# Patient Record
Sex: MALE | Race: WHITE | Employment: FULL TIME | URBAN - METROPOLITAN AREA
[De-identification: names, ages, dates, MRNs, and addresses within clinical notes are randomized per-mention and may not be internally consistent; named-entity substitution may affect disease eponyms.]

---

## 2017-01-19 PROBLEM — I10 ESSENTIAL HYPERTENSION WITH GOAL BLOOD PRESSURE LESS THAN 130/85: Status: ACTIVE | Noted: 2017-01-19

## 2017-02-10 ENCOUNTER — HOSPITAL ENCOUNTER (OUTPATIENT)
Dept: LAB | Age: 52
Discharge: HOME | End: 2017-02-10
Attending: RADIOLOGY
Payer: COMMERCIAL

## 2017-02-10 LAB
ALBUMIN SERPL BCP-MCNC: 3.7 G/DL (ref 3.5–5)
ALBUMIN/GLOB SERPL: 1.1 {RATIO}
ALP SERPL-CCNC: 43 U/L (ref 50–136)
ALT SERPL-CCNC: 34 U/L (ref 12–65)
ANION GAP BLD CALC-SCNC: 9 MMOL/L
APTT PPP: 23.8 SEC (ref 23.5–31.7)
AST SERPL W P-5'-P-CCNC: 13 U/L (ref 15–37)
BILIRUB SERPL-MCNC: 0.3 MG/DL (ref 0.2–1.1)
BUN SERPL-MCNC: 17 MG/DL (ref 6–23)
CALCIUM SERPL-MCNC: 8.5 MG/DL (ref 8.3–10.4)
CHLORIDE SERPL-SCNC: 106 MMOL/L (ref 98–107)
CO2 SERPL-SCNC: 28 MMOL/L (ref 23–32)
CREAT SERPL-MCNC: 0.8 MG/DL (ref 0.6–1)
ERYTHROCYTE [DISTWIDTH] IN BLOOD BY AUTOMATED COUNT: 12.6 % (ref 11.9–14.6)
GLOBULIN SER CALC-MCNC: 3.3 G/DL
GLUCOSE SERPL-MCNC: 103 MG/DL (ref 65–100)
HCT VFR BLD AUTO: 41.7 % (ref 41.1–50.3)
HGB BLD-MCNC: 14.2 G/DL (ref 13.6–17.2)
INR PPP: 0.9 (ref 0.9–1.2)
MCH RBC QN AUTO: 32.3 PG (ref 26.1–32.9)
MCHC RBC AUTO-ENTMCNC: 34.1 G/DL (ref 31.4–35)
MCV RBC AUTO: 95 FL (ref 79.6–97.8)
PLATELET # BLD AUTO: 212 K/UL (ref 150–450)
PMV BLD AUTO: 9.3 FL (ref 10.8–14.1)
POTASSIUM SERPL-SCNC: 3.7 MMOL/L (ref 3.5–5.1)
PROT SERPL-MCNC: 7 G/DL (ref 6.3–8.2)
PROTHROMBIN TIME: 9.6 SEC (ref 9.6–12)
RBC # BLD AUTO: 4.39 M/UL (ref 4.23–5.67)
SODIUM SERPL-SCNC: 143 MMOL/L (ref 136–145)
WBC # BLD AUTO: 5.2 K/UL (ref 4.3–11.1)

## 2017-02-10 PROCEDURE — 85027 COMPLETE CBC AUTOMATED: CPT | Performed by: RADIOLOGY

## 2017-02-10 PROCEDURE — 80053 COMPREHEN METABOLIC PANEL: CPT | Performed by: RADIOLOGY

## 2017-02-10 PROCEDURE — 85730 THROMBOPLASTIN TIME PARTIAL: CPT | Performed by: RADIOLOGY

## 2017-02-10 PROCEDURE — 36415 COLL VENOUS BLD VENIPUNCTURE: CPT | Performed by: RADIOLOGY

## 2017-02-10 PROCEDURE — 85610 PROTHROMBIN TIME: CPT | Performed by: RADIOLOGY

## 2017-02-17 ENCOUNTER — HOSPITAL ENCOUNTER (OUTPATIENT)
Dept: INTERVENTIONAL RADIOLOGY/VASCULAR | Age: 52
Discharge: HOME | End: 2017-02-17
Attending: RADIOLOGY
Payer: COMMERCIAL

## 2017-02-17 VITALS
SYSTOLIC BLOOD PRESSURE: 112 MMHG | HEART RATE: 77 BPM | HEIGHT: 70 IN | RESPIRATION RATE: 14 BRPM | DIASTOLIC BLOOD PRESSURE: 61 MMHG | WEIGHT: 190 LBS | BODY MASS INDEX: 27.2 KG/M2 | OXYGEN SATURATION: 97 %

## 2017-02-17 DIAGNOSIS — N28.1 RENAL CYST: ICD-10-CM

## 2017-02-17 PROCEDURE — 88305 TISSUE EXAM BY PATHOLOGIST: CPT | Performed by: RADIOLOGY

## 2017-02-17 PROCEDURE — 77030003666 HC NDL SPINAL BD -A

## 2017-02-17 PROCEDURE — 99153 MOD SED SAME PHYS/QHP EA: CPT

## 2017-02-17 PROCEDURE — 74011250636 HC RX REV CODE- 250/636: Performed by: RADIOLOGY

## 2017-02-17 PROCEDURE — 74011636320 HC RX REV CODE- 636/320: Performed by: RADIOLOGY

## 2017-02-17 PROCEDURE — 76942 ECHO GUIDE FOR BIOPSY: CPT

## 2017-02-17 PROCEDURE — 74011250636 HC RX REV CODE- 250/636

## 2017-02-17 PROCEDURE — 50390 DRAINAGE OF KIDNEY LESION: CPT

## 2017-02-17 PROCEDURE — C1729 CATH, DRAINAGE: HCPCS

## 2017-02-17 PROCEDURE — 99152 MOD SED SAME PHYS/QHP 5/>YRS: CPT

## 2017-02-17 PROCEDURE — C1769 GUIDE WIRE: HCPCS

## 2017-02-17 PROCEDURE — 74011000250 HC RX REV CODE- 250: Performed by: RADIOLOGY

## 2017-02-17 PROCEDURE — 88112 CYTOPATH CELL ENHANCE TECH: CPT | Performed by: RADIOLOGY

## 2017-02-17 RX ORDER — SODIUM CHLORIDE 9 MG/ML
500 INJECTION, SOLUTION INTRAVENOUS CONTINUOUS
Status: DISCONTINUED | OUTPATIENT
Start: 2017-02-17 | End: 2017-02-21 | Stop reason: HOSPADM

## 2017-02-17 RX ORDER — HYDROCODONE BITARTRATE AND ACETAMINOPHEN 7.5; 325 MG/1; MG/1
1 TABLET ORAL
Status: DISCONTINUED | OUTPATIENT
Start: 2017-02-17 | End: 2017-02-21 | Stop reason: HOSPADM

## 2017-02-17 RX ORDER — LIDOCAINE HYDROCHLORIDE 20 MG/ML
100-200 INJECTION, SOLUTION INFILTRATION; PERINEURAL ONCE
Status: COMPLETED | OUTPATIENT
Start: 2017-02-17 | End: 2017-02-17

## 2017-02-17 RX ORDER — DIPHENHYDRAMINE HYDROCHLORIDE 50 MG/ML
12.5-5 INJECTION, SOLUTION INTRAMUSCULAR; INTRAVENOUS ONCE
Status: COMPLETED | OUTPATIENT
Start: 2017-02-17 | End: 2017-02-17

## 2017-02-17 RX ORDER — MIDAZOLAM HYDROCHLORIDE 1 MG/ML
.25-2 INJECTION, SOLUTION INTRAMUSCULAR; INTRAVENOUS
Status: DISCONTINUED | OUTPATIENT
Start: 2017-02-17 | End: 2017-02-17 | Stop reason: ALTCHOICE

## 2017-02-17 RX ORDER — FENTANYL CITRATE 50 UG/ML
25-100 INJECTION, SOLUTION INTRAMUSCULAR; INTRAVENOUS
Status: DISCONTINUED | OUTPATIENT
Start: 2017-02-17 | End: 2017-02-17 | Stop reason: ALTCHOICE

## 2017-02-17 RX ORDER — HEPARIN SODIUM 200 [USP'U]/100ML
1000 INJECTION, SOLUTION INTRAVENOUS
Status: DISCONTINUED | OUTPATIENT
Start: 2017-02-17 | End: 2017-02-17 | Stop reason: ALTCHOICE

## 2017-02-17 RX ORDER — SODIUM CHLORIDE 9 MG/ML
25 INJECTION, SOLUTION INTRAVENOUS ONCE
Status: COMPLETED | OUTPATIENT
Start: 2017-02-17 | End: 2017-02-17

## 2017-02-17 RX ORDER — CIPROFLOXACIN 2 MG/ML
400 INJECTION, SOLUTION INTRAVENOUS ONCE
Status: COMPLETED | OUTPATIENT
Start: 2017-02-17 | End: 2017-02-17

## 2017-02-17 RX ORDER — ATROPINE SULFATE 0.1 MG/ML
0.5 INJECTION INTRAVENOUS ONCE
Status: COMPLETED | OUTPATIENT
Start: 2017-02-17 | End: 2017-02-17

## 2017-02-17 RX ADMIN — DIPHENHYDRAMINE HYDROCHLORIDE 50 MG: 50 INJECTION, SOLUTION INTRAMUSCULAR; INTRAVENOUS at 10:17

## 2017-02-17 RX ADMIN — LIDOCAINE HYDROCHLORIDE 100 MG: 20 INJECTION, SOLUTION INFILTRATION; PERINEURAL at 11:15

## 2017-02-17 RX ADMIN — SODIUM CHLORIDE 25 ML/HR: 900 INJECTION, SOLUTION INTRAVENOUS at 09:45

## 2017-02-17 RX ADMIN — CIPROFLOXACIN 400 MG: 2 INJECTION, SOLUTION INTRAVENOUS at 10:05

## 2017-02-17 RX ADMIN — MIDAZOLAM HYDROCHLORIDE 1 MG: 1 INJECTION, SOLUTION INTRAMUSCULAR; INTRAVENOUS at 10:22

## 2017-02-17 RX ADMIN — MIDAZOLAM HYDROCHLORIDE 1 MG: 1 INJECTION, SOLUTION INTRAMUSCULAR; INTRAVENOUS at 11:05

## 2017-02-17 RX ADMIN — SODIUM BICARBONATE 2 ML: 0.2 INJECTION, SOLUTION INTRAVENOUS at 11:15

## 2017-02-17 RX ADMIN — SODIUM CHLORIDE 250 ML: 900 INJECTION, SOLUTION INTRAVENOUS at 11:32

## 2017-02-17 RX ADMIN — IOPAMIDOL 8 ML: 612 INJECTION, SOLUTION INTRAVENOUS at 11:48

## 2017-02-17 RX ADMIN — ALCOHOL 8 ML: 0.98 INJECTION INTRASPINAL at 11:36

## 2017-02-17 RX ADMIN — MIDAZOLAM HYDROCHLORIDE 1 MG: 1 INJECTION, SOLUTION INTRAMUSCULAR; INTRAVENOUS at 10:02

## 2017-02-17 RX ADMIN — SODIUM CHLORIDE 500 ML: 900 INJECTION, SOLUTION INTRAVENOUS at 11:18

## 2017-02-17 RX ADMIN — ATROPINE SULFATE 0.5 MG: 0.1 INJECTION, SOLUTION INTRAVENOUS at 11:16

## 2017-02-17 RX ADMIN — FENTANYL CITRATE 50 MCG: 50 INJECTION, SOLUTION INTRAMUSCULAR; INTRAVENOUS at 10:02

## 2017-02-17 NOTE — PROGRESS NOTES
TRANSFER - OUT REPORT:    Verbal report given to JERALD RN (name) on Roro Koroma  being transferred to IR Recovery (unit) for routine progression of care       Report consisted of patients Situation, Background, Assessment and   Recommendations(SBAR). Information from the following report(s) Procedure Summary and MAR was reviewed with the receiving nurse. Opportunity for questions and clarification was provided. Conscious Sedation:   50 Mcg of Fentanyl administered  3 Mg of Versed administered  50 mg Benadryl    Pt tolerated procedure fair. Visit Vitals    /82    Pulse 65    Resp 9    Ht 5' 10\" (1.778 m)    Wt 86.2 kg (190 lb)    SpO2 100%    BMI 27.26 kg/m2     Past Medical History   Diagnosis Date    [facility]ism [facility]al[facility] ([facility] [address]     Cardiomyopathy ([facility] [address] 6/30/2016    Diverticular disease      colon resection with no symptoms since surgery    Essential hypertension      managed with med.  GERD (gastroesophageal reflux disease)      takes medication but not altogether relieved    Hypertension      managed with med.       Peripheral IV 02/17/17 Left Antecubital (Active)       Peripheral IV 02/17/17 Left Forearm (Active)

## 2017-02-17 NOTE — PROCEDURES
Interventional Radiology Brief Procedure Note    Patient: Fortunato Dakins MRN: [id number]  SSN: [id number]    YOB: 1965  Age: 46 y.o. Sex: male      Date of Procedure: 2/17/2017    Pre-Procedure Diagnosis:rt renal cyst    Post-Procedure Diagnosis: SAME    Procedure(s): US and fluoroscopic guided cyst aspiration, contrast study and sclerosis    Brief Description of Procedure: pt supine, aspirated 65ml fluid, sent for cytology. Contrast, then 8 ml abs ethanol    Performed By: Kelly Álvarez MD     Assistants: None    Anesthesia: Moderate Sedation    Estimated Blood Loss: Less than 10ml    Specimens: cyst fluid for cytology    Implants: ethanol 8 ml, re aspirated approx 4 ml. Findings: cyst, had to go through Rt lobe of liver    Complications: transient bradycardia felt secondary to vasovagal reaction.   Mild rash probably from fentanyl allergy, responded to Benadryl    Recommendations: follow symptomatically, will check cytology     Follow Up: family physician    Signed By: Kelly Álvarez MD     February 17, 2017

## 2017-02-17 NOTE — PROGRESS NOTES
Interventional Radiology Prep Area Handoff      Allergies   Allergen Reactions    Pcn [Penicillins] Hives       IRSummary reviewed. Pt identified with two identifiers. Verified procedure with Patient and IR Provider as Renal Cyst Ablation. Consent obtained: Awaiting MD H&P complete/updated: Awaiting MD MD airway complete: Awaiting MD    Sedation:Yes   NPO: Yes   Anticoagulation: None     Pt shaved: Yes   Antibiotics: Awaiting MD   Anesthesia notified: N/A    Additional Notes: None      Lines:  Peripherally Inserted Central Catheter:     Central Venous Catheter:     Venous Access Device:     Peripheral Intravenous Line:     Hemodialysis Catheter:     Drain(s):       Labs verified: Yes  No results found for this or any previous visit (from the past 24 hour(s)).   Patient Vitals for the past 8 hrs:   Pulse Resp BP SpO2   02/17/17 0833 61 17 113/70 99 %

## 2017-02-17 NOTE — PROGRESS NOTES
Patient placed prone on procedure table. Pressure points padded and safety straps applied.  in place.

## 2017-02-17 NOTE — PROGRESS NOTES
Discharge complete. Discharge instructions reviewed with pt. Time for questions allowed. Pt denies any questions at this time. Dressing to right back noted to be clean, dry, and intact. Pt assisted to front of hospital via wheelchair.      Visit Vitals    /61    Pulse 85    Resp 12    Ht 5' 10\" (1.778 m)    Wt 86.2 kg (190 lb)    SpO2 97%    BMI 27.26 kg/m2

## 2017-02-17 NOTE — IP AVS SNAPSHOT
[address] 
[phone] Patient: Nickolas Cárdenas MRN: [id number] :1965 You are allergic to the following Allergen Reactions Pcn (Penicillins) Hives Recent Documentation Height Weight BMI Smoking Status 1.778 m 86.2 kg 27.26 kg/m2 Former Smoker Emergency Contacts Name Discharge Info Relation Home Work Mobile Rae Alvarado DISCHARGE CAREGIVER [3] Mother [14] [phone] [address] [place] [address] CAREGIVER [3] Girlfriend [18] [phone] About your hospitalization You were admitted on:  2017 [facility] You were discharged on:  2017 Unit phone number:  [phone] Why you were hospitalized Your primary diagnosis was:  Not on File Providers Seen During Your Hospitalizations Provider Role Specialty Primary office phone Rocío Cunha MD Attending Provider Radiology [phone] Your Primary Care Physician (PCP) Primary Care Physician Office Phone Office Fax Cristal Finder [phone] Follow-up Information None Current Discharge Medication List  
  
ASK your doctor about these medications Dose & Instructions Dispensing Information Comments Morning Noon Evening Bedtime  
 cyanocobalamin 1,000 mcg tablet Your next dose is: Today, Tomorrow Other:  _________ Dose:  1000 mcg Take 1,000 mcg by mouth daily. Refills:  0  
     
   
   
   
  
 FISH OIL 1,000 mg Cap Generic drug:  omega-3 fatty acids-vitamin e Your next dose is: Today, Tomorrow Other:  _________ Dose:  1 Cap Take 1 Cap by mouth. Refills:  0 HYDROcodone-acetaminophen 7.5-325 mg per tablet Commonly known as:  Kaylie Bolden  
   
 Your next dose is: Today, Tomorrow Other:  _________ Dose:  1 Tab Take 1 Tab by mouth every six (6) hours as needed for Pain. Max Daily Amount: 4 Tabs. Quantity:  20 Tab Refills:  0  
     
   
   
   
  
 lisinopril 10 mg tablet Commonly known as:  Maria Luisa Joan Your next dose is: Today, Tomorrow Other:  _________ Dose:  10 mg Take 1 Tab by mouth daily. Quantity:  90 Tab Refills:  3  
     
   
   
   
  
 metoprolol succinate 25 mg XL tablet Commonly known as:  TOPROL-XL Your next dose is: Today, Tomorrow Other:  _________ Dose:  25 mg Take 1 Tab by mouth daily. Quantity:  90 Tab Refills:  3  
     
   
   
   
  
 multivitamin tablet Commonly known as:  ONE A DAY Your next dose is: Today, Tomorrow Other:  _________ Dose:  1 Tab Take 1 Tab by mouth every morning. Refills:  0 PROBIOTIC 4X 10-15 mg Tbec Generic drug:  B.infantis-B.ani-B.long-B.bifi Your next dose is: Today, Tomorrow Other:  _________ Take  by mouth. Refills:  0  
     
   
   
   
  
 sertraline 100 mg tablet Commonly known as:  ZOLOFT Your next dose is: Today, Tomorrow Other:  _________ Dose:  100 mg Take 1 Tab by mouth daily. Quantity:  90 Tab Refills:  1 VITAMIN C 1,000 mg tablet Generic drug:  ascorbic acid (vitamin C) Your next dose is: Today, Tomorrow Other:  _________ Dose:  1000 mg Take 1,000 mg by mouth every morning. Refills:  0 Discharge Instructions [place] [address] Department of Interventional Radiology [facility] 
[phone] Office 
[phone] Fax GENERAL SEDATION  DISCHARGE INSTRUCTIONS General Information: You received Sedation for your procedure today. Home Care Instructions Pain Control: For the majority of patients, one to two days of oral narcotic pain medication may be necessary after which Extra Strength Tylenol is usually sufficient to manage their pain. Showering: Patients can shower immediately upon discharge from the hospital allowing their incisions to get wet. Once out of the shower, pad your incision sites dry and avoid any heavy creams or lotions. Tub baths or hot tubs in the first 2 weeks are discouraged as this will allow for prolonged soaking of your incisions and increase the risk of infection. You may shower after returning home from the hospital. Your wound sites can get wet, but must be patted dry immediately after showering. Activity: Limit activity for 24 hours. After 24 hours resume normal activity slowly. Diet: Patients may resume a regular diet as tolerated You can resume your regular diet and medication regimen. You should relax for the rest of the day, and you will need someone to drive you home. For the next 24 hours, you should not drink alcohol, drive or take any sedative medications. Call If: 
You should call your Physician and/or the Radiology Nurse if you have any allergic reaction, which includes swelling, itching, rash, welts, and/or shortness of breath. Also, if you notice any sings of inceftion, redness, fever, puss at the procedure site. Follow-Up Instructions: Please see your ordering doctor as he/she has requested. To Reach Us: If you have any questions about your procedure, please call the Interventional Radiology department at [phone]. After business hours (5pm) and weekends, call the answering service at [phone] and ask for the Radiologist on call to be paged. Patient Signature: 
Date: 2/17/2017 Discharging Nurse: Calli Trejo RN Discharge Orders None Introducing [facility]! Dear Tierney Ramos: Thank you for requesting a [employer] account. Our records indicate that you already have an active Lexara account. You can access your account anytime at [email or web address] Did you know that you can access your hospital and ER discharge instructions at any time in Lexara? You can also review all of your test results from your hospital stay or ER visit. Additional Information If you have questions, please visit the Frequently Asked Questions section of the Lexara website at [email or web address] Remember, Lexara is NOT to be used for urgent needs. For medical emergencies, dial 911. Now available from your iPhone and Android! General Information Please provide this summary of care documentation to your next provider. Patient Signature:  ____________________________________________________________ Date:  ____________________________________________________________  
  
Earnstine Joao Provider Signature:  ____________________________________________________________ Date:  ____________________________________________________________

## 2017-02-17 NOTE — DISCHARGE INSTRUCTIONS
[address]  Department of Interventional Radiology  [facility]  [phone] Office  [phone] Fax    GENERAL SEDATION  DISCHARGE INSTRUCTIONS    General Information: You received Sedation for your procedure today. Home Care Instructions    Pain Control: For the majority of patients, one to two days of oral narcotic pain medication may be necessary after which Extra Strength Tylenol is usually sufficient to manage their pain. Showering: Patients can shower immediately upon discharge from the hospital allowing their incisions to get wet. Once out of the shower, pad your incision sites dry and avoid any heavy creams or lotions. Tub baths or hot tubs in the first 2 weeks are discouraged as this will allow for prolonged soaking of your incisions and increase the risk of infection. You may shower after returning home from the hospital. Your wound sites can get wet, but must be patted dry immediately after showering. Activity: Limit activity for 24 hours. After 24 hours resume normal activity slowly. Diet: Patients may resume a regular diet as tolerated You can resume your regular diet and medication regimen. You should relax for the rest of the day, and you will need someone to drive you home. For the next 24 hours, you should not drink alcohol, drive or take any sedative medications. Call If:  You should call your Physician and/or the Radiology Nurse if you have any allergic reaction, which includes swelling, itching, rash, welts, and/or shortness of breath. Also, if you notice any sings of inceftion, redness, fever, puss at the procedure site. Follow-Up Instructions: Please see your ordering doctor as he/she has requested. To Reach Us: If you have any questions about your procedure, please call the Interventional Radiology department at [phone].  After business hours (5pm) and weekends, call the answering service at [phone] and ask for the Radiologist on call to be paged.               Patient Signature:  Date: 2/17/2017  Discharging Nurse: Dandy Bernard RN

## 2017-02-17 NOTE — PROGRESS NOTES
TRANSFER - IN REPORT:    Verbal report received from LAUREN Joy(name) on Rip van Wafels  being received from procedure room(unit) for routine progression of care      Report consisted of patients Situation, Background, Assessment and   Recommendations(SBAR). Information from the following report(s) SBAR was reviewed with the receiving nurse. Opportunity for questions and clarification was provided. Assessment completed upon patients arrival to unit and care assumed.

## 2017-12-20 PROBLEM — F33.9 RECURRENT DEPRESSION (HCC): Status: ACTIVE | Noted: 2017-12-20

## 2018-12-19 PROBLEM — K21.9 GASTROESOPHAGEAL REFLUX DISEASE: Status: ACTIVE | Noted: 2018-12-19

## 2018-12-19 PROBLEM — I49.3 PVCS (PREMATURE VENTRICULAR CONTRACTIONS): Status: ACTIVE | Noted: 2018-12-19

## 2018-12-20 ENCOUNTER — HOSPITAL ENCOUNTER (OUTPATIENT)
Dept: LAB | Age: 53
Discharge: HOME | End: 2018-12-20
Payer: COMMERCIAL

## 2018-12-20 DIAGNOSIS — I49.3 PVCS (PREMATURE VENTRICULAR CONTRACTIONS): ICD-10-CM

## 2018-12-20 LAB
MAGNESIUM SERPL-MCNC: 2.2 MG/DL (ref 1.8–2.4)
POTASSIUM SERPL-SCNC: 3.7 MMOL/L (ref 3.5–5.1)

## 2018-12-20 PROCEDURE — 84132 ASSAY OF SERUM POTASSIUM: CPT

## 2018-12-20 PROCEDURE — 36415 COLL VENOUS BLD VENIPUNCTURE: CPT

## 2018-12-20 PROCEDURE — 83735 ASSAY OF MAGNESIUM: CPT

## 2020-02-14 PROCEDURE — 96361 HYDRATE IV INFUSION ADD-ON: CPT

## 2020-02-14 PROCEDURE — 99285 EMERGENCY DEPT VISIT HI MDM: CPT

## 2020-02-14 PROCEDURE — 30233N1 TRANSFUSION OF NONAUTOLOGOUS RED BLOOD CELLS INTO PERIPHERAL VEIN, PERCUTANEOUS APPROACH: ICD-10-PCS | Performed by: INTERNAL MEDICINE

## 2020-02-14 PROCEDURE — 96360 HYDRATION IV INFUSION INIT: CPT

## 2020-02-15 ENCOUNTER — HOSPITAL ENCOUNTER (INPATIENT)
Age: 55
LOS: 3 days | Discharge: HOME | DRG: 378 | End: 2020-02-18
Attending: EMERGENCY MEDICINE | Admitting: INTERNAL MEDICINE
Payer: COMMERCIAL

## 2020-02-15 ENCOUNTER — APPOINTMENT (OUTPATIENT)
Dept: CT IMAGING | Age: 55
DRG: 378 | End: 2020-02-15
Attending: EMERGENCY MEDICINE
Payer: COMMERCIAL

## 2020-02-15 DIAGNOSIS — K62.5 RECTAL BLEEDING: Primary | ICD-10-CM

## 2020-02-15 PROBLEM — K92.2 GI BLEED: Status: ACTIVE | Noted: 2020-02-15

## 2020-02-15 PROBLEM — K57.90 DIVERTICULOSIS: Chronic | Status: ACTIVE | Noted: 2020-02-15

## 2020-02-15 LAB
ALBUMIN SERPL-MCNC: 3.4 G/DL (ref 3.5–5)
ALBUMIN/GLOB SERPL: 1.2 {RATIO} (ref 1.2–3.5)
ALP SERPL-CCNC: 37 U/L (ref 50–136)
ALT SERPL-CCNC: 21 U/L (ref 12–65)
ANION GAP SERPL CALC-SCNC: 5 MMOL/L (ref 7–16)
APTT PPP: 25 SEC (ref 24.3–35.4)
AST SERPL-CCNC: 11 U/L (ref 15–37)
BASOPHILS # BLD: 0 K/UL (ref 0–0.2)
BASOPHILS NFR BLD: 0 % (ref 0–2)
BILIRUB SERPL-MCNC: 0.2 MG/DL (ref 0.2–1.1)
BUN SERPL-MCNC: 30 MG/DL (ref 6–23)
CALCIUM SERPL-MCNC: 8.3 MG/DL (ref 8.3–10.4)
CHLORIDE SERPL-SCNC: 110 MMOL/L (ref 98–107)
CO2 SERPL-SCNC: 27 MMOL/L (ref 21–32)
CREAT SERPL-MCNC: 0.84 MG/DL (ref 0.8–1.5)
DIFFERENTIAL METHOD BLD: ABNORMAL
EOSINOPHIL # BLD: 0.2 K/UL (ref 0–0.8)
EOSINOPHIL NFR BLD: 3 % (ref 0.5–7.8)
ERYTHROCYTE [DISTWIDTH] IN BLOOD BY AUTOMATED COUNT: 12.4 % (ref 11.9–14.6)
ERYTHROCYTE [DISTWIDTH] IN BLOOD BY AUTOMATED COUNT: 12.4 % (ref 11.9–14.6)
GLOBULIN SER CALC-MCNC: 2.9 G/DL (ref 2.3–3.5)
GLUCOSE SERPL-MCNC: 120 MG/DL (ref 65–100)
HCT VFR BLD AUTO: 33.4 % (ref 41.1–50.3)
HCT VFR BLD AUTO: 38.8 % (ref 41.1–50.3)
HGB BLD-MCNC: 10.8 G/DL (ref 13.6–17.2)
HGB BLD-MCNC: 12.6 G/DL (ref 13.6–17.2)
HGB BLD-MCNC: 8.8 G/DL (ref 13.6–17.2)
HGB BLD-MCNC: 9.8 G/DL (ref 13.6–17.2)
IMM GRANULOCYTES # BLD AUTO: 0 K/UL (ref 0–0.5)
IMM GRANULOCYTES NFR BLD AUTO: 0 % (ref 0–5)
INR PPP: 1
LYMPHOCYTES # BLD: 2.8 K/UL (ref 0.5–4.6)
LYMPHOCYTES NFR BLD: 35 % (ref 13–44)
MCH RBC QN AUTO: 32 PG (ref 26.1–32.9)
MCH RBC QN AUTO: 32 PG (ref 26.1–32.9)
MCHC RBC AUTO-ENTMCNC: 32.3 G/DL (ref 31.4–35)
MCHC RBC AUTO-ENTMCNC: 32.5 G/DL (ref 31.4–35)
MCV RBC AUTO: 98.5 FL (ref 79.6–97.8)
MCV RBC AUTO: 99.1 FL (ref 79.6–97.8)
MONOCYTES # BLD: 0.5 K/UL (ref 0.1–1.3)
MONOCYTES NFR BLD: 6 % (ref 4–12)
NEUTS SEG # BLD: 4.4 K/UL (ref 1.7–8.2)
NEUTS SEG NFR BLD: 55 % (ref 43–78)
NRBC # BLD: 0 K/UL (ref 0–0.2)
NRBC # BLD: 0 K/UL (ref 0–0.2)
PLATELET # BLD AUTO: 179 K/UL (ref 150–450)
PLATELET # BLD AUTO: 216 K/UL (ref 150–450)
PMV BLD AUTO: 9.2 FL (ref 9.4–12.3)
PMV BLD AUTO: 9.3 FL (ref 9.4–12.3)
POTASSIUM SERPL-SCNC: 4 MMOL/L (ref 3.5–5.1)
PROT SERPL-MCNC: 6.3 G/DL (ref 6.3–8.2)
PROTHROMBIN TIME: 13 SEC (ref 12–14.7)
RBC # BLD AUTO: 3.37 M/UL (ref 4.23–5.6)
RBC # BLD AUTO: 3.94 M/UL (ref 4.23–5.6)
SODIUM SERPL-SCNC: 142 MMOL/L (ref 136–145)
WBC # BLD AUTO: 10.1 K/UL (ref 4.3–11.1)
WBC # BLD AUTO: 7.9 K/UL (ref 4.3–11.1)

## 2020-02-15 PROCEDURE — 96360 HYDRATION IV INFUSION INIT: CPT

## 2020-02-15 PROCEDURE — 74011000258 HC RX REV CODE- 258: Performed by: EMERGENCY MEDICINE

## 2020-02-15 PROCEDURE — 86923 COMPATIBILITY TEST ELECTRIC: CPT

## 2020-02-15 PROCEDURE — 85027 COMPLETE CBC AUTOMATED: CPT

## 2020-02-15 PROCEDURE — 86900 BLOOD TYPING SEROLOGIC ABO: CPT

## 2020-02-15 PROCEDURE — 65270000029 HC RM PRIVATE

## 2020-02-15 PROCEDURE — 74177 CT ABD & PELVIS W/CONTRAST: CPT

## 2020-02-15 PROCEDURE — 85610 PROTHROMBIN TIME: CPT

## 2020-02-15 PROCEDURE — 36415 COLL VENOUS BLD VENIPUNCTURE: CPT

## 2020-02-15 PROCEDURE — 96361 HYDRATE IV INFUSION ADD-ON: CPT

## 2020-02-15 PROCEDURE — 85018 HEMOGLOBIN: CPT

## 2020-02-15 PROCEDURE — 74011250637 HC RX REV CODE- 250/637: Performed by: INTERNAL MEDICINE

## 2020-02-15 PROCEDURE — 85025 COMPLETE CBC W/AUTO DIFF WBC: CPT

## 2020-02-15 PROCEDURE — 74011636320 HC RX REV CODE- 636/320: Performed by: EMERGENCY MEDICINE

## 2020-02-15 PROCEDURE — 85730 THROMBOPLASTIN TIME PARTIAL: CPT

## 2020-02-15 PROCEDURE — 74011250636 HC RX REV CODE- 250/636: Performed by: INTERNAL MEDICINE

## 2020-02-15 PROCEDURE — 80053 COMPREHEN METABOLIC PANEL: CPT

## 2020-02-15 PROCEDURE — 74011250636 HC RX REV CODE- 250/636: Performed by: EMERGENCY MEDICINE

## 2020-02-15 RX ORDER — SODIUM CHLORIDE 0.9 % (FLUSH) 0.9 %
5-40 SYRINGE (ML) INJECTION EVERY 8 HOURS
Status: DISCONTINUED | OUTPATIENT
Start: 2020-02-15 | End: 2020-02-18 | Stop reason: HOSPADM

## 2020-02-15 RX ORDER — SODIUM CHLORIDE 0.9 % (FLUSH) 0.9 %
10 SYRINGE (ML) INJECTION
Status: COMPLETED | OUTPATIENT
Start: 2020-02-15 | End: 2020-02-15

## 2020-02-15 RX ORDER — SODIUM CHLORIDE 9 MG/ML
100 INJECTION, SOLUTION INTRAVENOUS CONTINUOUS
Status: DISCONTINUED | OUTPATIENT
Start: 2020-02-15 | End: 2020-02-16

## 2020-02-15 RX ORDER — SODIUM CHLORIDE 0.9 % (FLUSH) 0.9 %
5-40 SYRINGE (ML) INJECTION AS NEEDED
Status: DISCONTINUED | OUTPATIENT
Start: 2020-02-15 | End: 2020-02-18 | Stop reason: HOSPADM

## 2020-02-15 RX ORDER — DIPHENHYDRAMINE HCL 25 MG
25 CAPSULE ORAL
Status: DISCONTINUED | OUTPATIENT
Start: 2020-02-15 | End: 2020-02-18 | Stop reason: HOSPADM

## 2020-02-15 RX ORDER — PANTOPRAZOLE SODIUM 40 MG/1
40 TABLET, DELAYED RELEASE ORAL
Status: DISCONTINUED | OUTPATIENT
Start: 2020-02-15 | End: 2020-02-16

## 2020-02-15 RX ADMIN — Medication 10 ML: at 22:00

## 2020-02-15 RX ADMIN — Medication 10 ML: at 14:00

## 2020-02-15 RX ADMIN — Medication 10 ML: at 01:21

## 2020-02-15 RX ADMIN — IOPAMIDOL 100 ML: 755 INJECTION, SOLUTION INTRAVENOUS at 01:21

## 2020-02-15 RX ADMIN — SODIUM CHLORIDE 100 ML/HR: 900 INJECTION, SOLUTION INTRAVENOUS at 22:00

## 2020-02-15 RX ADMIN — SODIUM CHLORIDE 100 ML/HR: 900 INJECTION, SOLUTION INTRAVENOUS at 03:42

## 2020-02-15 RX ADMIN — SODIUM CHLORIDE 1000 ML: 900 INJECTION, SOLUTION INTRAVENOUS at 00:41

## 2020-02-15 RX ADMIN — SODIUM CHLORIDE 1000 ML: 900 INJECTION, SOLUTION INTRAVENOUS at 02:05

## 2020-02-15 RX ADMIN — PANTOPRAZOLE SODIUM 40 MG: 40 TABLET, DELAYED RELEASE ORAL at 20:44

## 2020-02-15 RX ADMIN — SODIUM CHLORIDE 100 ML/HR: 900 INJECTION, SOLUTION INTRAVENOUS at 12:46

## 2020-02-15 RX ADMIN — DIPHENHYDRAMINE HYDROCHLORIDE 25 MG: 25 CAPSULE ORAL at 22:08

## 2020-02-15 RX ADMIN — SODIUM CHLORIDE 100 ML: 900 INJECTION, SOLUTION INTRAVENOUS at 01:21

## 2020-02-15 NOTE — CONSULTS
[facility] Consult Note       Primary GI Physician: Dr Donovan Alves    Referring Provider:  Dr Elvis Johns Date:  2/15/2020    Admit Date:  2/15/2020    Chief Complaint:  GI bleed    Subjective:     History of Present Illness:  Patient is a 47 y.o. male with PMH of etoh abuse, anxiety, cardiomyopathy, diverticulosis s/p colon resection, GERD, s/p Nissen, HTN, who is seen in consultation at the request of Dr. Mony Hayward for GI bleeding. Pt presented to ED 2/15/20 for painless rectal bleeding. He starting passing large amounts of BRBPR this morning. He had associated lightheadedness. Rectal exam in ED with BRB on glove and guaiac positive. CT with no acute process, diverticulosis, HH. HGB 12.6 at arrival, down to 10.8 at most recent check. He was admitted to hospitalist.     He reports that 4 days ago he started having loose stool with a lot of BRB in it. He had 1 episode daily x 2 days. He then had normal BM for 2 days. This morning he started passing only BRB. He has had total of 5 episodes, with last being about 0230 this morning. No abdominal pain. Some nausea yesterday, but that has resolved. No change to BM prior to 4 days ago. No upper GI symptoms. No frequent NSAID use. Pt is known to Dr Richard Greenberg. Last EGD 1/9/2013 for GERD revealed HH. Last colonoscopy 3/9/18 for screening revealed diverticulosis. Recall recommended for 10 years. PMH:  Past Medical History:   Diagnosis Date    [facility] /al[facility] [address]     Anxiety     Cardiomyopathy ([facility] [address] 6/30/2016    Diverticular disease     colon resection with no symptoms since surgery    Essential hypertension     managed with med.  GERD (gastroesophageal reflux disease)     takes medication but not altogether relieved    Hiatal hernia with gastroesophageal reflux 2/5/2013    Hypertension     managed with med.         PSH:  Past Surgical History:   Procedure Laterality Date    HX COLECTOMY  4/2008    HX COLONOSCOPY  3/2018, 2008 10 years    HX CYST REMOVAL  2017    renal cyst    HX HERNIA REPAIR      Juan Manuel fundiplication       Allergies: Allergies   Allergen Reactions    Pcn [Penicillins] Hives       Home Medications:  Prior to Admission medications    Medication Sig Start Date End Date Taking? Authorizing Provider   ibuprofen (MOTRIN) 800 mg tablet Take 1 Tab by mouth every eight (8) hours as needed for Pain. 10/1/19   Lenore Ugarte NP   metoprolol succinate (TOPROL-XL) 25 mg XL tablet Take 1 Tab by mouth daily. 18   Ana Puri MD   sertraline (ZOLOFT) 100 mg tablet Take 0.5 Tabs by mouth daily. 10/9/18   Loki Dallas NP   B.infantis-B.ani-B.long-B.bifi (PROBIOTIC 4X) 10-15 mg TbEC Take  by mouth. Provider, Historical   cyanocobalamin 1,000 mcg tablet Take 1,000 mcg by mouth daily. Provider, Historical   ascorbic acid (VITAMIN C) 1,000 mg tablet Take 1,000 mg by mouth every morning. Provider, Historical   multivitamin (ONE A DAY) tablet Take 1 Tab by mouth every morning. Provider, Historical       Hospital Medications:  Current Facility-Administered Medications   Medication Dose Route Frequency    0.9% sodium chloride infusion  100 mL/hr IntraVENous CONTINUOUS    sodium chloride (NS) flush 5-40 mL  5-40 mL IntraVENous Q8H    sodium chloride (NS) flush 5-40 mL  5-40 mL IntraVENous PRN    influenza vaccine - (6 mos+)(PF) (FLUARIX/FLULAVAL/FLUZONE QUAD) injection 0.5 mL  0.5 mL IntraMUSCular PRIOR TO DISCHARGE       Social History:  Social History     Tobacco Use    Smoking status: Former Smoker     Years: 20.00     Last attempt to quit: 2015     Years since quittin.2    Smokeless tobacco: Never Used    Tobacco comment:  2 to 3 cigarettes per day. Substance Use Topics    Alcohol use: No     Alcohol/week: 0.0 standard drinks     Comment: last drink 3/2015 sober       Pt denies any history of drug use, blood transfusions, or tattoos.     Family History:  Family History Problem Relation Age of Onset    Cancer Father         brain    Breast Cancer Mother     No Known Problems Brother        Review of Systems:  A detailed 10 system ROS is obtained, with pertinent positives as listed above. All others are negative. Diet: NPO    Objective:     Physical Exam:  Vitals:  Visit Vitals  BP 93/62   Pulse 79   Temp 97.4 °F (36.3 °C)   Resp 17   Ht 5' 8\" (1.727 m)   Wt 88 kg (194 lb)   SpO2 96%   BMI 29.50 kg/m²     Gen:  Pt is alert, cooperative, no acute distress  Skin:  Extremities and face reveal no rashes. HEENT: Sclerae anicteric. Extra-occular muscles are intact. No oral ulcers. No abnormal pigmentation of the lips. The neck is supple. Cardiovascular: Regular rate and rhythm. No murmurs, gallops, or rubs. Respiratory:  Comfortable breathing with no accessory muscle use. Clear breath sounds anteriorly with no wheezes, rales, or rhonchi. GI:  Abdomen nondistended, soft, and nontender. Normal active bowel sounds. No enlargement of the liver or spleen. No masses palpable. Rectal:  Deferred  Musculoskeletal:  No pitting edema of the lower legs. Neurological:  Gross memory appears intact. Patient is alert and oriented. Psychiatric:  Mood appears appropriate with judgement intact. Lymphatic:  No cervical or supraclavicular adenopathy. Laboratory:    Recent Labs     02/15/20  0207 02/15/20  0014   WBC 10.1 7.9   HGB 10.8* 12.6*   HCT 33.4* 38.8*    216   MCV 99.1* 98.5*   NA  --  142   K  --  4.0   CL  --  110*   CO2  --  27   BUN  --  30*   CREA  --  0.84   CA  --  8.3   GLU  --  120*   AP  --  37*   SGOT  --  11*   ALT  --  21   TBILI  --  0.2   ALB  --  3.4*   TP  --  6.3   PTP  --  13.0   INR  --  1.0   APTT  --  25.0      EXAM: CT abdomen and pelvis with IV contrast. INDICATION: Rectal bleeding. COMPARISON: Prior CT abdomen and pelvis on July 24, 2016.  TECHNIQUE: Axial CT images of the abdomen and pelvis were obtained after the intravenous injection of 100 mL Isovue 370 CT contrast. Radiation dose reduction techniques were used for this study. Our CT scanners use one or all of the following:  Automated exposure control, adjustment of the mA or kV according to patient size, iterative reconstruction. FINDINGS: - Liver: Within normal limits. - Gallbladder and bile ducts: Within normal limits. - Spleen: Within normal limits. - Urinary tract: The previous 4.5 cm right kidney cyst has collapsed or been removed. There is a smaller approximate 1 cm cyst in the lower pole of the right kidney. The left kidney and the urinary bladder are unremarkable. No hydronephrosis is identified. - Adrenals: Left adrenal gland thickening is unchanged. The right adrenal gland remains within normal limits. - Pancreas: Within normal limits. - Gastrointestinal tract: There is mild colonic diverticulosis, as well as a sigmoid colon suture anastomosis. There is no evidence of acute diverticulitis or bowel obstruction. The small bowel, rectum and appendix are normal. There is a small to moderate sized hiatal hernia, with adjacent surgical clips. - Retroperitoneum: Within normal limits. - Peritoneal cavity and abdominal wall: Within normal limits. - Pelvis: Within normal limits. - Spine/bones: No acute process. - Other comments: None.      IMPRESSION: 1. No acute process. 2. Colonic diverticulosis and a sigmoid colon suture anastomosis. There is also a small to moderate sized hiatal hernia, with adjacent surgical clips. 3. Interval collapse or removal of the previous 4.5 cm right kidney cyst.     Assessment:     Principal Problem:    GI bleed (2/15/2020)    Active Problems:    Diverticulosis (2/15/2020)    Patient is a 47 y.o. male with PMH of etoh abuse, anxiety, cardiomyopathy, diverticulosis s/p colon resection, GERD, s/p Nissen, HTN, who is seen in consultation at the request of Dr. Mony Hayward for GI bleeding. He had 2 episode of bloody diarrhea earlier this week.  He started passing BRBPR this morning and had lightheadedness. He has had 5 total episodes. HGB 10.8. He does have hx of diverticulosis. No abdominal pain. Plan:     -Likely diverticular bleed  -Monitor HGB and transfuse as needed  -Monitor for signs of GIB bleeding  -Supportive care  -Consider RBC tag scan if additional episodes of bleeding vs colonoscopy  -Continue to follow    EMERSON Ghosh    Patient is seen and examined in collaboration with Dr. Frank Montano. Assessment and plan as per Dr. Frank Montano.

## 2020-02-15 NOTE — PROGRESS NOTES
02/15/20 0450   Dual Skin Pressure Injury Assessment   Dual Skin Pressure Injury Assessment WDL   Second Care Provider (Based on [address] [place] [address]) Marcello Umanzor RN

## 2020-02-15 NOTE — PROGRESS NOTES
TRANSFER - IN REPORT:    Verbal report received from Nicolette Edwards RN on Jin Juan  being received from ED for routine progression of care      Report consisted of patients Situation, Background, Assessment and   Recommendations(SBAR). Information from the following report(s) SBAR was reviewed with the receiving nurse. Opportunity for questions and clarification was provided. Assessment completed upon patients arrival to unit and care assumed.

## 2020-02-15 NOTE — ED NOTES
TRANSFER - OUT REPORT:    Verbal report given to [employer] on Navvalente Red Guru Jorge  being transferred to [facility] for routine progression of care       Report consisted of patients Situation, Background, Assessment and   Recommendations(SBAR). Information from the following report(s) SBAR, ED Summary and MAR was reviewed with the receiving nurse. Lines:   Peripheral IV 02/15/20 Left Antecubital (Active)   Site Assessment Clean, dry, & intact 2/15/2020 12:23 AM   Phlebitis Assessment 0 2/15/2020 12:23 AM   Infiltration Assessment 0 2/15/2020 12:23 AM   Dressing Status Clean, dry, & intact 2/15/2020 12:23 AM        Opportunity for questions and clarification was provided.       Patient transported with:   [employer]

## 2020-02-15 NOTE — H&P
Hospitalist Note     Admit Date:  2/15/2020 12:09 AM   Name:  Debra Perry   Age:  47 y.o.  :  1965   MRN:  [id number]   PCP:  Nancy Perez MD  Treatment Team: Attending Provider: Kirti Cobian MD; Primary Nurse: Sasha Mccollum RN    HPI/Subjective:   47 y.o male w/ PMH of Diverticulosis s/p partial colectomy and GERD s/p Nissen fundoplication presents from home with rectal bleeding. He had 2 episodes of rectal bleeding 4 days ago, followed by dark stool. Today, he had 4 large BMs with bright red blood per rectum and 1 bloody BM in the emergency department. He has no prior history of GI bleeding, last colonoscopy was 1 year ago which showed diverticulosis. He has no other symptoms at this time, denies any nausea vomiting abdominal pain or chest pain. He reported dizziness prior to arrival which is improved after IV fluids. 10 systems reviewed and negative except as noted in HPI. Past Medical History:   Diagnosis Date    Alcoholism /alcohol abuse ([facility] [address]     Anxiety     Cardiomyopathy ([facility] [address] 2016    Diverticular disease     colon resection with no symptoms since surgery    Essential hypertension     managed with med.  GERD (gastroesophageal reflux disease)     takes medication but not altogether relieved    Hiatal hernia with gastroesophageal reflux 2013    Hypertension     managed with med. Past Surgical History:   Procedure Laterality Date    HX COLECTOMY  2008    HX COLONOSCOPY  3/2018,     10 years    HX CYST REMOVAL  2017    renal cyst    HX HERNIA REPAIR      Juan Manuel fundiplication      Allergies   Allergen Reactions    Pcn [Penicillins] Hives      Social History     Tobacco Use    Smoking status: Former Smoker     Years: 20.00     Last attempt to quit: 2015     Years since quittin.2    Smokeless tobacco: Never Used    Tobacco comment:  2 to 3 cigarettes per day.    Substance Use Topics    Alcohol use: No     Alcohol/week: 0.0 standard drinks     Comment: last drink 3/2015 sober      Family History   Problem Relation Age of Onset    Cancer Father         brain    Breast Cancer Mother     No Known Problems Brother       Family history reviewed and noncontributory. Immunization History   Administered Date(s) Administered    Tdap 11/10/2016     PTA Medications:  Prior to Admission Medications   Prescriptions Last Dose Informant Patient Reported? Taking? B.infantis-B.ani-B.long-B.bifi (PROBIOTIC 4X) 10-15 mg TbEC   Yes No   Sig: Take  by mouth. ascorbic acid (VITAMIN C) 1,000 mg tablet   Yes No   Sig: Take 1,000 mg by mouth every morning. cyanocobalamin 1,000 mcg tablet   Yes No   Sig: Take 1,000 mcg by mouth daily. ibuprofen (MOTRIN) 800 mg tablet   No No   Sig: Take 1 Tab by mouth every eight (8) hours as needed for Pain.   metoprolol succinate (TOPROL-XL) 25 mg XL tablet   No No   Sig: Take 1 Tab by mouth daily. multivitamin (ONE A DAY) tablet   Yes No   Sig: Take 1 Tab by mouth every morning. sertraline (ZOLOFT) 100 mg tablet   No No   Sig: Take 0.5 Tabs by mouth daily. Facility-Administered Medications: None       Objective:     Patient Vitals for the past 24 hrs:   Temp Pulse Resp BP SpO2   02/15/20 0159  71 17 107/60 95 %   02/15/20 0156  (!) 101 17 94/52 98 %   02/15/20 0152  80 17 97/55 96 %   02/15/20 0045  71  100/63 98 %   02/15/20 0042    96/61    02/15/20 0031  82 17 99/63 97 %   02/15/20 0011 97.8 °F (36.6 °C) 88 16 109/75 96 %     Oxygen Therapy  O2 Sat (%): 95 % (02/15/20 0159)  Pulse via Oximetry: 71 beats per minute (02/15/20 0045)  O2 Device: Room air (02/15/20 0011)    Estimated body mass index is 29.5 kg/m² as calculated from the following:    Height as of this encounter: 5' 8\" (1.727 m). Weight as of this encounter: 88 kg (194 lb).       Intake/Output Summary (Last 24 hours) at 2/15/2020 0253  Last data filed at 2/15/2020 0152  Gross per 24 hour   Intake 1100 ml   Output    Net 1100 ml       *Note that automatically entered I/Os may not be accurate; dependent on patient compliance with collection and accurate  by assistants. Physical Exam:  General:    Well nourished. Alert. Eyes:   Normal sclerae. Extraocular movements intact. HENT:  Normocephalic, atraumatic. Moist mucous membranes  CV:   RRR. No m/r/g. Lungs:  CTAB. No wheezing, rhonchi, or rales. Abdomen: Soft, mild left lower quadrant tenderness, nondistended. Positive bowel sounds  Extremities: Warm and dry. No cyanosis or edema. Neurologic: CN II-XII grossly intact. Sensation intact. Skin:     No rashes or jaundice. Normal coloration  Psych:  Normal mood and affect. I reviewed the labs, imaging, EKGs, telemetry, and other studies done this admission. Data Review:   Recent Results (from the past 24 hour(s))   CBC WITH AUTOMATED DIFF    Collection Time: 02/15/20 12:14 AM   Result Value Ref Range    WBC 7.9 4.3 - 11.1 K/uL    RBC 3.94 (L) 4.23 - 5.6 M/uL    HGB 12.6 (L) 13.6 - 17.2 g/dL    HCT 38.8 (L) 41.1 - 50.3 %    MCV 98.5 (H) 79.6 - 97.8 FL    MCH 32.0 26.1 - 32.9 PG    MCHC 32.5 31.4 - 35.0 g/dL    RDW 12.4 11.9 - 14.6 %    PLATELET 893 068 - 850 K/uL    MPV 9.2 (L) 9.4 - 12.3 FL    ABSOLUTE NRBC 0.00 0.0 - 0.2 K/uL    DF AUTOMATED      NEUTROPHILS 55 43 - 78 %    LYMPHOCYTES 35 13 - 44 %    MONOCYTES 6 4.0 - 12.0 %    EOSINOPHILS 3 0.5 - 7.8 %    BASOPHILS 0 0.0 - 2.0 %    IMMATURE GRANULOCYTES 0 0.0 - 5.0 %    ABS. NEUTROPHILS 4.4 1.7 - 8.2 K/UL    ABS. LYMPHOCYTES 2.8 0.5 - 4.6 K/UL    ABS. MONOCYTES 0.5 0.1 - 1.3 K/UL    ABS. EOSINOPHILS 0.2 0.0 - 0.8 K/UL    ABS. BASOPHILS 0.0 0.0 - 0.2 K/UL    ABS. IMM.  GRANS. 0.0 0.0 - 0.5 K/UL   METABOLIC PANEL, COMPREHENSIVE    Collection Time: 02/15/20 12:14 AM   Result Value Ref Range    Sodium 142 136 - 145 mmol/L    Potassium 4.0 3.5 - 5.1 mmol/L    Chloride 110 (H) 98 - 107 mmol/L    CO2 27 21 - 32 mmol/L    Anion gap 5 (L) 7 - 16 mmol/L Glucose 120 (H) 65 - 100 mg/dL    BUN 30 (H) 6 - 23 MG/DL    Creatinine 0.84 0.8 - 1.5 MG/DL    GFR est AA >60 >60 ml/min/1.73m2    GFR est non-AA >60 >60 ml/min/1.73m2    Calcium 8.3 8.3 - 10.4 MG/DL    Bilirubin, total 0.2 0.2 - 1.1 MG/DL    ALT (SGPT) 21 12 - 65 U/L    AST (SGOT) 11 (L) 15 - 37 U/L    Alk. phosphatase 37 (L) 50 - 136 U/L    Protein, total 6.3 6.3 - 8.2 g/dL    Albumin 3.4 (L) 3.5 - 5.0 g/dL    Globulin 2.9 2.3 - 3.5 g/dL    A-G Ratio 1.2 1.2 - 3.5     PROTHROMBIN TIME + INR    Collection Time: 02/15/20 12:14 AM   Result Value Ref Range    Prothrombin time 13.0 12.0 - 14.7 sec    INR 1.0     PTT    Collection Time: 02/15/20 12:14 AM   Result Value Ref Range    aPTT 25.0 24.3 - 35.4 SEC   TYPE & SCREEN    Collection Time: 02/15/20 12:15 AM   Result Value Ref Range    Crossmatch Expiration 02/18/2020     ABO/Rh(D) A POSITIVE     Antibody screen NEG    CBC W/O DIFF    Collection Time: 02/15/20  2:07 AM   Result Value Ref Range    WBC 10.1 4.3 - 11.1 K/uL    RBC 3.37 (L) 4.23 - 5.6 M/uL    HGB 10.8 (L) 13.6 - 17.2 g/dL    HCT 33.4 (L) 41.1 - 50.3 %    MCV 99.1 (H) 79.6 - 97.8 FL    MCH 32.0 26.1 - 32.9 PG    MCHC 32.3 31.4 - 35.0 g/dL    RDW 12.4 11.9 - 14.6 %    PLATELET 591 833 - 393 K/uL    MPV 9.3 (L) 9.4 - 12.3 FL    ABSOLUTE NRBC 0.00 0.0 - 0.2 K/uL       All Micro Results     None          No current facility-administered medications for this encounter. Current Outpatient Medications   Medication Sig    ibuprofen (MOTRIN) 800 mg tablet Take 1 Tab by mouth every eight (8) hours as needed for Pain.  metoprolol succinate (TOPROL-XL) 25 mg XL tablet Take 1 Tab by mouth daily.  sertraline (ZOLOFT) 100 mg tablet Take 0.5 Tabs by mouth daily.  B.infantis-B.ani-B.long-B.bifi (PROBIOTIC 4X) 10-15 mg TbEC Take  by mouth.  cyanocobalamin 1,000 mcg tablet Take 1,000 mcg by mouth daily.  ascorbic acid (VITAMIN C) 1,000 mg tablet Take 1,000 mg by mouth every morning.       multivitamin (ONE A DAY) tablet Take 1 Tab by mouth every morning. Other Studies:  No results found for this visit on 02/15/20. Ct Abd Pelv W Cont    Result Date: 2/15/2020  EXAM: CT abdomen and pelvis with IV contrast. INDICATION: Rectal bleeding. COMPARISON: Prior CT abdomen and pelvis on July 24, 2016. TECHNIQUE: Axial CT images of the abdomen and pelvis were obtained after the intravenous injection of 100 mL Isovue 370 CT contrast. Radiation dose reduction techniques were used for this study. Our CT scanners use one or all of the following:  Automated exposure control, adjustment of the mA or kV according to patient size, iterative reconstruction. FINDINGS: - Liver: Within normal limits. - Gallbladder and bile ducts: Within normal limits. - Spleen: Within normal limits. - Urinary tract: The previous 4.5 cm right kidney cyst has collapsed or been removed. There is a smaller approximate 1 cm cyst in the lower pole of the right kidney. The left kidney and the urinary bladder are unremarkable. No hydronephrosis is identified. - Adrenals: Left adrenal gland thickening is unchanged. The right adrenal gland remains within normal limits. - Pancreas: Within normal limits. - Gastrointestinal tract: There is mild colonic diverticulosis, as well as a sigmoid colon suture anastomosis. There is no evidence of acute diverticulitis or bowel obstruction. The small bowel, rectum and appendix are normal. There is a small to moderate sized hiatal hernia, with adjacent surgical clips. - Retroperitoneum: Within normal limits. - Peritoneal cavity and abdominal wall: Within normal limits. - Pelvis: Within normal limits. - Spine/bones: No acute process. - Other comments: None. IMPRESSION: 1. No acute process. 2. Colonic diverticulosis and a sigmoid colon suture anastomosis. There is also a small to moderate sized hiatal hernia, with adjacent surgical clips.  3. Interval collapse or removal of the previous 4.5 cm right kidney cyst. Assessment and Plan:     Hospital Problems as of 2/15/2020 Date Reviewed: 2/15/2020          Codes Class Noted - Resolved POA    * (Principal) GI bleed ICD-10-CM: K92.2  ICD-9-CM: 578.9  2/15/2020 - Present Unknown        Diverticulosis (Chronic) ICD-10-CM: K57.90  ICD-9-CM: 562.10  2/15/2020 - Present Unknown              Plan:  1) GI bleed   Likely lower GI, diverticular given history and presentation, GI consulted for colonoscopy, continue IV hydration, trend H&H, transfuse for hemoglobin less than 7.    2) GERD   History of Nissen fundoplication, currently asymptomatic.     Discharge planning: Home when stable  DVT ppx not ordered, contraindicated due to bleed  Code status:  Full  Estimated LOS:  Greater than 2 midnights  Risk:  high    Signed:  Harmony Reid MD

## 2020-02-15 NOTE — PROGRESS NOTES
47 y.o  male w/ PMH of Diverticulosis s/p partial colectomy and GERD s/p Nissen fundoplication admitted after midnight for rectal bleed. Stated 4 day onset of dark stools then stated 4 large bright red stools 2/14. Last colonoscopy 1 year ago showing diverticulosis. No complaints of N/V/D. Hemoglobin drop drom 12.6 to 10.8. Gi consulted and will provide supportive care at this time. I have seen the patient and agree with the plan. He is now on clear liquid diet, will check hemoglobin Q8H x3. Of note, patient with history of ETOH use but last used 5 years ago.

## 2020-02-15 NOTE — ED TRIAGE NOTES
Reports abdominal pain, bright red rectal bleeding x4 episodes tonight, onset approx 1 hour pta. Reports lightheadedness and nausea. Denies blood thinners. Family reports similar episode couple days ago however resolved. Hx colon resection and diverticulitis.

## 2020-02-16 ENCOUNTER — APPOINTMENT (OUTPATIENT)
Dept: NUCLEAR MEDICINE | Age: 55
DRG: 378 | End: 2020-02-16
Attending: NURSE PRACTITIONER
Payer: COMMERCIAL

## 2020-02-16 ENCOUNTER — APPOINTMENT (OUTPATIENT)
Dept: CT IMAGING | Age: 55
DRG: 378 | End: 2020-02-16
Attending: NURSE PRACTITIONER
Payer: COMMERCIAL

## 2020-02-16 LAB
ANION GAP SERPL CALC-SCNC: 3 MMOL/L (ref 7–16)
BASOPHILS # BLD: 0 K/UL (ref 0–0.2)
BASOPHILS NFR BLD: 0 % (ref 0–2)
BUN SERPL-MCNC: 10 MG/DL (ref 6–23)
CALCIUM SERPL-MCNC: 7.7 MG/DL (ref 8.3–10.4)
CHLORIDE SERPL-SCNC: 115 MMOL/L (ref 98–107)
CO2 SERPL-SCNC: 28 MMOL/L (ref 21–32)
CREAT SERPL-MCNC: 0.78 MG/DL (ref 0.8–1.5)
DIFFERENTIAL METHOD BLD: ABNORMAL
EOSINOPHIL # BLD: 0.1 K/UL (ref 0–0.8)
EOSINOPHIL NFR BLD: 4 % (ref 0.5–7.8)
ERYTHROCYTE [DISTWIDTH] IN BLOOD BY AUTOMATED COUNT: 12.6 % (ref 11.9–14.6)
GLUCOSE BLD STRIP.AUTO-MCNC: 107 MG/DL (ref 65–100)
GLUCOSE SERPL-MCNC: 107 MG/DL (ref 65–100)
HCT VFR BLD AUTO: 29.7 % (ref 41.1–50.3)
HGB BLD-MCNC: 10.1 G/DL (ref 13.6–17.2)
HGB BLD-MCNC: 8 G/DL (ref 13.6–17.2)
HGB BLD-MCNC: 8.5 G/DL (ref 13.6–17.2)
HGB BLD-MCNC: 9.4 G/DL (ref 13.6–17.2)
IMM GRANULOCYTES # BLD AUTO: 0 K/UL (ref 0–0.5)
IMM GRANULOCYTES NFR BLD AUTO: 0 % (ref 0–5)
LYMPHOCYTES # BLD: 1.6 K/UL (ref 0.5–4.6)
LYMPHOCYTES NFR BLD: 48 % (ref 13–44)
MCH RBC QN AUTO: 31.8 PG (ref 26.1–32.9)
MCHC RBC AUTO-ENTMCNC: 31.6 G/DL (ref 31.4–35)
MCV RBC AUTO: 100.3 FL (ref 79.6–97.8)
MONOCYTES # BLD: 0.2 K/UL (ref 0.1–1.3)
MONOCYTES NFR BLD: 7 % (ref 4–12)
NEUTS SEG # BLD: 1.3 K/UL (ref 1.7–8.2)
NEUTS SEG NFR BLD: 40 % (ref 43–78)
NRBC # BLD: 0 K/UL (ref 0–0.2)
PLATELET # BLD AUTO: 148 K/UL (ref 150–450)
PMV BLD AUTO: 9.1 FL (ref 9.4–12.3)
POTASSIUM SERPL-SCNC: 3.9 MMOL/L (ref 3.5–5.1)
RBC # BLD AUTO: 2.96 M/UL (ref 4.23–5.6)
SODIUM SERPL-SCNC: 146 MMOL/L (ref 136–145)
WBC # BLD AUTO: 3.3 K/UL (ref 4.3–11.1)

## 2020-02-16 PROCEDURE — A9512 TC99M PERTECHNETATE: HCPCS

## 2020-02-16 PROCEDURE — 70450 CT HEAD/BRAIN W/O DYE: CPT

## 2020-02-16 PROCEDURE — C9113 INJ PANTOPRAZOLE SODIUM, VIA: HCPCS | Performed by: NURSE PRACTITIONER

## 2020-02-16 PROCEDURE — 74011250637 HC RX REV CODE- 250/637: Performed by: NURSE PRACTITIONER

## 2020-02-16 PROCEDURE — 36415 COLL VENOUS BLD VENIPUNCTURE: CPT

## 2020-02-16 PROCEDURE — 74011250637 HC RX REV CODE- 250/637: Performed by: INTERNAL MEDICINE

## 2020-02-16 PROCEDURE — 85018 HEMOGLOBIN: CPT

## 2020-02-16 PROCEDURE — 80048 BASIC METABOLIC PNL TOTAL CA: CPT

## 2020-02-16 PROCEDURE — 65270000029 HC RM PRIVATE

## 2020-02-16 PROCEDURE — 74011000250 HC RX REV CODE- 250: Performed by: NURSE PRACTITIONER

## 2020-02-16 PROCEDURE — 82962 GLUCOSE BLOOD TEST: CPT

## 2020-02-16 PROCEDURE — 74011250636 HC RX REV CODE- 250/636: Performed by: NURSE PRACTITIONER

## 2020-02-16 PROCEDURE — 77030040361 HC SLV COMPR DVT MDII -B

## 2020-02-16 PROCEDURE — 85025 COMPLETE CBC W/AUTO DIFF WBC: CPT

## 2020-02-16 RX ORDER — SODIUM CHLORIDE 0.9 % (FLUSH) 0.9 %
10 SYRINGE (ML) INJECTION
Status: ACTIVE | OUTPATIENT
Start: 2020-02-16 | End: 2020-02-17

## 2020-02-16 RX ORDER — LORAZEPAM 0.5 MG/1
0.5 TABLET ORAL
Status: DISCONTINUED | OUTPATIENT
Start: 2020-02-16 | End: 2020-02-18 | Stop reason: HOSPADM

## 2020-02-16 RX ORDER — SODIUM CHLORIDE, SODIUM LACTATE, POTASSIUM CHLORIDE, CALCIUM CHLORIDE 600; 310; 30; 20 MG/100ML; MG/100ML; MG/100ML; MG/100ML
150 INJECTION, SOLUTION INTRAVENOUS CONTINUOUS
Status: DISCONTINUED | OUTPATIENT
Start: 2020-02-16 | End: 2020-02-18 | Stop reason: HOSPADM

## 2020-02-16 RX ADMIN — PANTOPRAZOLE SODIUM 40 MG: 40 TABLET, DELAYED RELEASE ORAL at 05:40

## 2020-02-16 RX ADMIN — Medication 10 ML: at 05:40

## 2020-02-16 RX ADMIN — LORAZEPAM 0.5 MG: 0.5 TABLET ORAL at 23:07

## 2020-02-16 RX ADMIN — SODIUM CHLORIDE, SODIUM LACTATE, POTASSIUM CHLORIDE, AND CALCIUM CHLORIDE 150 ML/HR: 600; 310; 30; 20 INJECTION, SOLUTION INTRAVENOUS at 11:43

## 2020-02-16 RX ADMIN — Medication 10 ML: at 13:16

## 2020-02-16 RX ADMIN — PANTOPRAZOLE SODIUM 40 MG: 40 INJECTION, POWDER, FOR SOLUTION INTRAVENOUS at 22:44

## 2020-02-16 RX ADMIN — PANTOPRAZOLE SODIUM 40 MG: 40 INJECTION, POWDER, FOR SOLUTION INTRAVENOUS at 13:16

## 2020-02-16 RX ADMIN — Medication 10 ML: at 22:44

## 2020-02-16 RX ADMIN — SODIUM CHLORIDE 1000 ML: 900 INJECTION, SOLUTION INTRAVENOUS at 11:44

## 2020-02-16 NOTE — PROGRESS NOTES
END OF SHIFT NOTE:    INTAKE/OUTPUT  02/15 0701 - 02/16 0700  In: 2586 [I.V.:2586]  Out: 5747 [Urine:1650]  Voiding: YES  Catheter: NO  Drain:              Flatus: Patient does have flatus present. Stool:  0 occurrences. Characteristics:  Stool Assessment  Stool Color: Red  Stool Appearance: Bloody  Stool Amount: Medium    Emesis: 0 occurrences. Characteristics:        VITAL SIGNS  Patient Vitals for the past 12 hrs:   Temp Pulse Resp BP SpO2   02/16/20 0317 97.3 °F (36.3 °C) 71 18 101/64    02/16/20 0018 98 °F (36.7 °C) 95  101/65 95 %   02/15/20 1903 97.4 °F (36.3 °C) 84 17 98/67 97 %       Pain Assessment  Pain Intensity 1: 2 (02/15/20 1915)  Pain Location 1: Abdomen  Pain Intervention(s) 1: Family Support, Emotional support  Patient Stated Pain Goal: 0    Ambulating  Yes    Shift report given to oncoming nurse at the bedside.     Jada Whitman RN

## 2020-02-16 NOTE — PROGRESS NOTES
Hospitalist Progress Note     Admit Date:  2/15/2020 12:09 AM   Name:  Nickolas Cárdenas   Age:  47 y.o.  :  1965   MRN:  [id number]   PCP:  Harris Moore MD  Treatment Team: Attending Provider: Di Figueroa MD; Consulting Provider: Francisco Edgar MD; Nurse Practitioner: Verena Scott NP    Subjective:   HPI and or CC:  47 y.o  male w/ PMH of Diverticulosis s/p partial colectomy and GERD s/p Nissen fundoplication admitted  for rectal bleed. Stated 4 day onset of dark stools then stated 4 large bright red stools . Last colonoscopy 1 year ago showing diverticulosis. No complaints of N/V/D. Hemoglobin drop drom 12.6 to 9.4 now. Gi consulted and will provide supportive care at this time. :  Patient seen earlier this am, stable, hemoglobin 9.4 and at that time no bleeding, N/V/D. Vitals stable. Initially planned for discharge if tolerating diet, had full liquid diet. Later in the morning Rapid response called on patient for syncopal episode after going to [facility] and having large, melena stool. Placed in bed, blood pressure 66P systolic. Patient noted pale, diaphoretic but alert. Per family, patient had another large, melena stool while in bed. Currently receiving NS bolus, IVF to continue  ml/h. H&H Q4H. CT done of abdomen last night shows diverticulosis. Stat hemoglobin 10.1. Recheck of patient shows skin color pink/dry. Alert and oriented x3. Message sent via [employer] to GI alerting them to change in status. NM acute GI bleed scan also ordered. Patient NPO for now, update provided to family and patient.          Objective:     Patient Vitals for the past 24 hrs:   Temp Pulse Resp BP SpO2   20 1112 97.9 °F (36.6 °C) 75 17 93/64 100 %   20 0752 99.4 °F (37.4 °C) 75 18 109/69 96 %   20 0317 97.3 °F (36.3 °C) 71 18 101/64    20 0018 98 °F (36.7 °C) 95  101/65 95 %   02/15/20 1903 97.4 °F (36.3 °C) 84 17 98/67 97 %   02/15/20 1758 97.7 °F (36.5 °C) 84 17 128/87 94 %   02/15/20 1313  88 16 115/67 98 %   02/15/20 1140 97.9 °F (36.6 °C) 75 18 93/55 95 %     Oxygen Therapy  O2 Sat (%): 100 % (02/16/20 1112)  Pulse via Oximetry: 71 beats per minute (02/15/20 0359)  O2 Device: Nasal cannula (02/16/20 1112)  O2 Flow Rate (L/min): 2 l/min (02/16/20 1112)    Intake/Output Summary (Last 24 hours) at 2/16/2020 1127  Last data filed at 2/16/2020 0541  Gross per 24 hour   Intake 2586 ml   Output 1650 ml   Net 936 ml         REVIEW OF SYSTEMS: Comprehensive ROS performed and negative except as stated in HPI. Physical Examination:  General:    Well nourished. No gross distress. Head:  Normocephalic, atraumatic, nares patent  Eyes:  Extraocular movements intact, normal sclera  CV:   RRR. No  Murmurs, clicks, or gallops, distal pulses intact  Lungs:   Unlabored, no cyanosis, no wheeze  Abdomen:   Soft, nondistended, nontender. Extremities: Warm and dry. No cyanosis or edema. Skin:     No rashes or jaundice. Neuro:  No gross focal deficits, no tremor  Psych:  Mood and affect appropriate    Data Review:  I have reviewed all labs, meds, telemetry events, and studies from the last 24 hours.     Recent Results (from the past 24 hour(s))   HEMOGLOBIN    Collection Time: 02/15/20 12:07 PM   Result Value Ref Range    HGB 9.8 (L) 13.6 - 17.2 g/dL   HEMOGLOBIN    Collection Time: 02/15/20  7:38 PM   Result Value Ref Range    HGB 8.8 (L) 13.6 - 17.2 g/dL   CBC WITH AUTOMATED DIFF    Collection Time: 02/16/20  4:51 AM   Result Value Ref Range    WBC 3.3 (L) 4.3 - 11.1 K/uL    RBC 2.96 (L) 4.23 - 5.6 M/uL    HGB 9.4 (L) 13.6 - 17.2 g/dL    HCT 29.7 (L) 41.1 - 50.3 %    .3 (H) 79.6 - 97.8 FL    MCH 31.8 26.1 - 32.9 PG    MCHC 31.6 31.4 - 35.0 g/dL    RDW 12.6 11.9 - 14.6 %    PLATELET 996 (L) 130 - 450 K/uL    MPV 9.1 (L) 9.4 - 12.3 FL    ABSOLUTE NRBC 0.00 0.0 - 0.2 K/uL    DF AUTOMATED      NEUTROPHILS 40 (L) 43 - 78 %    LYMPHOCYTES 48 (H) 13 - 44 % MONOCYTES 7 4.0 - 12.0 %    EOSINOPHILS 4 0.5 - 7.8 %    BASOPHILS 0 0.0 - 2.0 %    IMMATURE GRANULOCYTES 0 0.0 - 5.0 %    ABS. NEUTROPHILS 1.3 (L) 1.7 - 8.2 K/UL    ABS. LYMPHOCYTES 1.6 0.5 - 4.6 K/UL    ABS. MONOCYTES 0.2 0.1 - 1.3 K/UL    ABS. EOSINOPHILS 0.1 0.0 - 0.8 K/UL    ABS. BASOPHILS 0.0 0.0 - 0.2 K/UL    ABS. IMM. GRANS. 0.0 0.0 - 0.5 K/UL   METABOLIC PANEL, BASIC    Collection Time: 02/16/20  4:51 AM   Result Value Ref Range    Sodium 146 (H) 136 - 145 mmol/L    Potassium 3.9 3.5 - 5.1 mmol/L    Chloride 115 (H) 98 - 107 mmol/L    CO2 28 21 - 32 mmol/L    Anion gap 3 (L) 7 - 16 mmol/L    Glucose 107 (H) 65 - 100 mg/dL    BUN 10 6 - 23 MG/DL    Creatinine 0.78 (L) 0.8 - 1.5 MG/DL    GFR est AA >60 >60 ml/min/1.73m2    GFR est non-AA >60 >60 ml/min/1.73m2    Calcium 7.7 (L) 8.3 - 10.4 MG/DL   GLUCOSE, POC    Collection Time: 02/16/20 10:47 AM   Result Value Ref Range    Glucose (POC) 107 (H) 65 - 100 mg/dL        All Micro Results     None          Current Meds:  Current Facility-Administered Medications   Medication Dose Route Frequency    pantoprazole (PROTONIX) 40 mg in 0.9% sodium chloride 10 mL injection  40 mg IntraVENous Q12H    sodium chloride 0.9 % bolus infusion 1,000 mL  1,000 mL IntraVENous ONCE    lactated Ringers infusion  150 mL/hr IntraVENous CONTINUOUS    sodium chloride (NS) flush 5-40 mL  5-40 mL IntraVENous Q8H    sodium chloride (NS) flush 5-40 mL  5-40 mL IntraVENous PRN    influenza vaccine 2019-20 (6 mos+)(PF) (FLUARIX/FLULAVAL/FLUZONE QUAD) injection 0.5 mL  0.5 mL IntraMUSCular PRIOR TO DISCHARGE    diphenhydrAMINE (BENADRYL) capsule 25 mg  25 mg Oral QHS PRN       Diet:  DIET NPO    Other Studies (last 24 hours):  No results found.     Assessment and Plan:     Hospital Problems as of 2/16/2020 Date Reviewed: 2/15/2020          Codes Class Noted - Resolved POA    * (Principal) GI bleed ICD-10-CM: K92.2  ICD-9-CM: 578.9  2/15/2020 - Present Unknown        Diverticulosis (Chronic) ICD-10-CM: K57.90  ICD-9-CM: 562.10  2/15/2020 - Present Unknown              A/P:    1) GI bleed   Likely lower GI, diverticular given history and presentation, GI consulted, continue IV hydration, trend H&H Q4H, transfuse for hemoglobin less than 7. NM for acute GI bleed ordered. Protonix IV bid. CT of abdomen done last night showing diverticulosis.    2) GERD   History of Nissen fundoplication, currently asymptomatic.       Signed:  JAI Londono

## 2020-02-16 NOTE — PROGRESS NOTES
1045 - Rapid response called for syncopal episode with  large bloody stool. Pt diaphoretic and very pale. Pt carried to bed by staff. 65 - MD arrived. Stat H&H ordered. O2 started via  NC @6L/min.    LC=974   SO=909/101   P=87   O2=99%   Code team arrived including Eh Thomas NP,  attending Dr. Clare Aldana, & Dr. Barbara Bentley. Pt awake & oriented X3.  1047 - NS bolus ordered & started. 1049 - BP=98/68   P=81  1052  - new IV placed in R arm   Pt's color returning. 1054 - FD=879/63     Upon cleaning up pt after code team's exit, large amount of bloody stool found in pt's bed. Pt resting in bed with HOB flat. LR drip ordered, Protonix ordered, NM BI bleed scan ordered, H&H Q4 ordered. Will continue to monitor pt closely.

## 2020-02-16 NOTE — PROGRESS NOTES
GI DAILY PROGRESS NOTE    Admit Date:  2/15/2020    Today's Date:  2/16/2020    CC:  Lower GI Bleed    Subjective:     Patient with single episode hematochezia this AM with associated hypotension. Currently stable. Discussed treatment plan. Negative for abdominal pain, N/V, fevers/chills. Medications:   Current Facility-Administered Medications   Medication Dose Route Frequency    pantoprazole (PROTONIX) 40 mg in 0.9% sodium chloride 10 mL injection  40 mg IntraVENous Q12H    lactated Ringers infusion  150 mL/hr IntraVENous CONTINUOUS    sodium chloride (NS) flush 5-40 mL  5-40 mL IntraVENous Q8H    sodium chloride (NS) flush 5-40 mL  5-40 mL IntraVENous PRN    influenza vaccine 2019-20 (6 mos+)(PF) (FLUARIX/FLULAVAL/FLUZONE QUAD) injection 0.5 mL  0.5 mL IntraMUSCular PRIOR TO DISCHARGE    diphenhydrAMINE (BENADRYL) capsule 25 mg  25 mg Oral QHS PRN       Review of Systems:  ROS was obtained, with pertinent positives as listed above. No chest pain or SOB. Diet:  NPO    Objective:   Vitals:  Visit Vitals  BP 95/55 (BP Patient Position: At rest;Sitting)   Pulse 75   Temp 98 °F (36.7 °C)   Resp 16   Ht 5' 8\" (1.727 m)   Wt 88 kg (194 lb)   SpO2 97%   BMI 29.50 kg/m²     Intake/Output:  No intake/output data recorded.   02/14 1901 - 02/16 0700  In: 3786 [I.V.:3786]  Out: 1650 [Urine:1650]  Exam:  General appearance: NAD  HEENT: anicteric sclera, mmm  Lungs: clear to auscultation bilaterally anteriorly  Heart: regular rate and rhythm  Abdomen: soft, NT, ND, +BS  Neuro:  alert and oriented x3    Data Review (Labs):    Recent Labs     02/16/20  1051 02/16/20  0451 02/15/20  1938 02/15/20  1207 02/15/20  0207 02/15/20  0014   WBC  --  3.3*  --   --  10.1 7.9   HGB 10.1* 9.4* 8.8* 9.8* 10.8* 12.6*   HCT  --  29.7*  --   --  33.4* 38.8*   PLT  --  148*  --   --  179 216   MCV  --  100.3*  --   --  99.1* 98.5*   NA  --  146*  --   --   --  142   K  --  3.9  --   --   --  4.0   CL  --  115*  --   --   --  110* CO2  --  28  --   --   --  27   BUN  --  10  --   --   --  30*   CREA  --  0.78*  --   --   --  0.84   CA  --  7.7*  --   --   --  8.3   GLU  --  107*  --   --   --  120*   AP  --   --   --   --   --  37*   SGOT  --   --   --   --   --  11*   ALB  --   --   --   --   --  3.4*   TP  --   --   --   --   --  6.3   PTP  --   --   --   --   --  13.0   INR  --   --   --   --   --  1.0   APTT  --   --   --   --   --  25.0       Assessment:     Principal Problem:    GI bleed (2/15/2020)    Active Problems:    Diverticulosis (2/15/2020)    Patient is a 47 y.o. male with PMH of etoh abuse, anxiety, cardiomyopathy, diverticulitis s/p segmental colon resection, GERD, s/p Nissen fundoplication, HTN, who is here with lower GI bleeding consistent with diverticular bleed. Single episode hematochezia with transient drop in Hgb this AM. HGB 10.1. He does have hx of diverticulosis of colon. No abdominal pain. EGD 1/9/13 with hiatal hernia. Colonoscopy 3/9/18 with diverticulosis of colon. Repeat colonoscopy exam for screening due 3/2028. Plan:     1. NM bleed scan pending. If positive will consult IR for treatment of diverticular bleeding. 2. Continue to monitor Hgb and transfuse pRBC PRN for goal hgb of 8-9.  3. Avoid NSAIDS/heparin/lovenox  4.  Continue supportive care measures    Will follow    Harmony Man

## 2020-02-16 NOTE — PROGRESS NOTES
Received call from nurse. Patient complaining of feeling \"foggy\" and irritable. Per nurse, patient remains alert and oriented x3. Hemoglobin repeat done with results pending. Vital signs stable. H/O of anxiety, will order Ativan oral prn. Also CT of head for now and continue to observe. [email or web address]

## 2020-02-16 NOTE — PROGRESS NOTES
Responded to RR. Patient admitted for ABLA secondary to probable diverticular bleed. GI consulted. Hb at admission was 12.6 (range 13-15 months prior). Has trended to 9.4 this AM. Had CT a/p w contrast showing sigmoid diverticulosis. Had large bloody BM just now and syncopal episode. Was carried back to bed. Very pale and clammy appearing. /100, HR 90s, On 97% on RA. He's awake and oriented x3. Large amt of bloody stool in toilet. Stat H/H pending. Ankur Hardin NP present along with attending Dr. Jones Guadalupe.      Alvaro Griffin MD

## 2020-02-17 LAB
ANION GAP SERPL CALC-SCNC: 5 MMOL/L (ref 7–16)
BASOPHILS # BLD: 0 K/UL (ref 0–0.2)
BASOPHILS NFR BLD: 0 % (ref 0–2)
BUN SERPL-MCNC: 12 MG/DL (ref 6–23)
CALCIUM SERPL-MCNC: 7.7 MG/DL (ref 8.3–10.4)
CHLORIDE SERPL-SCNC: 113 MMOL/L (ref 98–107)
CO2 SERPL-SCNC: 26 MMOL/L (ref 21–32)
CREAT SERPL-MCNC: 0.61 MG/DL (ref 0.8–1.5)
DIFFERENTIAL METHOD BLD: ABNORMAL
EOSINOPHIL # BLD: 0.1 K/UL (ref 0–0.8)
EOSINOPHIL NFR BLD: 2 % (ref 0.5–7.8)
ERYTHROCYTE [DISTWIDTH] IN BLOOD BY AUTOMATED COUNT: 12.4 % (ref 11.9–14.6)
GLUCOSE SERPL-MCNC: 99 MG/DL (ref 65–100)
HCT VFR BLD AUTO: 23.4 % (ref 41.1–50.3)
HGB BLD-MCNC: 7.8 G/DL (ref 13.6–17.2)
HGB BLD-MCNC: 7.8 G/DL (ref 13.6–17.2)
HGB BLD-MCNC: 8.4 G/DL (ref 13.6–17.2)
HGB BLD-MCNC: 8.5 G/DL (ref 13.6–17.2)
IMM GRANULOCYTES # BLD AUTO: 0 K/UL (ref 0–0.5)
IMM GRANULOCYTES NFR BLD AUTO: 0 % (ref 0–5)
LYMPHOCYTES # BLD: 2.1 K/UL (ref 0.5–4.6)
LYMPHOCYTES NFR BLD: 43 % (ref 13–44)
MAGNESIUM SERPL-MCNC: 2 MG/DL (ref 1.8–2.4)
MCH RBC QN AUTO: 32.4 PG (ref 26.1–32.9)
MCHC RBC AUTO-ENTMCNC: 33.3 G/DL (ref 31.4–35)
MCV RBC AUTO: 97.1 FL (ref 79.6–97.8)
MONOCYTES # BLD: 0.3 K/UL (ref 0.1–1.3)
MONOCYTES NFR BLD: 6 % (ref 4–12)
NEUTS SEG # BLD: 2.3 K/UL (ref 1.7–8.2)
NEUTS SEG NFR BLD: 49 % (ref 43–78)
NRBC # BLD: 0 K/UL (ref 0–0.2)
PLATELET # BLD AUTO: 147 K/UL (ref 150–450)
PMV BLD AUTO: 9.3 FL (ref 9.4–12.3)
POTASSIUM SERPL-SCNC: 3.2 MMOL/L (ref 3.5–5.1)
RBC # BLD AUTO: 2.41 M/UL (ref 4.23–5.6)
SODIUM SERPL-SCNC: 144 MMOL/L (ref 136–145)
WBC # BLD AUTO: 4.8 K/UL (ref 4.3–11.1)

## 2020-02-17 PROCEDURE — 74011250637 HC RX REV CODE- 250/637: Performed by: INTERNAL MEDICINE

## 2020-02-17 PROCEDURE — 83735 ASSAY OF MAGNESIUM: CPT

## 2020-02-17 PROCEDURE — 36415 COLL VENOUS BLD VENIPUNCTURE: CPT

## 2020-02-17 PROCEDURE — 65270000029 HC RM PRIVATE

## 2020-02-17 PROCEDURE — 74011000250 HC RX REV CODE- 250: Performed by: NURSE PRACTITIONER

## 2020-02-17 PROCEDURE — P9016 RBC LEUKOCYTES REDUCED: HCPCS

## 2020-02-17 PROCEDURE — 85018 HEMOGLOBIN: CPT

## 2020-02-17 PROCEDURE — 85025 COMPLETE CBC W/AUTO DIFF WBC: CPT

## 2020-02-17 PROCEDURE — C9113 INJ PANTOPRAZOLE SODIUM, VIA: HCPCS | Performed by: NURSE PRACTITIONER

## 2020-02-17 PROCEDURE — 74011250636 HC RX REV CODE- 250/636: Performed by: INTERNAL MEDICINE

## 2020-02-17 PROCEDURE — 80048 BASIC METABOLIC PNL TOTAL CA: CPT

## 2020-02-17 PROCEDURE — 36430 TRANSFUSION BLD/BLD COMPNT: CPT

## 2020-02-17 PROCEDURE — 74011250636 HC RX REV CODE- 250/636: Performed by: NURSE PRACTITIONER

## 2020-02-17 RX ORDER — POTASSIUM CHLORIDE 7.45 MG/ML
10 INJECTION INTRAVENOUS
Status: COMPLETED | OUTPATIENT
Start: 2020-02-17 | End: 2020-02-17

## 2020-02-17 RX ORDER — SODIUM CHLORIDE 9 MG/ML
250 INJECTION, SOLUTION INTRAVENOUS AS NEEDED
Status: DISCONTINUED | OUTPATIENT
Start: 2020-02-17 | End: 2020-02-18 | Stop reason: HOSPADM

## 2020-02-17 RX ORDER — ACETAMINOPHEN 500 MG
500 TABLET ORAL
Status: DISCONTINUED | OUTPATIENT
Start: 2020-02-17 | End: 2020-02-18 | Stop reason: HOSPADM

## 2020-02-17 RX ADMIN — Medication 10 ML: at 20:34

## 2020-02-17 RX ADMIN — ACETAMINOPHEN 500 MG: 500 TABLET, FILM COATED ORAL at 20:32

## 2020-02-17 RX ADMIN — POTASSIUM CHLORIDE 10 MEQ: 7.46 INJECTION, SOLUTION INTRAVENOUS at 11:23

## 2020-02-17 RX ADMIN — POTASSIUM CHLORIDE 10 MEQ: 7.46 INJECTION, SOLUTION INTRAVENOUS at 10:06

## 2020-02-17 RX ADMIN — ACETAMINOPHEN 500 MG: 500 TABLET, FILM COATED ORAL at 08:41

## 2020-02-17 RX ADMIN — PANTOPRAZOLE SODIUM 40 MG: 40 INJECTION, POWDER, FOR SOLUTION INTRAVENOUS at 20:33

## 2020-02-17 RX ADMIN — SODIUM CHLORIDE, SODIUM LACTATE, POTASSIUM CHLORIDE, AND CALCIUM CHLORIDE 150 ML/HR: 600; 310; 30; 20 INJECTION, SOLUTION INTRAVENOUS at 03:00

## 2020-02-17 RX ADMIN — Medication 10 ML: at 05:38

## 2020-02-17 RX ADMIN — SODIUM CHLORIDE, SODIUM LACTATE, POTASSIUM CHLORIDE, AND CALCIUM CHLORIDE 150 ML/HR: 600; 310; 30; 20 INJECTION, SOLUTION INTRAVENOUS at 20:27

## 2020-02-17 RX ADMIN — POTASSIUM CHLORIDE 10 MEQ: 7.46 INJECTION, SOLUTION INTRAVENOUS at 09:09

## 2020-02-17 RX ADMIN — SODIUM CHLORIDE, SODIUM LACTATE, POTASSIUM CHLORIDE, AND CALCIUM CHLORIDE 150 ML/HR: 600; 310; 30; 20 INJECTION, SOLUTION INTRAVENOUS at 08:46

## 2020-02-17 RX ADMIN — DIPHENHYDRAMINE HYDROCHLORIDE 25 MG: 25 CAPSULE ORAL at 20:32

## 2020-02-17 RX ADMIN — PANTOPRAZOLE SODIUM 40 MG: 40 INJECTION, POWDER, FOR SOLUTION INTRAVENOUS at 08:41

## 2020-02-17 NOTE — PROGRESS NOTES
END OF SHIFT NOTE:    INTAKE/OUTPUT  02/16 0701 - 02/17 0700  In: 3093 [I.V.:3093]  Out: 1050 [Urine:1050]  Voiding: YES  Catheter: NO  Drain:              Flatus: Patient does have flatus present. Stool:  1 occurrences. Characteristics:  Stool Assessment  Stool Color: Brown  Stool Appearance: Loose  Stool Amount: Small  Stool Source/Status: Rectum    Emesis: 0 occurrences. Characteristics:        VITAL SIGNS  Patient Vitals for the past 12 hrs:   Temp Pulse Resp BP SpO2   02/17/20 1500 97.5 °F (36.4 °C) 73 16 117/66 100 %   02/17/20 1230 97.5 °F (36.4 °C) 75 16 109/68 98 %   02/17/20 1214 98.3 °F (36.8 °C) 78 16 104/72 98 %   02/17/20 1055 97.7 °F (36.5 °C) 74 16 121/73 99 %   02/17/20 0745 98.1 °F (36.7 °C) 91 16 100/61 96 %       Pain Assessment  Pain Intensity 1: 0 (02/17/20 0935)  Pain Location 1: Head  Pain Intervention(s) 1: Medication (see MAR)  Patient Stated Pain Goal: 0    Ambulating  Yes    Shift report given to oncoming nurse at the bedside.     Cynthia Isidro RN

## 2020-02-17 NOTE — PROGRESS NOTES
END OF SHIFT NOTE:    INTAKE/OUTPUT  02/15 0701 - 02/16 0700  In: 2586 [I.V.:2586]  Out: 1610 [Urine:1650]  Voiding: YES  Catheter: NO  Drain:              Flatus: Patient does have flatus present. Stool:  1 occurrences. Characteristics:  Stool Assessment  Stool Color: Maroon  Stool Appearance: Bloody  Stool Amount: Large  Stool Source/Status: Rectum    Emesis: 0 occurrences. Characteristics:        VITAL SIGNS  Patient Vitals for the past 12 hrs:   Temp Pulse Resp BP SpO2   02/16/20 1634 98.3 °F (36.8 °C) 77 16 103/68 99 %   02/16/20 1544 98.6 °F (37 °C) 87 16 95/57 98 %   02/16/20 1358    95/55    02/16/20 1207 98 °F (36.7 °C) 75 16 93/51 97 %   02/16/20 1112 97.9 °F (36.6 °C) 75 17 93/64 100 %   02/16/20 0752 99.4 °F (37.4 °C) 75 18 109/69 96 %       Pain Assessment  Pain Intensity 1: 0 (02/16/20 1358)  Pain Location 1: Abdomen  Pain Intervention(s) 1: Family Support, Emotional support  Patient Stated Pain Goal: 0    Ambulating  Yes    Shift report given to oncoming nurse at the bedside.     Karin Glaser

## 2020-02-17 NOTE — PROGRESS NOTES
END OF SHIFT NOTE:    INTAKE/OUTPUT  02/16 0701 - 02/17 0700  In: 2673 [I.V.:2673]  Out: 700 [Urine:700]  Voiding: YES  Catheter: NO  Drain:              Flatus: Patient does have flatus present. Stool:  0 occurrences. Characteristics:  Stool Assessment  Stool Color: Maroon  Stool Appearance: Bloody  Stool Amount: Large  Stool Source/Status: Rectum    Emesis: 0 occurrences. Characteristics:        VITAL SIGNS  Patient Vitals for the past 12 hrs:   Temp Pulse Resp BP SpO2   02/17/20 0348 98.2 °F (36.8 °C) 85 16 95/55 96 %   02/16/20 2317 98.9 °F (37.2 °C) 91 16 101/61 94 %   02/16/20 1938 98.5 °F (36.9 °C) 75 16 109/58 96 %       Pain Assessment  Pain Intensity 1: 0 (02/17/20 0051)  Pain Location 1: Abdomen  Pain Intervention(s) 1: Family Support, Emotional support  Patient Stated Pain Goal: 0    Ambulating  Not oob this shift. No obvious bleeding this shift. Bp  systolic. Shift report given to oncoming nurse at the bedside.     Sarah Lee RN

## 2020-02-17 NOTE — PROGRESS NOTES
Patient is calm  Alert  Family present     knows family  Encouraged    Fabien Vogt, staff [address], [address] [facility]  /   [email or web address]

## 2020-02-17 NOTE — PROGRESS NOTES
Hospitalist Progress Note     Admit Date:  2/15/2020 12:09 AM   Name:  Jin Juan   Age:  47 y.o.  :  1965   MRN:  [id number]   PCP:  Kasandra Harris MD  Treatment Team: Attending Provider: Radha Valles MD; Utilization Review: Viri Pack; Consulting Provider: Lissy Zepeda MD    Subjective:   HPI and or CC:  47 y. o  male w/ PMH of Diverticulosis s/p partial colectomy and GERD s/p Nissen fundoplication admitted  for rectal bleed. Stated 4 day onset of dark stools then stated 4 large bright red stools . Last colonoscopy 1 year ago showing diverticulosis.    No complaints of N/V/D. Hemoglobin drop drom 12.6 to 9.4 now. Gi consulted and will provide supportive care at this time.  patient seen and examined at bedside. Wife at bedside. No overnight events. \"brain fog\" improving. No bloody bowel movements since yesterday. Passing gas. Will have clears today. Nursing notes and chart reviewed. Review of Systems negative with exception of pertinent positives noted above.       Objective:     Patient Vitals for the past 24 hrs:   Temp Pulse Resp BP SpO2   20 0745 98.1 °F (36.7 °C) 91 16 100/61 96 %   20 0348 98.2 °F (36.8 °C) 85 16 95/55 96 %   20 2317 98.9 °F (37.2 °C) 91 16 101/61 94 %   20 1938 98.5 °F (36.9 °C) 75 16 109/58 96 %   20 1634 98.3 °F (36.8 °C) 77 16 103/68 99 %   20 1544 98.6 °F (37 °C) 87 16 95/57 98 %   20 1358    95/55    20 1207 98 °F (36.7 °C) 75 16 93/51 97 %   20 1112 97.9 °F (36.6 °C) 75 17 93/64 100 %     Oxygen Therapy  O2 Sat (%): 96 % (20 0745)  Pulse via Oximetry: 71 beats per minute (02/15/20 0359)  O2 Device: Nasal cannula (20 193)  O2 Flow Rate (L/min): 2 l/min (20 1112)    Intake/Output Summary (Last 24 hours) at 2020 0921  Last data filed at 2020 0539  Gross per 24 hour   Intake 3093 ml   Output 1050 ml   Net 2043 ml         General:    Well nourished. Alert. Wearing glasses. Normal color. CV:   RRR. No murmur, rub, or gallop. Lungs:   CTAB. No wheezing, rhonchi, or rales. Abdomen:   Soft, nontender, nondistended. Positive bowel sounds  Extremities: Warm and dry. No cyanosis or edema. Skin:     No rashes or jaundice. Data Review:  I have reviewed all labs, meds, telemetry events, and studies from the last 24 hours. Recent Results (from the past 24 hour(s))   GLUCOSE, POC    Collection Time: 02/16/20 10:47 AM   Result Value Ref Range    Glucose (POC) 107 (H) 65 - 100 mg/dL   HEMOGLOBIN    Collection Time: 02/16/20 10:51 AM   Result Value Ref Range    HGB 10.1 (L) 13.6 - 17.2 g/dL   HEMOGLOBIN    Collection Time: 02/16/20  4:03 PM   Result Value Ref Range    HGB 8.5 (L) 13.6 - 17.2 g/dL   HEMOGLOBIN    Collection Time: 02/16/20 10:38 PM   Result Value Ref Range    HGB 8.0 (L) 13.6 - 17.2 g/dL   CBC WITH AUTOMATED DIFF    Collection Time: 02/17/20  1:40 AM   Result Value Ref Range    WBC 4.8 4.3 - 11.1 K/uL    RBC 2.41 (L) 4.23 - 5.6 M/uL    HGB 7.8 (L) 13.6 - 17.2 g/dL    HCT 23.4 (L) 41.1 - 50.3 %    MCV 97.1 79.6 - 97.8 FL    MCH 32.4 26.1 - 32.9 PG    MCHC 33.3 31.4 - 35.0 g/dL    RDW 12.4 11.9 - 14.6 %    PLATELET 358 (L) 483 - 450 K/uL    MPV 9.3 (L) 9.4 - 12.3 FL    ABSOLUTE NRBC 0.00 0.0 - 0.2 K/uL    DF AUTOMATED      NEUTROPHILS 49 43 - 78 %    LYMPHOCYTES 43 13 - 44 %    MONOCYTES 6 4.0 - 12.0 %    EOSINOPHILS 2 0.5 - 7.8 %    BASOPHILS 0 0.0 - 2.0 %    IMMATURE GRANULOCYTES 0 0.0 - 5.0 %    ABS. NEUTROPHILS 2.3 1.7 - 8.2 K/UL    ABS. LYMPHOCYTES 2.1 0.5 - 4.6 K/UL    ABS. MONOCYTES 0.3 0.1 - 1.3 K/UL    ABS. EOSINOPHILS 0.1 0.0 - 0.8 K/UL    ABS. BASOPHILS 0.0 0.0 - 0.2 K/UL    ABS. IMM.  GRANS. 0.0 0.0 - 0.5 K/UL   METABOLIC PANEL, BASIC    Collection Time: 02/17/20  1:40 AM   Result Value Ref Range    Sodium 144 136 - 145 mmol/L    Potassium 3.2 (L) 3.5 - 5.1 mmol/L    Chloride 113 (H) 98 - 107 mmol/L    CO2 26 21 - 32 mmol/L Anion gap 5 (L) 7 - 16 mmol/L    Glucose 99 65 - 100 mg/dL    BUN 12 6 - 23 MG/DL    Creatinine 0.61 (L) 0.8 - 1.5 MG/DL    GFR est AA >60 >60 ml/min/1.73m2    GFR est non-AA >60 >60 ml/min/1.73m2    Calcium 7.7 (L) 8.3 - 10.4 MG/DL   HEMOGLOBIN    Collection Time: 02/17/20  5:18 AM   Result Value Ref Range    HGB 7.8 (L) 13.6 - 17.2 g/dL   MAGNESIUM    Collection Time: 02/17/20  5:18 AM   Result Value Ref Range    Magnesium 2.0 1.8 - 2.4 mg/dL        All Micro Results     None          Current Meds:  Current Facility-Administered Medications   Medication Dose Route Frequency    potassium chloride 10 mEq in 100 ml IVPB  10 mEq IntraVENous Q1H    acetaminophen (TYLENOL) tablet 500 mg  500 mg Oral Q4H PRN    0.9% sodium chloride infusion 250 mL  250 mL IntraVENous PRN    pantoprazole (PROTONIX) 40 mg in 0.9% sodium chloride 10 mL injection  40 mg IntraVENous Q12H    lactated Ringers infusion  150 mL/hr IntraVENous CONTINUOUS    LORazepam (ATIVAN) tablet 0.5 mg  0.5 mg Oral Q4H PRN    sodium chloride (NS) flush 5-40 mL  5-40 mL IntraVENous Q8H    sodium chloride (NS) flush 5-40 mL  5-40 mL IntraVENous PRN    influenza vaccine 2019-20 (6 mos+)(PF) (FLUARIX/FLULAVAL/FLUZONE QUAD) injection 0.5 mL  0.5 mL IntraMUSCular PRIOR TO DISCHARGE    diphenhydrAMINE (BENADRYL) capsule 25 mg  25 mg Oral QHS PRN       Other Studies (last 24 hours):  Nm Acute Gi Bleed Scan    Result Date: 2/16/2020  NUCLEAR MEDICINE GI BLEEDING SCAN 2/16/2020 HISTORY: GI bleed. Rectal bleeding. Diverticulosis status post partial colectomy. TECHNIQUE: 22.5 mCi technetium 99m pertechnetate tagged autologous red blood cells was administered intravenously and imaging of the abdomen was performed. FINDINGS: Normal activity is present in the blood pool and reticuloendothelial system. There is no definite GI activity suggestive of active GI bleeding. IMPRESSION: No active GI bleeding demonstrated.     Ct Head Wo Cont    Result Date: 2/16/2020  HEAD CT WITHOUT CONTRAST  2/16/2020 HISTORY:   confusion patient reports subjectively  ; slurred speech TECHNIQUE: Noncontrast axial images were obtained through the brain. All CT scans at this facility used dose modulation, interactive reconstruction and/or weight based dosing when appropriate to reduce radiation dose to as low as reasonably achievable. COMPARISON: None FINDINGS: There is no acute intracranial hemorrhage, significant mass effect or CT evidence of acute large artery territorial infarction. Please note that a hyperacute infarct or small vessel infarct may not be apparent on initial CT imaging. There is no hydrocephalus , intra-axial mass or abnormal extra-axial fluid collection. There are no displaced skull fractures. The mastoid air cells and paranasal sinuses are clear where imaged. IMPRESSION: No acute findings. Assessment and Plan:     Hospital Problems as of 2/17/2020 Date Reviewed: 2/15/2020          Codes Class Noted - Resolved POA    * (Principal) GI bleed ICD-10-CM: K92.2  ICD-9-CM: 578.9  2/15/2020 - Present Unknown        Diverticulosis (Chronic) ICD-10-CM: K57.90  ICD-9-CM: 562.10  2/15/2020 - Present Unknown              PLAN:    #GI bleed, Anemia due to acute blood loss   Likely lower GI, diverticular given history and presentation,   continue IV hydration, trend H&H Q8H today, transfuse for hemoglobin less than 8. NM for acute GI bleed showed no active bleed  Follow up GI recommendations. Protonix IV bid. CT of abdomen done last night showing diverticulosis.   clears     #GERD   History of Nissen fundoplication, currently asymptomatic.     # HTN  - hold home blood pressure medications    DC planning/Dispo:  Home when able  DVT ppx:  SCD    Signed:  Jennifer Cavanaugh MD

## 2020-02-17 NOTE — PROGRESS NOTES
Called lab R/T hgb that is ordered every 4 hours. Have not came to draw lab and should have been drawn at 8pm.  States they will come now and draw labs.

## 2020-02-17 NOTE — PROGRESS NOTES
GI DAILY PROGRESS NOTE    Admit Date:  2/15/2020    Today's Date:  2/17/2020    CC:  Lower GI Bleed    Subjective:     Last rectal bleeding noon yesterday. Passing flatus. NM GI scan negative. Has HA and weak but reports feeling good otherwise. Medications:   Current Facility-Administered Medications   Medication Dose Route Frequency    potassium chloride 10 mEq in 100 ml IVPB  10 mEq IntraVENous Q1H    acetaminophen (TYLENOL) tablet 500 mg  500 mg Oral Q4H PRN    pantoprazole (PROTONIX) 40 mg in 0.9% sodium chloride 10 mL injection  40 mg IntraVENous Q12H    lactated Ringers infusion  150 mL/hr IntraVENous CONTINUOUS    LORazepam (ATIVAN) tablet 0.5 mg  0.5 mg Oral Q4H PRN    sodium chloride (NS) flush 5-40 mL  5-40 mL IntraVENous Q8H    sodium chloride (NS) flush 5-40 mL  5-40 mL IntraVENous PRN    influenza vaccine 2019-20 (6 mos+)(PF) (FLUARIX/FLULAVAL/FLUZONE QUAD) injection 0.5 mL  0.5 mL IntraMUSCular PRIOR TO DISCHARGE    diphenhydrAMINE (BENADRYL) capsule 25 mg  25 mg Oral QHS PRN       Review of Systems:  ROS was obtained, with pertinent positives as listed above. No chest pain or SOB. Diet:  NPO    Objective:   Vitals:  Visit Vitals  /61   Pulse 91   Temp 98.1 °F (36.7 °C)   Resp 16   Ht 5' 8\" (1.727 m)   Wt 88 kg (194 lb)   SpO2 96%   BMI 29.50 kg/m²     Intake/Output:  No intake/output data recorded.   02/15 1901 - 02/17 0700  In: 2193 [I.V.:5679]  Out: 2350 [Urine:2350]  Exam:  General appearance: NAD  HEENT: anicteric sclera, mmm  Lungs: clear to auscultation bilaterally anteriorly  Heart: regular rate and rhythm  Abdomen: soft, NT, ND, +BS  Neuro:  alert and oriented x3    Data Review (Labs):    Recent Labs     02/17/20  0518 02/17/20  0140 02/16/20  2238 02/16/20  1603 02/16/20  1051 02/16/20  0451 02/15/20  1938 02/15/20  1207 02/15/20  0207 02/15/20  0014   WBC  --  4.8  --   --   --  3.3*  --   --  10.1 7.9   HGB 7.8* 7.8* 8.0* 8.5* 10.1* 9.4* 8.8* 9.8* 10.8* 12.6*   HCT --  23.4*  --   --   --  29.7*  --   --  33.4* 38.8*   PLT  --  147*  --   --   --  148*  --   --  179 216   MCV  --  97.1  --   --   --  100.3*  --   --  99.1* 98.5*   NA  --  144  --   --   --  146*  --   --   --  142   K  --  3.2*  --   --   --  3.9  --   --   --  4.0   CL  --  113*  --   --   --  115*  --   --   --  110*   CO2  --  26  --   --   --  28  --   --   --  27   BUN  --  12  --   --   --  10  --   --   --  30*   CREA  --  0.61*  --   --   --  0.78*  --   --   --  0.84   CA  --  7.7*  --   --   --  7.7*  --   --   --  8.3   MG 2.0  --   --   --   --   --   --   --   --   --    GLU  --  99  --   --   --  107*  --   --   --  120*   AP  --   --   --   --   --   --   --   --   --  37*   SGOT  --   --   --   --   --   --   --   --   --  11*   ALB  --   --   --   --   --   --   --   --   --  3.4*   TP  --   --   --   --   --   --   --   --   --  6.3   PTP  --   --   --   --   --   --   --   --   --  13.0   INR  --   --   --   --   --   --   --   --   --  1.0   APTT  --   --   --   --   --   --   --   --   --  25.0     NUCLEAR MEDICINE GI BLEEDING SCAN 2/16/2020  FINDINGS: Normal activity is present in the blood pool and reticuloendothelial  system. There is no definite GI activity suggestive of active GI bleeding. IMPRESSION: No active GI bleeding demonstrated. CT abdomen and pelvis with IV contrast 2/16/20  FINDINGS: - Liver: Within normal limits. - Gallbladder and bile ducts: Within normal limits. - Spleen: Within normal limits. - Urinary tract: The previous 4.5 cm right kidney cyst has collapsed or been removed. There is a smaller approximate 1 cm cyst in the lower pole of the right kidney. The left kidney and the urinary bladder are unremarkable. No hydronephrosis is identified. - Adrenals: Left adrenal gland thickening is unchanged. The right adrenal gland remains within normal limits. - Pancreas: Within normal limits. - Gastrointestinal tract:  There is mild colonic diverticulosis, as well as a sigmoid colon suture anastomosis. There is no evidence of acute diverticulitis or bowel obstruction. The small bowel, rectum and appendix are normal. There is a small to moderate sized hiatal hernia, with adjacent surgical clips. - Retroperitoneum: Within normal limits. - Peritoneal cavity and abdominal wall: Within normal limits. - Pelvis: Within normal limits. - Spine/bones: No acute process. - Other comments: None. IMPRESSION: 1. No acute process. 2. Colonic diverticulosis and a sigmoid colon suture anastomosis. There is also a small to moderate sized hiatal hernia, with adjacent surgical clips. 3. Interval collapse or removal of the previous 4.5 cm right kidney cyst.     EGD 1/9/2013 for GERD revealed [place]    Colonoscopy 3/9/18 for screening revealed diverticulosis. Recall recommended for 10 years. Assessment:     Principal Problem:    GI bleed (2/15/2020)    Active Problems:    Diverticulosis (2/15/2020)    Patient is a 47 y.o. male with PMH of etoh abuse, anxiety, cardiomyopathy, diverticulitis s/p segmental colon resection, GERD, s/p Nissen fundoplication, HTN, who is here with lower GI bleeding consistent with diverticular bleed. HGB today 7.8 (yesterday 8.0 on 2/16 10.1). He does have hx of diverticulosis of colon. No abdominal pain. EGD 1/9/13 with hiatal hernia. Colonoscopy 3/9/18 with diverticulosis of colon. Repeat colonoscopy exam for screening due 3/2028. NM scan yesterday with no active bleed noted. Reports BRRB yesterday around noon. Plan:     - Continue to monitor Hgb and transfuse pRBC PRN for goal hgb of 8-9. Hgb today 7.8 and will transfuse 1U - pt feeling very weak  - Last BRRB around noon 2/16  - Avoid NSAIDS/heparin/lovenox  - Advance to clear liquid diet   - Tylenol ordered for EBONI Vital NP  Patient seen and examined in collaboration with Dr Hi Alas. Assessment and plan per Dr Lynne Bender.

## 2020-02-17 NOTE — PROGRESS NOTES
CM met with patient / spouse to complete intervention. Patient alert/orient to name, place and . Patient / spouse verified demographic information. Patient states that he lives wqith is spouse in a one level home with six steps to enter into the home. Patient states he's independent with his ADL's and has nom DME's  In the home. States he will be returning back home with no needs identified. CM will continue to monitor. Care Management Interventions  PCP Verified by CM: Yes(Fortino Maki MD)  Mode of Transport at Discharge:  Other (see comment)(Family)  Transition of Care Consult (CM Consult): Discharge Planning  Discharge Durable Medical Equipment: No  Physical Therapy Consult: No  Occupational Therapy Consult: No  Speech Therapy Consult: No  Current Support Network: Own Home, Lives with Spouse  Confirm Follow Up Transport: Family  Discharge Location  Discharge Placement: Home

## 2020-02-18 VITALS
OXYGEN SATURATION: 96 % | HEIGHT: 68 IN | TEMPERATURE: 98.1 F | WEIGHT: 194 LBS | RESPIRATION RATE: 16 BRPM | DIASTOLIC BLOOD PRESSURE: 66 MMHG | HEART RATE: 78 BPM | BODY MASS INDEX: 29.4 KG/M2 | SYSTOLIC BLOOD PRESSURE: 114 MMHG

## 2020-02-18 LAB
ABO + RH BLD: NORMAL
ANION GAP SERPL CALC-SCNC: 5 MMOL/L (ref 7–16)
BASOPHILS # BLD: 0 K/UL (ref 0–0.2)
BASOPHILS NFR BLD: 0 % (ref 0–2)
BLD PROD TYP BPU: NORMAL
BLOOD GROUP ANTIBODIES SERPL: NORMAL
BPU ID: NORMAL
BUN SERPL-MCNC: 6 MG/DL (ref 6–23)
CALCIUM SERPL-MCNC: 7.9 MG/DL (ref 8.3–10.4)
CHLORIDE SERPL-SCNC: 114 MMOL/L (ref 98–107)
CO2 SERPL-SCNC: 28 MMOL/L (ref 21–32)
CREAT SERPL-MCNC: 0.73 MG/DL (ref 0.8–1.5)
CROSSMATCH RESULT,%XM: NORMAL
DIFFERENTIAL METHOD BLD: ABNORMAL
EOSINOPHIL # BLD: 0.2 K/UL (ref 0–0.8)
EOSINOPHIL NFR BLD: 5 % (ref 0.5–7.8)
ERYTHROCYTE [DISTWIDTH] IN BLOOD BY AUTOMATED COUNT: 13 % (ref 11.9–14.6)
GLUCOSE SERPL-MCNC: 101 MG/DL (ref 65–100)
HCT VFR BLD AUTO: 27.8 % (ref 41.1–50.3)
HGB BLD-MCNC: 9.2 G/DL (ref 13.6–17.2)
IMM GRANULOCYTES # BLD AUTO: 0 K/UL (ref 0–0.5)
IMM GRANULOCYTES NFR BLD AUTO: 1 % (ref 0–5)
LYMPHOCYTES # BLD: 2 K/UL (ref 0.5–4.6)
LYMPHOCYTES NFR BLD: 46 % (ref 13–44)
MCH RBC QN AUTO: 32.3 PG (ref 26.1–32.9)
MCHC RBC AUTO-ENTMCNC: 33.1 G/DL (ref 31.4–35)
MCV RBC AUTO: 97.5 FL (ref 79.6–97.8)
MONOCYTES # BLD: 0.3 K/UL (ref 0.1–1.3)
MONOCYTES NFR BLD: 7 % (ref 4–12)
NEUTS SEG # BLD: 1.8 K/UL (ref 1.7–8.2)
NEUTS SEG NFR BLD: 42 % (ref 43–78)
NRBC # BLD: 0 K/UL (ref 0–0.2)
PLATELET # BLD AUTO: 161 K/UL (ref 150–450)
PMV BLD AUTO: 9.6 FL (ref 9.4–12.3)
POTASSIUM SERPL-SCNC: 3.7 MMOL/L (ref 3.5–5.1)
RBC # BLD AUTO: 2.85 M/UL (ref 4.23–5.6)
SODIUM SERPL-SCNC: 147 MMOL/L (ref 136–145)
SPECIMEN EXP DATE BLD: NORMAL
STATUS OF UNIT,%ST: NORMAL
UNIT DIVISION, %UDIV: 0
WBC # BLD AUTO: 4.4 K/UL (ref 4.3–11.1)

## 2020-02-18 PROCEDURE — 36415 COLL VENOUS BLD VENIPUNCTURE: CPT

## 2020-02-18 PROCEDURE — 80048 BASIC METABOLIC PNL TOTAL CA: CPT

## 2020-02-18 PROCEDURE — 74011000250 HC RX REV CODE- 250: Performed by: NURSE PRACTITIONER

## 2020-02-18 PROCEDURE — 74011250636 HC RX REV CODE- 250/636: Performed by: NURSE PRACTITIONER

## 2020-02-18 PROCEDURE — C9113 INJ PANTOPRAZOLE SODIUM, VIA: HCPCS | Performed by: NURSE PRACTITIONER

## 2020-02-18 PROCEDURE — 85025 COMPLETE CBC W/AUTO DIFF WBC: CPT

## 2020-02-18 RX ADMIN — SODIUM CHLORIDE, SODIUM LACTATE, POTASSIUM CHLORIDE, AND CALCIUM CHLORIDE 150 ML/HR: 600; 310; 30; 20 INJECTION, SOLUTION INTRAVENOUS at 08:57

## 2020-02-18 RX ADMIN — PANTOPRAZOLE SODIUM 40 MG: 40 INJECTION, POWDER, FOR SOLUTION INTRAVENOUS at 07:30

## 2020-02-18 RX ADMIN — SODIUM CHLORIDE, SODIUM LACTATE, POTASSIUM CHLORIDE, AND CALCIUM CHLORIDE 150 ML/HR: 600; 310; 30; 20 INJECTION, SOLUTION INTRAVENOUS at 02:35

## 2020-02-18 NOTE — PROGRESS NOTES
[address] 34 February 18, 2020       RE: Papi Lee      To Whom It May Concern,    This is to certify that Papi Lee was admitted to [facility] on 2/15/2020 until 2/18/2020. Please excuse Xander Hinojosa as she has been by his side during this time. Please feel free to contact my office if you have any questions or concerns. Thank you for your assistance in this matter.       Sincerely,  Schuyler Card, [address] [facility]   2nd floor  [phone]

## 2020-02-18 NOTE — DISCHARGE INSTRUCTIONS
Disposition: home    Activity: Activity as tolerated  Diet: DIET GI SOFT No options chosen     Soft-Textured, [place] Diet: Care Instructions  Your Care Instructions    A soft-textured, bland diet is used when you need food that is easy to chew, swallow, and digest. You will need to choose soft foods that are low in spices and seasonings. You will need to avoid high-fat foods, as well as caffeine and alcohol. Your doctor or dietitian can help you plan a soft-textured, bland diet based on your health and what you prefer to eat. Ask your doctor how long you should stay on this diet. As you get better, you will probably be able to go back to a regular diet. Talk with your doctor or dietitian before you make changes in your diet. Follow-up care is a key part of your treatment and safety. Be sure to make and go to all appointments, and call your doctor if you are having problems. It's also a good idea to know your test results and keep a list of the medicines you take. How can you care for yourself at home? · Choose foods that are easy to chew and swallow. Good choices are mashed potatoes, soft breads and rolls, cream soups, oatmeal, and Cream of Wheat. · Choose soft, well-cooked vegetables and soft or canned fruits. Good choices are applesauce, ripe bananas, and non-citrus fruit juice. · Try milk, yogurt, or other milk products, if you can digest dairy without too many problems. Your doctor may limit milk and milk products for a while. If so, he or she may recommend a calcium and vitamin D supplement. · Choose soft protein foods such as eggs, tofu, steamed fish, chicken, and turkey. Slow-cooking methods, such as stewing, will help soften meat. Chopping meat in a  or  also will make it easier to eat. · Avoid nuts, raw vegetables, hard crackers, tough meats, and prunes and prune juice.   · Avoid foods that are very spicy, such as foods seasoned with black pepper, chili peppers, horseradish, or hot sauce. · Avoid highly acidic foods such as citrus fruits, citrus fruit juices, and tomato-based foods. · Avoid high-fat foods such as fried meat, chips, and rich desserts. · Check with your doctor before you drink alcohol or beverages that have caffeine, such as coffee, tea, and cola beverages. Where can you learn more? Go to http://ezequiel-geovany.info/. Enter E468 in the search box to learn more about \"Soft-Textured, Anita Laundry Diet: Care Instructions. \"  Current as of: November 7, 2018  Content Version: 12.2  © 8888-5979 [employer]. Care instructions adapted under license by [employer] (which disclaims liability or warranty for this information). If you have questions about a medical condition or this instruction, always ask your healthcare professional. [address] any warranty or liability for your use of this information. Gastrointestinal Bleeding: Care Instructions  Your Care Instructions    The digestive or gastrointestinal tract goes from the mouth to the anus. It is often called the GI tract. Bleeding can happen anywhere in the GI tract. It may be caused by an ulcer, an infection, or cancer. It may also be caused by medicines such as aspirin or ibuprofen. Light bleeding may not cause any symptoms at first. But if you continue to bleed for a while, you may feel very weak or tired. Sudden, heavy bleeding means you need to see a doctor right away. This kind of bleeding can be very dangerous. But it can usually be cured or controlled. The doctor may do some tests to find the cause of your bleeding. Follow-up care is a key part of your treatment and safety. Be sure to make and go to all appointments, and call your doctor if you are having problems. It's also a good idea to know your test results and keep a list of the medicines you take. How can you care for yourself at home? · Be safe with medicines.  Take your medicines exactly as prescribed. Call your doctor if you think you are having a problem with your medicine. You will get more details on the specific medicines your doctor prescribes. · Do not take aspirin or other anti-inflammatory medicines, such as naproxen (Aleve) or ibuprofen (Advil, Motrin), without talking to your doctor first. Ask your doctor if it is okay to use acetaminophen (Tylenol). · Do not drink alcohol. · The bleeding may make you lose iron. So it's important to eat foods that have a lot of iron. These include red meat, shellfish, poultry, and eggs. They also include beans, raisins, whole-grain breads, and leafy green vegetables. If you want help planning meals, you can make an appointment with a dietitian. When should you call for help? Call 911 anytime you think you may need emergency care. For example, call if:    · You have sudden, severe belly pain.     · You vomit blood or what looks like coffee grounds.     · You passed out (lost consciousness).     · Your stools are maroon or very bloody.    Call your doctor now or seek immediate medical care if:    · You are dizzy or lightheaded, or you feel like you may faint.     · Your stools are black and look like tar, or they have streaks of blood.     · You have belly pain.     · You vomit or have nausea.     · You have trouble swallowing, or it hurts when you swallow.    Watch closely for changes in your health, and be sure to contact your doctor if:    · You do not get better as expected. Where can you learn more? Go to http://ezequiel-geovany.info/. Enter K890 in the search box to learn more about \"Gastrointestinal Bleeding: Care Instructions. \"  Current as of: June 26, 2019  Content Version: 12.2  © 4119-3265 [employer]. Care instructions adapted under license by [employer] (which disclaims liability or warranty for this information).  If you have questions about a medical condition or this instruction, always ask your healthcare professional. [address] any warranty or liability for your use of this information. DISCHARGE SUMMARY from Nurse    PATIENT INSTRUCTIONS:    After general anesthesia or intravenous sedation, for 24 hours or while taking prescription Narcotics:  · Limit your activities  · Do not drive and operate hazardous machinery  · Do not make important personal or business decisions  · Do  not drink alcoholic beverages  · If you have not urinated within 8 hours after discharge, please contact your surgeon on call. Report the following to your surgeon:  · Excessive pain, swelling, redness or odor of or around the surgical area  · Temperature over 100.5  · Nausea and vomiting lasting longer than 4 hours or if unable to take medications  · Any signs of decreased circulation or nerve impairment to extremity: change in color, persistent  numbness, tingling, coldness or increase pain  · Any questions    What to do at Home:  Recommended activity: Activity as tolerated. If you experience any of the following symptoms:  Black, bloody or tarry stools,  Uncontrolled pain or nausea/ vomiting,  please follow up with your doctor. *  Please give a list of your current medications to your Primary Care Provider. *  Please update this list whenever your medications are discontinued, doses are      changed, or new medications (including over-the-counter products) are added. *  Please carry medication information at all times in case of emergency situations. These are general instructions for a healthy lifestyle:    No smoking/ No tobacco products/ Avoid exposure to second hand smoke  Surgeon General's Warning:  Quitting smoking now greatly reduces serious risk to your health.     Obesity, smoking, and sedentary lifestyle greatly increases your risk for illness    A healthy diet, regular physical exercise & weight monitoring are important for maintaining a healthy lifestyle    You may be retaining fluid if you have a history of heart failure or if you experience any of the following symptoms:  Weight gain of 3 pounds or more overnight or 5 pounds in a week, increased swelling in our hands or feet or shortness of breath while lying flat in bed. Please call your doctor as soon as you notice any of these symptoms; do not wait until your next office visit. The discharge information has been reviewed with the patient. The patient verbalized understanding. Discharge medications reviewed with the patient and appropriate educational materials and side effects teaching were provided.   ___________________________________________________________________________________________________________________________________

## 2020-02-18 NOTE — PROGRESS NOTES
[address] February 18, 2020       RE: Meli Laws      To Whom It May Concern,    This is to certify that Meli Laws was admitted to [facility] on 2/15/2020 until 2/18/2020. He may return to work 2/24/2020 per Dr Ladarius Mitchell. Please feel free to contact my office if you have any questions or concerns. Thank you for your assistance in this matter.       Sincerely,  June Amen, [address]  [phone]

## 2020-02-18 NOTE — PROGRESS NOTES
GI DAILY PROGRESS NOTE    Admit Date:  2/15/2020    Today's Date:  2/18/2020    CC:  Lower GI Bleed    Subjective:     Pt reports brown stool last night and loose brown this am.  No more BRRB. Feels good and ready to go home. Tolerated reg diet this am.    Medications:   Current Facility-Administered Medications   Medication Dose Route Frequency    acetaminophen (TYLENOL) tablet 500 mg  500 mg Oral Q4H PRN    0.9% sodium chloride infusion 250 mL  250 mL IntraVENous PRN    pantoprazole (PROTONIX) 40 mg in 0.9% sodium chloride 10 mL injection  40 mg IntraVENous Q12H    lactated Ringers infusion  150 mL/hr IntraVENous CONTINUOUS    LORazepam (ATIVAN) tablet 0.5 mg  0.5 mg Oral Q4H PRN    sodium chloride (NS) flush 5-40 mL  5-40 mL IntraVENous Q8H    sodium chloride (NS) flush 5-40 mL  5-40 mL IntraVENous PRN    influenza vaccine 2019-20 (6 mos+)(PF) (FLUARIX/FLULAVAL/FLUZONE QUAD) injection 0.5 mL  0.5 mL IntraMUSCular PRIOR TO DISCHARGE    diphenhydrAMINE (BENADRYL) capsule 25 mg  25 mg Oral QHS PRN       Review of Systems:  ROS was obtained, with pertinent positives as listed above. No chest pain or SOB. Diet:  GI Soft    Objective:   Vitals:  Visit Vitals  /69 (BP 1 Location: Left arm, BP Patient Position: At rest)   Pulse 84   Temp 98.2 °F (36.8 °C)   Resp 17   Ht 5' 8\" (1.727 m)   Wt 88 kg (194 lb)   SpO2 97%   BMI 29.50 kg/m²     Intake/Output:  No intake/output data recorded.   02/16 1901 - 02/18 0700  In: 6774.5 [I.V.:6442]  Out: 725 [Urine:725]  Exam:  General appearance: NAD  HEENT: anicteric sclera, mmm  Lungs: clear to auscultation bilaterally anteriorly  Heart: regular rate and rhythm  Abdomen: soft, NT, ND, +BS  Neuro:  alert and oriented x3    Data Review (Labs):    Recent Labs     02/18/20  0507 02/17/20  2314 02/17/20  1742 02/17/20  0518 02/17/20  0140 02/16/20  2238 02/16/20  1603 02/16/20  1051 02/16/20  0451 02/15/20  1938 02/15/20  1207   WBC 4.4  --   --   --  4.8  --   -- --  3.3*  --   --    HGB 9.2* 8.4* 8.5* 7.8* 7.8* 8.0* 8.5* 10.1* 9.4* 8.8* 9.8*   HCT 27.8*  --   --   --  23.4*  --   --   --  29.7*  --   --      --   --   --  147*  --   --   --  148*  --   --    MCV 97.5  --   --   --  97.1  --   --   --  100.3*  --   --    *  --   --   --  144  --   --   --  146*  --   --    K 3.7  --   --   --  3.2*  --   --   --  3.9  --   --    *  --   --   --  113*  --   --   --  115*  --   --    CO2 28  --   --   --  26  --   --   --  28  --   --    BUN 6  --   --   --  12  --   --   --  10  --   --    CREA 0.73*  --   --   --  0.61*  --   --   --  0.78*  --   --    CA 7.9*  --   --   --  7.7*  --   --   --  7.7*  --   --    MG  --   --   --  2.0  --   --   --   --   --   --   --    *  --   --   --  99  --   --   --  107*  --   --      NUCLEAR MEDICINE GI BLEEDING SCAN 2/16/2020  FINDINGS: Normal activity is present in the blood pool and reticuloendothelial  system. There is no definite GI activity suggestive of active GI bleeding. IMPRESSION: No active GI bleeding demonstrated. CT abdomen and pelvis with IV contrast 2/16/20  FINDINGS: - Liver: Within normal limits. - Gallbladder and bile ducts: Within normal limits. - Spleen: Within normal limits. - Urinary tract: The previous 4.5 cm right kidney cyst has collapsed or been removed. There is a smaller approximate 1 cm cyst in the lower pole of the right kidney. The left kidney and the urinary bladder are unremarkable. No hydronephrosis is identified. - Adrenals: Left adrenal gland thickening is unchanged. The right adrenal gland remains within normal limits. - Pancreas: Within normal limits. - Gastrointestinal tract: There is mild colonic diverticulosis, as well as a sigmoid colon suture anastomosis. There is no evidence of acute diverticulitis or bowel obstruction. The small bowel, rectum and appendix are normal. There is a small to moderate sized hiatal hernia, with adjacent surgical clips.  - Retroperitoneum: Within normal limits. - Peritoneal cavity and abdominal wall: Within normal limits. - Pelvis: Within normal limits. - Spine/bones: No acute process. - Other comments: None. IMPRESSION: 1. No acute process. 2. Colonic diverticulosis and a sigmoid colon suture anastomosis. There is also a small to moderate sized hiatal hernia, with adjacent surgical clips. 3. Interval collapse or removal of the previous 4.5 cm right kidney cyst.     EGD 1/9/2013 for GERD revealed [facility]    Colonoscopy 3/9/18 for screening revealed diverticulosis. Recall recommended for 10 years. Assessment:     Principal Problem:    GI bleed (2/15/2020)    Active Problems:    Diverticulosis (2/15/2020)    Patient is a 47 y.o. male with PMH of etoh abuse, anxiety, cardiomyopathy, diverticulitis s/p segmental colon resection, GERD, s/p Nissen fundoplication, HTN, who is here with lower GI bleeding consistent with diverticular bleed. HGB today 7.8 (yesterday 8.0 on 2/16 10.1). He does have hx of diverticulosis of colon. No abdominal pain. EGD 1/9/13 with hiatal hernia. Colonoscopy 3/9/18 with diverticulosis of colon. Repeat colonoscopy exam for screening due 3/2028. NM scan yesterday with no active bleed noted. Reports no more BRRB with brown stool. Plan:     -No more clinical bleed and hgb stable at 9.2  - OK to D/C - GI will FU out patient - please call with any questions or concerns. Kelley Ga NP  Patient seen and examined in collaboration with Dr Hieu Milian. Assessment and plan per Dr Arpan Goff.

## 2020-02-18 NOTE — DISCHARGE SUMMARY
Hospitalist Discharge Summary     Admit Date:  2/15/2020 12:09 AM   Name:  Trent Montague   Age:  47 y.o.  :  1965   MRN:  [id number]   PCP:  Kay Garrison NP  Treatment Team: Attending Provider: Benji Leyva MD; Consulting Provider: Jes Corona MD; Utilization Review: Tiffanie Jacobson RN    Problem List for this Hospitalization:  Hospital Problems as of 2020 Date Reviewed: 2/15/2020          Codes Class Noted - Resolved POA    * (Principal) GI bleed ICD-10-CM: K92.2  ICD-9-CM: 578.9  2/15/2020 - Present Unknown        Diverticulosis (Chronic) ICD-10-CM: K57.90  ICD-9-CM: 562.10  2/15/2020 - Present Unknown                Admission HPI from 2/15/2020:    \"54 y.o  male w/ PMH of Diverticulosis s/p partial colectomy and GERD s/p Nissen fundoplication admitted  QGA rectal bleed. Stated 4 day onset of dark stools then stated 4 large bright red stools . Last colonoscopy 1 year ago showing diverticulosis.    No complaints of N/V/D. Hemoglobin drop drom 12.6 to 9.4 now. Gi consulted and will provide supportive care at this time.     [facility] Course:  He was admitted to general medical floor for GI bleed. Gastroenterology was consulted. He had a rapid response on  for bright red blood per rectum and a syncopal episode. Serial hemoglobins were checked and was noted to be at 7.8.  1 unit PRBCs was given. Diet was advanced as tolerated. He did not require colonoscopy. His bleeding resolved. He felt back to baseline on the day of discharge. He is to follow-up with gastroenterology. They will call to schedule appointment. He is hemodynamically stable for discharge to home. A work note will be given. He can return to work on     Follow up instructions below. Plan was discussed with patient and wife. All questions answered.   Patient was stable at time of discharge and was instructed to call or return if there are any concerns or recurrence of symptoms. Diagnostic Imaging/Tests:   Nm Acute Gi Bleed Scan    Result Date: 2/17/2020  NUCLEAR MEDICINE GI BLEEDING SCAN 2/16/2020 HISTORY: GI bleed. Rectal bleeding. Diverticulosis status post partial colectomy. TECHNIQUE: 22.5 mCi technetium 99m pertechnetate tagged autologous red blood cells was administered intravenously and imaging of the abdomen was performed. FINDINGS: Normal activity is present in the blood pool and reticuloendothelial system. There is no definite GI activity suggestive of active GI bleeding. IMPRESSION: No active GI bleeding demonstrated. Ct Head Wo Cont    Result Date: 2/16/2020  HEAD CT WITHOUT CONTRAST  2/16/2020 HISTORY:   confusion patient reports subjectively  ; slurred speech TECHNIQUE: Noncontrast axial images were obtained through the brain. All CT scans at this facility used dose modulation, interactive reconstruction and/or weight based dosing when appropriate to reduce radiation dose to as low as reasonably achievable. COMPARISON: None FINDINGS: There is no acute intracranial hemorrhage, significant mass effect or CT evidence of acute large artery territorial infarction. Please note that a hyperacute infarct or small vessel infarct may not be apparent on initial CT imaging. There is no hydrocephalus , intra-axial mass or abnormal extra-axial fluid collection. There are no displaced skull fractures. The mastoid air cells and paranasal sinuses are clear where imaged. IMPRESSION: No acute findings. Ct Abd Pelv W Cont    Result Date: 2/15/2020  EXAM: CT abdomen and pelvis with IV contrast. INDICATION: Rectal bleeding. COMPARISON: Prior CT abdomen and pelvis on July 24, 2016. TECHNIQUE: Axial CT images of the abdomen and pelvis were obtained after the intravenous injection of 100 mL Isovue 370 CT contrast. Radiation dose reduction techniques were used for this study.   Our CT scanners use one or all of the following:  Automated exposure control, adjustment of the mA or kV according to patient size, iterative reconstruction. FINDINGS: - Liver: Within normal limits. - Gallbladder and bile ducts: Within normal limits. - Spleen: Within normal limits. - Urinary tract: The previous 4.5 cm right kidney cyst has collapsed or been removed. There is a smaller approximate 1 cm cyst in the lower pole of the right kidney. The left kidney and the urinary bladder are unremarkable. No hydronephrosis is identified. - Adrenals: Left adrenal gland thickening is unchanged. The right adrenal gland remains within normal limits. - Pancreas: Within normal limits. - Gastrointestinal tract: There is mild colonic diverticulosis, as well as a sigmoid colon suture anastomosis. There is no evidence of acute diverticulitis or bowel obstruction. The small bowel, rectum and appendix are normal. There is a small to moderate sized hiatal hernia, with adjacent surgical clips. - Retroperitoneum: Within normal limits. - Peritoneal cavity and abdominal wall: Within normal limits. - Pelvis: Within normal limits. - Spine/bones: No acute process. - Other comments: None. IMPRESSION: 1. No acute process. 2. Colonic diverticulosis and a sigmoid colon suture anastomosis. There is also a small to moderate sized hiatal hernia, with adjacent surgical clips. 3. Interval collapse or removal of the previous 4.5 cm right kidney cyst.       Echocardiogram results:  No results found for this visit on 02/15/20.       All Micro Results     None          Labs: Results:       BMP, Mg, Phos Recent Labs     02/18/20  0507 02/17/20  0518 02/17/20  0140 02/16/20  0451   *  --  144 146*   K 3.7  --  3.2* 3.9   *  --  113* 115*   CO2 28  --  26 28   AGAP 5*  --  5* 3*   BUN 6  --  12 10   CREA 0.73*  --  0.61* 0.78*   CA 7.9*  --  7.7* 7.7*   *  --  99 107*   MG  --  2.0  --   --       CBC Recent Labs     02/18/20  0507 02/17/20  2314 02/17/20  1742  02/17/20  0140  02/16/20  0451   WBC 4.4  --   -- --  4.8  --  3.3*   RBC 2.85*  --   --   --  2.41*  --  2.96*   HGB 9.2* 8.4* 8.5*   < > 7.8*   < > 9.4*   HCT 27.8*  --   --   --  23.4*  --  29.7*     --   --   --  147*  --  148*   GRANS 42*  --   --   --  49  --  40*   LYMPH 46*  --   --   --  43  --  48*   EOS 5  --   --   --  2  --  4   MONOS 7  --   --   --  6  --  7   BASOS 0  --   --   --  0  --  0   IG 1  --   --   --  0  --  0   ANEU 1.8  --   --   --  2.3  --  1.3*   ABL 2.0  --   --   --  2.1  --  1.6   MIKE 0.2  --   --   --  0.1  --  0.1   ABM 0.3  --   --   --  0.3  --  0.2   ABB 0.0  --   --   --  0.0  --  0.0   AIG 0.0  --   --   --  0.0  --  0.0    < > = values in this interval not displayed. LFT No results for input(s): SGOT, ALT, TBIL, AP, TP, ALB, GLOB, AGRAT, GPT in the last 72 hours.    Cardiac Testing Lab Results   Component Value Date/Time    BNP 14 02/22/2012 04:19 PM      Coagulation Tests Lab Results   Component Value Date/Time    Prothrombin time 13.0 02/15/2020 12:14 AM    Prothrombin time 9.6 02/10/2017 04:20 PM    INR 1.0 02/15/2020 12:14 AM    INR 0.9 02/10/2017 04:20 PM    aPTT 25.0 02/15/2020 12:14 AM    aPTT 23.8 02/10/2017 04:20 PM      A1c No results found for: HBA1C, HGBE8, RJA8YNZJ, TBB9BZFM   Lipid Panel Lab Results   Component Value Date/Time    Cholesterol, total 193 10/04/2019 08:22 AM    HDL Cholesterol 52 10/04/2019 08:22 AM    LDL, calculated 127 (H) 10/04/2019 08:22 AM    VLDL, calculated 14 10/04/2019 08:22 AM    Triglyceride 72 10/04/2019 08:22 AM      Thyroid Panel Lab Results   Component Value Date/Time    T4, Total 8.5 02/22/2012 04:19 PM    TSH 1.420 10/04/2019 08:22 AM    TSH 2.140 10/03/2018 07:43 AM        Most Recent UA Lab Results   Component Value Date/Time    Color YELLOW 04/22/2008 02:45 PM    Appearance CLEAR 04/22/2008 02:45 PM    pH (UA) 7.0 04/22/2008 02:45 PM    Protein NEGATIVE  04/22/2008 02:45 PM    Glucose NEGATIVE  04/22/2008 02:45 PM    Ketone 40 (A) 04/22/2008 02:45 PM Bilirubin NEGATIVE  04/22/2008 02:45 PM    Blood NEGATIVE  04/22/2008 02:45 PM    Urobilinogen 0.2 04/22/2008 02:45 PM    Nitrites NEGATIVE  04/22/2008 02:45 PM    Leukocyte Esterase NEGATIVE  04/22/2008 02:45 PM        Allergies   Allergen Reactions    Pcn [Penicillins] Hives     Immunization History   Administered Date(s) Administered    Tdap 11/10/2016       All Labs from Last 24 Hrs:  Recent Results (from the past 24 hour(s))   HEMOGLOBIN    Collection Time: 02/17/20  5:42 PM   Result Value Ref Range    HGB 8.5 (L) 13.6 - 17.2 g/dL   HEMOGLOBIN    Collection Time: 02/17/20 11:14 PM   Result Value Ref Range    HGB 8.4 (L) 13.6 - 17.2 g/dL   CBC WITH AUTOMATED DIFF    Collection Time: 02/18/20  5:07 AM   Result Value Ref Range    WBC 4.4 4.3 - 11.1 K/uL    RBC 2.85 (L) 4.23 - 5.6 M/uL    HGB 9.2 (L) 13.6 - 17.2 g/dL    HCT 27.8 (L) 41.1 - 50.3 %    MCV 97.5 79.6 - 97.8 FL    MCH 32.3 26.1 - 32.9 PG    MCHC 33.1 31.4 - 35.0 g/dL    RDW 13.0 11.9 - 14.6 %    PLATELET 003 090 - 851 K/uL    MPV 9.6 9.4 - 12.3 FL    ABSOLUTE NRBC 0.00 0.0 - 0.2 K/uL    DF AUTOMATED      NEUTROPHILS 42 (L) 43 - 78 %    LYMPHOCYTES 46 (H) 13 - 44 %    MONOCYTES 7 4.0 - 12.0 %    EOSINOPHILS 5 0.5 - 7.8 %    BASOPHILS 0 0.0 - 2.0 %    IMMATURE GRANULOCYTES 1 0.0 - 5.0 %    ABS. NEUTROPHILS 1.8 1.7 - 8.2 K/UL    ABS. LYMPHOCYTES 2.0 0.5 - 4.6 K/UL    ABS. MONOCYTES 0.3 0.1 - 1.3 K/UL    ABS. EOSINOPHILS 0.2 0.0 - 0.8 K/UL    ABS. BASOPHILS 0.0 0.0 - 0.2 K/UL    ABS. IMM.  GRANS. 0.0 0.0 - 0.5 K/UL   METABOLIC PANEL, BASIC    Collection Time: 02/18/20  5:07 AM   Result Value Ref Range    Sodium 147 (H) 136 - 145 mmol/L    Potassium 3.7 3.5 - 5.1 mmol/L    Chloride 114 (H) 98 - 107 mmol/L    CO2 28 21 - 32 mmol/L    Anion gap 5 (L) 7 - 16 mmol/L    Glucose 101 (H) 65 - 100 mg/dL    BUN 6 6 - 23 MG/DL    Creatinine 0.73 (L) 0.8 - 1.5 MG/DL    GFR est AA >60 >60 ml/min/1.73m2    GFR est non-AA >60 >60 ml/min/1.73m2    Calcium 7.9 (L) 8.3 - 10.4 MG/DL       Discharge Exam:  Patient Vitals for the past 24 hrs:   Temp Pulse Resp BP SpO2   02/18/20 1107 98.1 °F (36.7 °C) 78 16 114/66 96 %   02/18/20 0715 98.2 °F (36.8 °C) 84 17 104/69 97 %   02/18/20 0346 97.6 °F (36.4 °C) (!) 117 18 104/51 98 %   02/17/20 2313 97.8 °F (36.6 °C) 81 18 101/62 97 %   02/17/20 2048 98.2 °F (36.8 °C) 76 16 108/64 96 %   02/17/20 1500 97.5 °F (36.4 °C) 73 16 117/66 100 %   02/17/20 1230 97.5 °F (36.4 °C) 75 16 109/68 98 %   02/17/20 1214 98.3 °F (36.8 °C) 78 16 104/72 98 %     Oxygen Therapy  O2 Sat (%): 96 % (02/18/20 1107)  Pulse via Oximetry: 71 beats per minute (02/15/20 0359)  O2 Device: Nasal cannula (02/16/20 1930)  O2 Flow Rate (L/min): 2 l/min (02/16/20 1112)    Intake/Output Summary (Last 24 hours) at 2/18/2020 1149  Last data filed at 2/18/2020 0353  Gross per 24 hour   Intake 3681.5 ml   Output    Net 3681.5 ml       General:    Well nourished. Alert. No distress. Eyes:   Normal sclera. Extraocular movements intact. ENT:  Normocephalic, atraumatic. Moist mucous membranes  CV:   Regular rate and rhythm. No murmur, rub, or gallop. Lungs:  Clear to auscultation bilaterally. No wheezing, rhonchi, or rales. Abdomen: Soft, nontender, nondistended. Bowel sounds normal.   Extremities: Warm and dry. No cyanosis or edema. Neurologic: CN II-XII grossly intact. Sensation intact. Skin:     No rashes or jaundice. Psych:  Normal mood and affect. Discharge Info:   Current Discharge Medication List      CONTINUE these medications which have NOT CHANGED    Details   cyanocobalamin 1,000 mcg tablet Take 1,000 mcg by mouth daily. multivitamin (ONE A DAY) tablet Take 1 Tab by mouth every morning. ascorbic acid (VITAMIN C) 1,000 mg tablet Take 1,000 mg by mouth every morning.            STOP taking these medications       ibuprofen (MOTRIN) 800 mg tablet Comments:   Reason for Stopping:                 Disposition: home    Activity: Activity as tolerated  Diet: DIET GI SOFT No options chosen    Follow-up Appointments   Procedures    FOLLOW UP VISIT Appointment in: 3 - 5 Days PCP early this week for CBC recheck GI in 1-2 weeks     PCP early this week for CBC recheck  GI in 1-2 weeks     Standing Status:   Standing     Number of Occurrences:   1     Order Specific Question:   Appointment in     Answer:   3 - 5 Days         Follow-up Information     Follow up With Specialties Details Why Contact Info    Farhana Padron MD Gastroenterology  OFFICE WILL CALL WITH APPOINTMENT [address] [facility] [address] [place] [address] Rd  [phone]      Virgie Koroma NP Nurse Practitioner In 2 weeks Hospital follow up for GI bleed. Recommend Hgb check. Discharge Hgb was 9.2 [address]            Time spent in patient discharge planning and coordination 35 minutes.     Signed:  Dusty Samaniego MD

## 2020-02-20 ENCOUNTER — HOSPITAL ENCOUNTER (INPATIENT)
Age: 55
LOS: 4 days | Discharge: HOME | DRG: 378 | End: 2020-02-24
Attending: EMERGENCY MEDICINE | Admitting: INTERNAL MEDICINE
Payer: COMMERCIAL

## 2020-02-20 DIAGNOSIS — D62 ACUTE BLOOD LOSS ANEMIA: ICD-10-CM

## 2020-02-20 DIAGNOSIS — Z87.19 HISTORY OF DIVERTICULOSIS: ICD-10-CM

## 2020-02-20 DIAGNOSIS — K62.5 RECTAL BLEEDING: Primary | ICD-10-CM

## 2020-02-20 LAB
ALBUMIN SERPL-MCNC: 3 G/DL (ref 3.5–5)
ALBUMIN/GLOB SERPL: 1.1 {RATIO} (ref 1.2–3.5)
ALP SERPL-CCNC: 32 U/L (ref 50–136)
ALT SERPL-CCNC: 29 U/L (ref 12–65)
ANION GAP SERPL CALC-SCNC: 7 MMOL/L (ref 7–16)
APTT PPP: 25.9 SEC (ref 24.3–35.4)
AST SERPL-CCNC: 19 U/L (ref 15–37)
BASOPHILS # BLD: 0 K/UL (ref 0–0.2)
BASOPHILS NFR BLD: 0 % (ref 0–2)
BILIRUB SERPL-MCNC: 0.2 MG/DL (ref 0.2–1.1)
BUN SERPL-MCNC: 13 MG/DL (ref 6–23)
CALCIUM SERPL-MCNC: 7.9 MG/DL (ref 8.3–10.4)
CHLORIDE SERPL-SCNC: 110 MMOL/L (ref 98–107)
CO2 SERPL-SCNC: 26 MMOL/L (ref 21–32)
CREAT SERPL-MCNC: 0.76 MG/DL (ref 0.8–1.5)
DIFFERENTIAL METHOD BLD: ABNORMAL
EOSINOPHIL # BLD: 0.2 K/UL (ref 0–0.8)
EOSINOPHIL NFR BLD: 4 % (ref 0.5–7.8)
ERYTHROCYTE [DISTWIDTH] IN BLOOD BY AUTOMATED COUNT: 13.5 % (ref 11.9–14.6)
GLOBULIN SER CALC-MCNC: 2.7 G/DL (ref 2.3–3.5)
GLUCOSE SERPL-MCNC: 135 MG/DL (ref 65–100)
HCT VFR BLD AUTO: 27 % (ref 41.1–50.3)
HGB BLD-MCNC: 8.9 G/DL (ref 13.6–17.2)
IMM GRANULOCYTES # BLD AUTO: 0 K/UL (ref 0–0.5)
IMM GRANULOCYTES NFR BLD AUTO: 0 % (ref 0–5)
INR PPP: 0.9
LYMPHOCYTES # BLD: 1.8 K/UL (ref 0.5–4.6)
LYMPHOCYTES NFR BLD: 37 % (ref 13–44)
MCH RBC QN AUTO: 32.6 PG (ref 26.1–32.9)
MCHC RBC AUTO-ENTMCNC: 33 G/DL (ref 31.4–35)
MCV RBC AUTO: 98.9 FL (ref 79.6–97.8)
MONOCYTES # BLD: 0.3 K/UL (ref 0.1–1.3)
MONOCYTES NFR BLD: 6 % (ref 4–12)
NEUTS SEG # BLD: 2.6 K/UL (ref 1.7–8.2)
NEUTS SEG NFR BLD: 52 % (ref 43–78)
NRBC # BLD: 0 K/UL (ref 0–0.2)
PLATELET # BLD AUTO: 205 K/UL (ref 150–450)
PMV BLD AUTO: 9.6 FL (ref 9.4–12.3)
POTASSIUM SERPL-SCNC: 3.4 MMOL/L (ref 3.5–5.1)
PROT SERPL-MCNC: 5.7 G/DL (ref 6.3–8.2)
PROTHROMBIN TIME: 12.4 SEC (ref 12–14.7)
RBC # BLD AUTO: 2.73 M/UL (ref 4.23–5.6)
SODIUM SERPL-SCNC: 143 MMOL/L (ref 136–145)
WBC # BLD AUTO: 4.9 K/UL (ref 4.3–11.1)

## 2020-02-20 PROCEDURE — 74011250636 HC RX REV CODE- 250/636: Performed by: EMERGENCY MEDICINE

## 2020-02-20 PROCEDURE — 96376 TX/PRO/DX INJ SAME DRUG ADON: CPT

## 2020-02-20 PROCEDURE — 74011000250 HC RX REV CODE- 250: Performed by: INTERNAL MEDICINE

## 2020-02-20 PROCEDURE — 85610 PROTHROMBIN TIME: CPT

## 2020-02-20 PROCEDURE — 96374 THER/PROPH/DIAG INJ IV PUSH: CPT

## 2020-02-20 PROCEDURE — 86900 BLOOD TYPING SEROLOGIC ABO: CPT

## 2020-02-20 PROCEDURE — 85730 THROMBOPLASTIN TIME PARTIAL: CPT

## 2020-02-20 PROCEDURE — 30233N1 TRANSFUSION OF NONAUTOLOGOUS RED BLOOD CELLS INTO PERIPHERAL VEIN, PERCUTANEOUS APPROACH: ICD-10-PCS | Performed by: FAMILY MEDICINE

## 2020-02-20 PROCEDURE — 65660000000 HC RM CCU STEPDOWN

## 2020-02-20 PROCEDURE — 80053 COMPREHEN METABOLIC PANEL: CPT

## 2020-02-20 PROCEDURE — 85025 COMPLETE CBC W/AUTO DIFF WBC: CPT

## 2020-02-20 PROCEDURE — 93005 ELECTROCARDIOGRAM TRACING: CPT | Performed by: EMERGENCY MEDICINE

## 2020-02-20 PROCEDURE — 74011250636 HC RX REV CODE- 250/636: Performed by: INTERNAL MEDICINE

## 2020-02-20 PROCEDURE — 36430 TRANSFUSION BLD/BLD COMPNT: CPT

## 2020-02-20 PROCEDURE — C9113 INJ PANTOPRAZOLE SODIUM, VIA: HCPCS | Performed by: INTERNAL MEDICINE

## 2020-02-20 PROCEDURE — 86923 COMPATIBILITY TEST ELECTRIC: CPT

## 2020-02-20 PROCEDURE — 96361 HYDRATE IV INFUSION ADD-ON: CPT

## 2020-02-20 PROCEDURE — 74011000250 HC RX REV CODE- 250: Performed by: EMERGENCY MEDICINE

## 2020-02-20 PROCEDURE — C9113 INJ PANTOPRAZOLE SODIUM, VIA: HCPCS | Performed by: EMERGENCY MEDICINE

## 2020-02-20 PROCEDURE — 99284 EMERGENCY DEPT VISIT MOD MDM: CPT

## 2020-02-20 PROCEDURE — P9016 RBC LEUKOCYTES REDUCED: HCPCS

## 2020-02-20 RX ORDER — SODIUM CHLORIDE, SODIUM LACTATE, POTASSIUM CHLORIDE, CALCIUM CHLORIDE 600; 310; 30; 20 MG/100ML; MG/100ML; MG/100ML; MG/100ML
125 INJECTION, SOLUTION INTRAVENOUS CONTINUOUS
Status: DISCONTINUED | OUTPATIENT
Start: 2020-02-20 | End: 2020-02-21

## 2020-02-20 RX ORDER — SODIUM CHLORIDE 0.9 % (FLUSH) 0.9 %
5-40 SYRINGE (ML) INJECTION AS NEEDED
Status: DISCONTINUED | OUTPATIENT
Start: 2020-02-20 | End: 2020-02-24 | Stop reason: HOSPADM

## 2020-02-20 RX ORDER — SODIUM CHLORIDE 9 MG/ML
250 INJECTION, SOLUTION INTRAVENOUS AS NEEDED
Status: DISCONTINUED | OUTPATIENT
Start: 2020-02-20 | End: 2020-02-23

## 2020-02-20 RX ORDER — SODIUM CHLORIDE 0.9 % (FLUSH) 0.9 %
5-40 SYRINGE (ML) INJECTION EVERY 8 HOURS
Status: DISCONTINUED | OUTPATIENT
Start: 2020-02-20 | End: 2020-02-24 | Stop reason: HOSPADM

## 2020-02-20 RX ORDER — ONDANSETRON 2 MG/ML
4 INJECTION INTRAMUSCULAR; INTRAVENOUS
Status: DISCONTINUED | OUTPATIENT
Start: 2020-02-20 | End: 2020-02-24 | Stop reason: HOSPADM

## 2020-02-20 RX ADMIN — Medication 10 ML: at 22:02

## 2020-02-20 RX ADMIN — SODIUM CHLORIDE 80 MG: 900 INJECTION, SOLUTION INTRAVENOUS at 19:15

## 2020-02-20 RX ADMIN — PANTOPRAZOLE SODIUM 40 MG: 40 INJECTION, POWDER, FOR SOLUTION INTRAVENOUS at 23:14

## 2020-02-20 RX ADMIN — SODIUM CHLORIDE 40 MG: 9 INJECTION, SOLUTION INTRAMUSCULAR; INTRAVENOUS; SUBCUTANEOUS at 18:13

## 2020-02-20 RX ADMIN — SODIUM CHLORIDE 1000 ML: 900 INJECTION, SOLUTION INTRAVENOUS at 18:14

## 2020-02-20 RX ADMIN — SODIUM CHLORIDE, SODIUM LACTATE, POTASSIUM CHLORIDE, AND CALCIUM CHLORIDE 125 ML/HR: 600; 310; 30; 20 INJECTION, SOLUTION INTRAVENOUS at 22:02

## 2020-02-20 NOTE — ED PROVIDER NOTES
[address] Emergency Department    TRIAGE Provider NOTE:   48 yo male presents with dark red blood per rectum. Just discharged 2 days ago for GI bleed. Did nuclear bleeding scan that was negative. CT showed diverticula. Received one unit of blood. Started bleeding again today twice. Feels dizzy and lightheaded with weakness. Has nausea, no vomiting or abdominal pain. Pt pale in triage. Ill appearing. BP 80/48. Had another syncopal episode in triage during blood draw. Last dc summary:  [place].Jermaine y.o  male w/ PMH of Diverticulosis s/p partial colectomy and GERD s/p Nissen fundoplication admitted 3/45 HYS rectal bleed. Stated 4 day onset of dark stools then stated 4 large bright red stools 2/14. Last colonoscopy 1 year ago showing diverticulosis.    No complaints of N/V/D. Hemoglobin drop drom 12.6 to 9.4 now. Gi consulted and will provide supportive care at this time.     \"     Hospital Course:  He was admitted to general medical floor for GI bleed. Gastroenterology was consulted. He had a rapid response on 2/16 for bright red blood per rectum and a syncopal episode. Serial hemoglobins were checked and was noted to be at 7.8.  1 unit PRBCs was given. Diet was advanced as tolerated. He did not require colonoscopy. His bleeding resolved. He felt back to baseline on the day of discharge. \"    Appropriate tests ordered. Awaiting room. Yasmani Bautista MD; 2/20/2020 @5:57 PM===========================================          The history is provided by the patient. Rectal Bleeding    This is a recurrent problem. The current episode started 3 to 5 hours ago. The stool is described as bloody coated. Pertinent negatives include no abdominal pain, no abdominal distention, no fever, no vomiting and no diarrhea. Risk factors: Very recent GI bleed, just discharged home 2 days ago. He has tried nothing for the symptoms.         Past Medical History:   Diagnosis Date    Alcoholism /alcohol abuse ([facility] [address]  Anxiety     Cardiomyopathy ([facility] [address] 2016    Diverticular disease     colon resection with no symptoms since surgery    Essential hypertension     managed with med.  GERD (gastroesophageal reflux disease)     takes medication but not altogether relieved    Hiatal hernia with gastroesophageal reflux 2013    Hypertension     managed with med. Past Surgical History:   Procedure Laterality Date    HX COLECTOMY  2008    HX COLONOSCOPY  3/2018,     10 years    HX CYST REMOVAL  2017    renal cyst    HX HERNIA REPAIR      Juan Manuel fundiplication         Family History:   Problem Relation Age of Onset    Cancer Father         brain    Breast Cancer Mother     No Known Problems Brother        Social History     Socioeconomic History    Marital status:      Spouse name: Not on file    Number of children: Not on file    Years of education: Not on file    Highest education level: Not on file   Occupational History    Not on file   Social Needs    Financial resource strain: Not on file    Food insecurity:     Worry: Not on file     Inability: Not on file    Transportation needs:     Medical: Not on file     Non-medical: Not on file   Tobacco Use    Smoking status: Former Smoker     Years: 20.00     Last attempt to quit: 2015     Years since quittin.2    Smokeless tobacco: Never Used    Tobacco comment:  2 to 3 cigarettes per day.    Substance and Sexual Activity    Alcohol use: No     Alcohol/week: 0.0 standard drinks     Comment: last drink 3/2015 sober    Drug use: No    Sexual activity: Yes     Partners: Female   Lifestyle    Physical activity:     Days per week: Not on file     Minutes per session: Not on file    Stress: Not on file   Relationships    Social connections:     Talks on phone: Not on file     Gets together: Not on file     Attends [employer] service: Not on file     Active member of club or organization: Not on file     Attends meetings of clubs or organizations: Not on file     Relationship status: Not on file    Intimate partner violence:     Fear of current or ex partner: Not on file     Emotionally abused: Not on file     Physically abused: Not on file     Forced sexual activity: Not on file   Other Topics Concern    Not on file   Social History Narrative    Not on file         ALLERGIES: Pcn [penicillins]    Review of Systems   Constitutional: Positive for fatigue. Negative for fever. Respiratory: Negative for shortness of breath. Cardiovascular: Negative for chest pain. Gastrointestinal: Positive for anal bleeding. Negative for abdominal distention, abdominal pain, diarrhea and vomiting. Skin: Positive for pallor. Neurological: Positive for syncope and light-headedness. There were no vitals filed for this visit. Physical Exam  Vitals signs and nursing note reviewed. HENT:      Mouth/Throat:      Mouth: Mucous membranes are moist.   Eyes:      Comments: Pale conjunctive a   Cardiovascular:      Rate and Rhythm: Normal rate and regular rhythm. Heart sounds: Normal heart sounds. Pulmonary:      Effort: Pulmonary effort is normal.      Breath sounds: Normal breath sounds. Abdominal:      General: There is no distension. Palpations: Abdomen is soft. Tenderness: There is no abdominal tenderness. Genitourinary:     Rectum: Guaiac result positive ( Bright red blood on rectal exam slightly maroonish). Musculoskeletal: Normal range of motion. Skin:     General: Skin is warm and dry. Coloration: Skin is pale. Neurological:      Mental Status: He is alert. MDM  Number of Diagnoses or Management Options  Diagnosis management comments: Recurrent GI bleed. Patient is not on blood thinners or aspirin. History of diverticulosis and diverticulitis with partial colectomy in the past.  This past recent admission was the only GI bleed. likely vasovagal in triage.   Certainly hypotension from hypovolemia possible. Blood pressure improved with only 300 to 400 cc of normal saline upon my evaluation of him in room 2.  1 unit of packed red blood cells to be prepared. Hemoglobin pending. Readmission pending.    7:06 PM  Patient stable. Hemoglobin stable for now. Bright red blood on rectal exam and patient will require observation and serial hemoglobins. 1 unit of blood is ready for transfusion if needed. Amount and/or Complexity of Data Reviewed  Clinical lab tests: ordered and reviewed (Results for orders placed or performed during the hospital encounter of 02/20/20  -CBC WITH AUTOMATED DIFF       Result                      Value             Ref Range           WBC                         4.9               4.3 - 11.1 K*       RBC                         2.73 (L)          4.23 - 5.6 M*       HGB                         8.9 (L)           13.6 - 17.2 *       HCT                         27.0 (L)          41.1 - 50.3 %       MCV                         98.9 (H)          79.6 - 97.8 *       MCH                         32.6              26.1 - 32.9 *       MCHC                        33.0              31.4 - 35.0 *       RDW                         13.5              11.9 - 14.6 %       PLATELET                    205               150 - 450 K/*       MPV                         9.6               9.4 - 12.3 FL       ABSOLUTE NRBC               0.00              0.0 - 0.2 K/*       DF                          AUTOMATED                             NEUTROPHILS                 52                43 - 78 %           LYMPHOCYTES                 37                13 - 44 %           MONOCYTES                   6                 4.0 - 12.0 %        EOSINOPHILS                 4                 0.5 - 7.8 %         BASOPHILS                   0                 0.0 - 2.0 %         IMMATURE GRANULOCYTES       0                 0.0 - 5.0 %         ABS. NEUTROPHILS            2.6               1.7 - 8.2 K/*       ABS. LYMPHOCYTES            1.8               0.5 - 4.6 K/*       ABS. MONOCYTES              0.3               0.1 - 1.3 K/*       ABS. EOSINOPHILS            0.2               0.0 - 0.8 K/*       ABS. BASOPHILS              0.0               0.0 - 0.2 K/*       ABS. IMM. GRANS.            0.0               0.0 - 0.5 K/*  -METABOLIC PANEL, COMPREHENSIVE       Result                      Value             Ref Range           Sodium                      143               136 - 145 mm*       Potassium                   3.4 (L)           3.5 - 5.1 mm*       Chloride                    110 (H)           98 - 107 mmo*       CO2                         26                21 - 32 mmol*       Anion gap                   7                 7 - 16 mmol/L       Glucose                     135 (H)           65 - 100 mg/*       BUN                         13                6 - 23 MG/DL        Creatinine                  0.76 (L)          0.8 - 1.5 MG*       GFR est AA                  >60               >60 ml/min/1*       GFR est non-AA              >60               >60 ml/min/1*       Calcium                     7.9 (L)           8.3 - 10.4 M*       Bilirubin, total            0.2               0.2 - 1.1 MG*       ALT (SGPT)                  29                12 - 65 U/L         AST (SGOT)                  19                15 - 37 U/L         Alk.  phosphatase            32 (L)            50 - 136 U/L        Protein, total              5.7 (L)           6.3 - 8.2 g/*       Albumin                     3.0 (L)           3.5 - 5.0 g/*       Globulin                    2.7               2.3 - 3.5 g/*       A-G Ratio                   1.1 (L)           1.2 - 3.5      -PROTHROMBIN TIME + INR       Result                      Value             Ref Range           Prothrombin time            12.4              12.0 - 14.7 *       INR                         0.9                              -PTT       Result                      Value Ref Range           aPTT                        25.9              24.3 - 35.4 *  -TYPE & SCREEN       Result                      Value             Ref Range           Crossmatch Expiration       02/23/2020                            ABO/Rh(D)                   A POSITIVE                            Antibody screen             NEG                                   Unit number                 [id number]                         Blood component type         LR                                 Unit division               00                                    Status of unit              ALLOCATED                             Crossmatch result           Compatible                       )           Procedures

## 2020-02-21 LAB
ABO + RH BLD: NORMAL
ANION GAP SERPL CALC-SCNC: 4 MMOL/L (ref 7–16)
ATRIAL RATE: 78 BPM
BLD PROD TYP BPU: NORMAL
BLOOD GROUP ANTIBODIES SERPL: NORMAL
BPU ID: NORMAL
BUN SERPL-MCNC: 14 MG/DL (ref 6–23)
CALCIUM SERPL-MCNC: 7.8 MG/DL (ref 8.3–10.4)
CALCULATED P AXIS, ECG09: 59 DEGREES
CALCULATED R AXIS, ECG10: 35 DEGREES
CALCULATED T AXIS, ECG11: -4 DEGREES
CHLORIDE SERPL-SCNC: 115 MMOL/L (ref 98–107)
CO2 SERPL-SCNC: 26 MMOL/L (ref 21–32)
CREAT SERPL-MCNC: 0.65 MG/DL (ref 0.8–1.5)
CROSSMATCH RESULT,%XM: NORMAL
DIAGNOSIS, 93000: NORMAL
GLUCOSE SERPL-MCNC: 103 MG/DL (ref 65–100)
HCT VFR BLD AUTO: 24.7 % (ref 41.1–50.3)
HCT VFR BLD AUTO: 25.6 % (ref 41.1–50.3)
HCT VFR BLD AUTO: 28.1 % (ref 41.1–50.3)
HCT VFR BLD AUTO: 28.3 % (ref 41.1–50.3)
HGB BLD-MCNC: 8.2 G/DL (ref 13.6–17.2)
HGB BLD-MCNC: 8.5 G/DL (ref 13.6–17.2)
HGB BLD-MCNC: 9.2 G/DL (ref 13.6–17.2)
HGB BLD-MCNC: 9.4 G/DL (ref 13.6–17.2)
MAGNESIUM SERPL-MCNC: 2.1 MG/DL (ref 1.8–2.4)
P-R INTERVAL, ECG05: 124 MS
PHOSPHATE SERPL-MCNC: 3.3 MG/DL (ref 2.5–4.5)
POTASSIUM SERPL-SCNC: 3.5 MMOL/L (ref 3.5–5.1)
Q-T INTERVAL, ECG07: 368 MS
QRS DURATION, ECG06: 90 MS
QTC CALCULATION (BEZET), ECG08: 419 MS
SODIUM SERPL-SCNC: 145 MMOL/L (ref 136–145)
SPECIMEN EXP DATE BLD: NORMAL
STATUS OF UNIT,%ST: NORMAL
UNIT DIVISION, %UDIV: 0
VENTRICULAR RATE, ECG03: 78 BPM

## 2020-02-21 PROCEDURE — 80048 BASIC METABOLIC PNL TOTAL CA: CPT

## 2020-02-21 PROCEDURE — C9113 INJ PANTOPRAZOLE SODIUM, VIA: HCPCS | Performed by: INTERNAL MEDICINE

## 2020-02-21 PROCEDURE — 65660000000 HC RM CCU STEPDOWN

## 2020-02-21 PROCEDURE — 84100 ASSAY OF PHOSPHORUS: CPT

## 2020-02-21 PROCEDURE — 36415 COLL VENOUS BLD VENIPUNCTURE: CPT

## 2020-02-21 PROCEDURE — 83735 ASSAY OF MAGNESIUM: CPT

## 2020-02-21 PROCEDURE — 74011000250 HC RX REV CODE- 250: Performed by: INTERNAL MEDICINE

## 2020-02-21 PROCEDURE — 85018 HEMOGLOBIN: CPT

## 2020-02-21 PROCEDURE — 74011250636 HC RX REV CODE- 250/636: Performed by: INTERNAL MEDICINE

## 2020-02-21 RX ORDER — POLYETHYLENE GLYCOL 3350 17 G/17G
255 POWDER, FOR SOLUTION ORAL ONCE
Status: DISCONTINUED | OUTPATIENT
Start: 2020-02-21 | End: 2020-02-21

## 2020-02-21 RX ORDER — BISACODYL 5 MG
10 TABLET, DELAYED RELEASE (ENTERIC COATED) ORAL
Status: DISPENSED | OUTPATIENT
Start: 2020-02-21 | End: 2020-02-22

## 2020-02-21 RX ADMIN — PANTOPRAZOLE SODIUM 40 MG: 40 INJECTION, POWDER, FOR SOLUTION INTRAVENOUS at 20:01

## 2020-02-21 RX ADMIN — Medication 10 ML: at 05:16

## 2020-02-21 RX ADMIN — Medication 10 ML: at 13:12

## 2020-02-21 RX ADMIN — SODIUM CHLORIDE, SODIUM LACTATE, POTASSIUM CHLORIDE, AND CALCIUM CHLORIDE 125 ML/HR: 600; 310; 30; 20 INJECTION, SOLUTION INTRAVENOUS at 09:55

## 2020-02-21 RX ADMIN — PANTOPRAZOLE SODIUM 40 MG: 40 INJECTION, POWDER, FOR SOLUTION INTRAVENOUS at 08:16

## 2020-02-21 RX ADMIN — Medication 10 ML: at 20:01

## 2020-02-21 NOTE — PROGRESS NOTES
TRANSFER - IN REPORT:    Verbal report received from Gustavo Morelos on [facility]  being received from ED for routine progression of care      Report consisted of patients Situation, Background, Assessment and   Recommendations(SBAR). Information from the following report(s) SBAR was reviewed with the receiving nurse. Opportunity for questions and clarification was provided. Assessment completed upon patients arrival to unit and care assumed.

## 2020-02-21 NOTE — PROGRESS NOTES
Hospitalist Note     Admit Date:  2020  5:55 PM   Name:  Papi Lee   Age:  47 y.o.  :  1965   MRN:  [id number]   PCP:  Srinivasan Prieto NP  Treatment Team: Attending Provider: Victor M Bernal MD; Consulting Provider: Nelson Ojeda MD; Care Manager: Jazmyn Hanley RN; Utilization Review: Kaz Peterson RN    HPI/Subjective:       Mr. Richy Robledo is a 46 yo male with PMH of GI bleed, diverticulitis s/p partial colectomy, HTN, GERD s/p Nissen fundoplication, admitted with recurrent GI bleed. He was discharged 20 after admit for GI bleed s/p negative NM bleed scan and GI consult. He additionally had syncope though related to acute blood loss anemia. Upon re-admit, he had syncope in the ED with associated hypotension. HGB was 8.9 and received 1 unit PRBC. GI re consulted with plans for colonoscopy tomorrow. Discharge plans pending to home with wife.        20  No further bleeding, tolerant to clears, no dyspnea , no abd pain, feels his syncope is vasovagal related       Objective:     Patient Vitals for the past 24 hrs:   Temp Pulse Resp BP SpO2   20 1117 97.8 °F (36.6 °C) 80 18 105/69 100 %   20 0721 98.2 °F (36.8 °C) 89 18 106/61 94 %   20 0340 97.6 °F (36.4 °C) 83 18 101/64 97 %   20 2201    109/63    20 2131 98.1 °F (36.7 °C) 80 20 108/62 97 %   20  82 18 99/58 97 %   20 98 °F (36.7 °C) 85 19 93/56 97 %   20  83 19 101/55 97 %   20  89 19 (!) 89/50 98 %   20 98.3 °F (36.8 °C) 87 17 (!) 89/50 99 %   20 98.5 °F (36.9 °C) 86 17 102/58 99 %   20 1816  75 11 101/59 100 %   20 1813  79 16 102/58 99 %   20 1806  82 14 97/63 97 %   20 1758 98.2 °F (36.8 °C) (!) 108 18 (!) 80/48 99 %     Oxygen Therapy  O2 Sat (%): 100 % (20 1117)  Pulse via Oximetry: 83 beats per minute (20 2101)  O2 Device: Room air (20 0806)    Estimated body mass index is 28.74 kg/m² as calculated from the following:    Height as of this encounter: 5' 8\" (1.727 m). Weight as of this encounter: 85.7 kg (189 lb). Intake/Output Summary (Last 24 hours) at 2/21/2020 1258  Last data filed at 2/21/2020 2007  Gross per 24 hour   Intake 2988 ml   Output 1200 ml   Net 1788 ml       *Note that automatically entered I/Os may not be accurate; dependent on patient compliance with collection and accurate  by techs. General:    Well nourished. Alert. No distress   CV:   RRR. No murmur, rub, or gallop. No edema  Lungs:   CTAB. No wheezing, rhonchi, or rales. Abdomen:   Soft, nontender, nondistended. Extremities: Warm and dry. Skin:     No rashes or jaundice. Neuro:  No gross focal deficits    Data Review:  I have reviewed all labs, meds, and studies from the last 24 hours:    Recent Results (from the past 24 hour(s))   EKG, 12 LEAD, INITIAL    Collection Time: 02/20/20  6:07 PM   Result Value Ref Range    Ventricular Rate 78 BPM    Atrial Rate 78 BPM    P-R Interval 124 ms    QRS Duration 90 ms    Q-T Interval 368 ms    QTC Calculation (Bezet) 419 ms    Calculated P Axis 59 degrees    Calculated R Axis 35 degrees    Calculated T Axis -4 degrees    Diagnosis       !! AGE AND GENDER SPECIFIC ECG ANALYSIS !!   Sinus rhythm with occasional Premature ventricular complexes  Otherwise normal ECG  When compared with ECG of 09-SEP-2009 10:18,  Premature ventricular complexes are now Present  T wave inversion now evident in Inferior leads  Confirmed by Holden Hou MD (), [place] ([id number]) on 2/21/2020 7:16:30 AM     TYPE & SCREEN    Collection Time: 02/20/20  6:09 PM   Result Value Ref Range    Crossmatch Expiration 02/23/2020     ABO/Rh(D) A POSITIVE     Antibody screen NEG     Unit number [id number]     Blood component type  LR     Unit division 00     Status of unit TRANSFUSED     Crossmatch result Compatible    CBC WITH AUTOMATED DIFF    Collection Time: 02/20/20 6:09 PM   Result Value Ref Range    WBC 4.9 4.3 - 11.1 K/uL    RBC 2.73 (L) 4.23 - 5.6 M/uL    HGB 8.9 (L) 13.6 - 17.2 g/dL    HCT 27.0 (L) 41.1 - 50.3 %    MCV 98.9 (H) 79.6 - 97.8 FL    MCH 32.6 26.1 - 32.9 PG    MCHC 33.0 31.4 - 35.0 g/dL    RDW 13.5 11.9 - 14.6 %    PLATELET 884 422 - 287 K/uL    MPV 9.6 9.4 - 12.3 FL    ABSOLUTE NRBC 0.00 0.0 - 0.2 K/uL    DF AUTOMATED      NEUTROPHILS 52 43 - 78 %    LYMPHOCYTES 37 13 - 44 %    MONOCYTES 6 4.0 - 12.0 %    EOSINOPHILS 4 0.5 - 7.8 %    BASOPHILS 0 0.0 - 2.0 %    IMMATURE GRANULOCYTES 0 0.0 - 5.0 %    ABS. NEUTROPHILS 2.6 1.7 - 8.2 K/UL    ABS. LYMPHOCYTES 1.8 0.5 - 4.6 K/UL    ABS. MONOCYTES 0.3 0.1 - 1.3 K/UL    ABS. EOSINOPHILS 0.2 0.0 - 0.8 K/UL    ABS. BASOPHILS 0.0 0.0 - 0.2 K/UL    ABS. IMM. GRANS. 0.0 0.0 - 0.5 K/UL   METABOLIC PANEL, COMPREHENSIVE    Collection Time: 02/20/20  6:09 PM   Result Value Ref Range    Sodium 143 136 - 145 mmol/L    Potassium 3.4 (L) 3.5 - 5.1 mmol/L    Chloride 110 (H) 98 - 107 mmol/L    CO2 26 21 - 32 mmol/L    Anion gap 7 7 - 16 mmol/L    Glucose 135 (H) 65 - 100 mg/dL    BUN 13 6 - 23 MG/DL    Creatinine 0.76 (L) 0.8 - 1.5 MG/DL    GFR est AA >60 >60 ml/min/1.73m2    GFR est non-AA >60 >60 ml/min/1.73m2    Calcium 7.9 (L) 8.3 - 10.4 MG/DL    Bilirubin, total 0.2 0.2 - 1.1 MG/DL    ALT (SGPT) 29 12 - 65 U/L    AST (SGOT) 19 15 - 37 U/L    Alk.  phosphatase 32 (L) 50 - 136 U/L    Protein, total 5.7 (L) 6.3 - 8.2 g/dL    Albumin 3.0 (L) 3.5 - 5.0 g/dL    Globulin 2.7 2.3 - 3.5 g/dL    A-G Ratio 1.1 (L) 1.2 - 3.5     PROTHROMBIN TIME + INR    Collection Time: 02/20/20  6:09 PM   Result Value Ref Range    Prothrombin time 12.4 12.0 - 14.7 sec    INR 0.9     PTT    Collection Time: 02/20/20  6:09 PM   Result Value Ref Range    aPTT 25.9 24.3 - 35.4 SEC   HGB & HCT    Collection Time: 02/21/20 12:28 AM   Result Value Ref Range    HGB 8.5 (L) 13.6 - 17.2 g/dL    HCT 25.6 (L) 41.1 - 50.3 %   HGB & HCT    Collection Time: 02/21/20 5:51 AM   Result Value Ref Range    HGB 8.2 (L) 13.6 - 17.2 g/dL    HCT 24.7 (L) 41.1 - 49.8 %   METABOLIC PANEL, BASIC    Collection Time: 02/21/20  5:51 AM   Result Value Ref Range    Sodium 145 136 - 145 mmol/L    Potassium 3.5 3.5 - 5.1 mmol/L    Chloride 115 (H) 98 - 107 mmol/L    CO2 26 21 - 32 mmol/L    Anion gap 4 (L) 7 - 16 mmol/L    Glucose 103 (H) 65 - 100 mg/dL    BUN 14 6 - 23 MG/DL    Creatinine 0.65 (L) 0.8 - 1.5 MG/DL    GFR est AA >60 >60 ml/min/1.73m2    GFR est non-AA >60 >60 ml/min/1.73m2    Calcium 7.8 (L) 8.3 - 10.4 MG/DL   MAGNESIUM    Collection Time: 02/21/20  5:51 AM   Result Value Ref Range    Magnesium 2.1 1.8 - 2.4 mg/dL   PHOSPHORUS    Collection Time: 02/21/20  5:51 AM   Result Value Ref Range    Phosphorus 3.3 2.5 - 4.5 MG/DL   HGB & HCT    Collection Time: 02/21/20 12:14 PM   Result Value Ref Range    HGB 9.4 (L) 13.6 - 17.2 g/dL    HCT 28.3 (L) 41.1 - 50.3 %        All Micro Results     None          Current Meds:  Current Facility-Administered Medications   Medication Dose Route Frequency    polyethylene glycol (MIRALAX) powder 255 g  255 g Oral ONCE    bisacodyL (DULCOLAX) tablet 10 mg  10 mg Oral NOW    0.9% sodium chloride infusion 250 mL  250 mL IntraVENous PRN    lactated Ringers infusion  125 mL/hr IntraVENous CONTINUOUS    0.9% sodium chloride infusion 250 mL  250 mL IntraVENous PRN    pantoprazole (PROTONIX) 40 mg in 0.9% sodium chloride 10 mL injection  40 mg IntraVENous Q12H    sodium chloride (NS) flush 5-40 mL  5-40 mL IntraVENous Q8H    sodium chloride (NS) flush 5-40 mL  5-40 mL IntraVENous PRN    ondansetron (ZOFRAN) injection 4 mg  4 mg IntraVENous Q4H PRN       Other Studies:  No results found for this visit on 02/20/20. No results found.     Assessment and Plan:     Hospital Problems as of 2/21/2020 Date Reviewed: 2/15/2020          Codes Class Noted - Resolved POA    GI bleed ICD-10-CM: K92.2  ICD-9-CM: 578.9  2/15/2020 - Present Unknown Plan:  · GI bleed/ anemia: appreciate GI, for colonoscopy tomorrow, trending labs, transfuse HGB < 7.0  · Syncope: possibly hypotension induced/ vasovagal related, stop IVF    DC planning/Dispo:  pending      Diet:  DIET CLEAR LIQUID  DIET NPO  DVT ppx:  SCD    Signed:  Renny Rodrigez MD

## 2020-02-21 NOTE — H&P
Hospitalist Note     Admit Date:  2020  5:55 PM   Name:  Austin Wood   Age:  47 y.o.  :  1965   MRN:  [id number]   PCP:  Francie Pastor NP  Treatment Team: Primary Nurse: Erick Euceda RN    HPI/Subjective: The patient is a 79-year-old male with past medical history of diverticulitis (status post partial colectomy), HTN, GERD (status post Nissen fundoplication), PVCs, and GI bleed who presents to the emergency department with bright red blood per rectum. Of note patient recently admitted and discharged  with similar presentation. At that time patient with stable hemoglobin, underwent negative nuclear bleeding scan and was discharged for outpatient follow-up with GI. Previous hospitalization complicated by episode of syncope deemed secondary to acute blood loss. Patient reports upon discharge from hospital he was having nonbloody stools. Patient reports he had episode of bloody stool earlier today at 12 PM.  He reports at that time there was not a large amount of blood in toilet. He reports second episode of bloody bowel movements at 5:30 PM with large volume of blood. He denies any NSAID usage at this time. Currently not taking PPI. On arrival to ED patient with episode of syncope in triage. Patient reportedly unresponsive for 30 seconds with some shaking and confusion upon return to consciousness. Patient reports he has a history of syncope, including upon seeing blood draws. At time of examination patient complains of generalized weakness, lightheadedness, and mild dizziness. He reports intermittent palpitations present both at rest and activity. He has a history of known PVCs. Patient denies any chest pain, shortness of breath, abdominal pain, urinary symptoms, myalgias or arthralgias. 10 systems reviewed and negative except as noted in HPI.   Past Medical History:   Diagnosis Date    Alcoholism /alcohol abuse ([facility] [address]     Anxiety     Cardiomyopathy ([facility] [address] 2016    Diverticular disease     colon resection with no symptoms since surgery    Essential hypertension     managed with med.  GERD (gastroesophageal reflux disease)     takes medication but not altogether relieved    Hiatal hernia with gastroesophageal reflux 2013    Hypertension     managed with med. Past Surgical History:   Procedure Laterality Date    HX COLECTOMY  2008    HX COLONOSCOPY  3/2018,     10 years    HX CYST REMOVAL  2017    renal cyst    HX HERNIA REPAIR      Juan Manuel fundiplication      Allergies   Allergen Reactions    Pcn [Penicillins] Hives      Social History     Tobacco Use    Smoking status: Former Smoker     Years: 20.00     Last attempt to quit: 2015     Years since quittin.2    Smokeless tobacco: Never Used    Tobacco comment:  2 to 3 cigarettes per day. Substance Use Topics    Alcohol use: No     Alcohol/week: 0.0 standard drinks     Comment: last drink 3/2015 sober      Family History   Problem Relation Age of Onset    Cancer Father         brain    Breast Cancer Mother     No Known Problems Brother       Immunization History   Administered Date(s) Administered    Tdap 11/10/2016     PTA Medications:  Prior to Admission Medications   Prescriptions Last Dose Informant Patient Reported? Taking?   ascorbic acid (VITAMIN C) 1,000 mg tablet   Yes No   Sig: Take 1,000 mg by mouth every morning. cyanocobalamin 1,000 mcg tablet   Yes No   Sig: Take 1,000 mcg by mouth daily. multivitamin (ONE A DAY) tablet   Yes No   Sig: Take 1 Tab by mouth every morning.         Facility-Administered Medications: None       Objective:     Patient Vitals for the past 24 hrs:   Temp Pulse Resp BP SpO2   20 98.3 °F (36.8 °C) 87 17 (!) 89/50 99 %   20 1930 98.5 °F (36.9 °C) 86 17 102/58 99 %   20 1816  75 11 101/59 100 %   20 1813  79 16 102/58 99 %   20 1806  82 14 97/63 97 %   20 1758 98.2 °F (36.8 °C) (!) 108 18 (!) 80/48 99 %     Oxygen Therapy  O2 Sat (%): 99 % (02/20/20 1958)  Pulse via Oximetry: 76 beats per minute (02/20/20 1816)  O2 Device: Room air (02/20/20 1758)    Estimated body mass index is 29.5 kg/m² as calculated from the following:    Height as of this encounter: 5' 8\" (1.727 m). Weight as of this encounter: 88 kg (194 lb). Intake/Output Summary (Last 24 hours) at 2/20/2020 2000  Last data filed at 2/20/2020 1912  Gross per 24 hour   Intake 1000 ml   Output    Net 1000 ml       *Note that automatically entered I/Os may not be accurate; dependent on patient compliance with collection and accurate  by assistants. Physical Exam:  General: Male in no acute distress  Eyes: EOMI, nonicteric  ENT: Moist mucous membranes  Cardiovascular: RRR, no significant rubs or murmurs appreciated  Respiratory: No wheezes, rales, or rhonchi; clear to auscultation bilaterally  Gastrointestinal: Soft, nontender, nondistended, decreased bowel sounds  Genitourinary: No suprapubic tenderness  Musculoskeletal: No joint swelling appreciated  Skin: No lesions on examined area  Neurological: Alert and oriented, no focal deficits noted  Psychiatric: Normal mood and affect    I reviewed the labs, imaging, EKGs, telemetry, and other studies done this admission. Data Review:   Recent Results (from the past 24 hour(s))   EKG, 12 LEAD, INITIAL    Collection Time: 02/20/20  6:07 PM   Result Value Ref Range    Ventricular Rate 78 BPM    Atrial Rate 78 BPM    P-R Interval 124 ms    QRS Duration 90 ms    Q-T Interval 368 ms    QTC Calculation (Bezet) 419 ms    Calculated P Axis 59 degrees    Calculated R Axis 35 degrees    Calculated T Axis -4 degrees    Diagnosis       !! AGE AND GENDER SPECIFIC ECG ANALYSIS !!   Sinus rhythm with occasional Premature ventricular complexes  Otherwise normal ECG  When compared with ECG of 09-SEP-2009 10:18,  Premature ventricular complexes are now Present  T wave inversion now evident in Inferior leads     TYPE & SCREEN    Collection Time: 02/20/20  6:09 PM   Result Value Ref Range    Crossmatch Expiration 02/23/2020     ABO/Rh(D) A POSITIVE     Antibody screen NEG     Unit number [id number]     Blood component type RC LR     Unit division 00     Status of unit ISSUED     Crossmatch result Compatible    CBC WITH AUTOMATED DIFF    Collection Time: 02/20/20  6:09 PM   Result Value Ref Range    WBC 4.9 4.3 - 11.1 K/uL    RBC 2.73 (L) 4.23 - 5.6 M/uL    HGB 8.9 (L) 13.6 - 17.2 g/dL    HCT 27.0 (L) 41.1 - 50.3 %    MCV 98.9 (H) 79.6 - 97.8 FL    MCH 32.6 26.1 - 32.9 PG    MCHC 33.0 31.4 - 35.0 g/dL    RDW 13.5 11.9 - 14.6 %    PLATELET 960 784 - 039 K/uL    MPV 9.6 9.4 - 12.3 FL    ABSOLUTE NRBC 0.00 0.0 - 0.2 K/uL    DF AUTOMATED      NEUTROPHILS 52 43 - 78 %    LYMPHOCYTES 37 13 - 44 %    MONOCYTES 6 4.0 - 12.0 %    EOSINOPHILS 4 0.5 - 7.8 %    BASOPHILS 0 0.0 - 2.0 %    IMMATURE GRANULOCYTES 0 0.0 - 5.0 %    ABS. NEUTROPHILS 2.6 1.7 - 8.2 K/UL    ABS. LYMPHOCYTES 1.8 0.5 - 4.6 K/UL    ABS. MONOCYTES 0.3 0.1 - 1.3 K/UL    ABS. EOSINOPHILS 0.2 0.0 - 0.8 K/UL    ABS. BASOPHILS 0.0 0.0 - 0.2 K/UL    ABS. IMM. GRANS. 0.0 0.0 - 0.5 K/UL   METABOLIC PANEL, COMPREHENSIVE    Collection Time: 02/20/20  6:09 PM   Result Value Ref Range    Sodium 143 136 - 145 mmol/L    Potassium 3.4 (L) 3.5 - 5.1 mmol/L    Chloride 110 (H) 98 - 107 mmol/L    CO2 26 21 - 32 mmol/L    Anion gap 7 7 - 16 mmol/L    Glucose 135 (H) 65 - 100 mg/dL    BUN 13 6 - 23 MG/DL    Creatinine 0.76 (L) 0.8 - 1.5 MG/DL    GFR est AA >60 >60 ml/min/1.73m2    GFR est non-AA >60 >60 ml/min/1.73m2    Calcium 7.9 (L) 8.3 - 10.4 MG/DL    Bilirubin, total 0.2 0.2 - 1.1 MG/DL    ALT (SGPT) 29 12 - 65 U/L    AST (SGOT) 19 15 - 37 U/L    Alk.  phosphatase 32 (L) 50 - 136 U/L    Protein, total 5.7 (L) 6.3 - 8.2 g/dL    Albumin 3.0 (L) 3.5 - 5.0 g/dL    Globulin 2.7 2.3 - 3.5 g/dL    A-G Ratio 1.1 (L) 1.2 - 3.5     PROTHROMBIN TIME + INR    Collection Time: 02/20/20  6:09 PM   Result Value Ref Range    Prothrombin time 12.4 12.0 - 14.7 sec    INR 0.9     PTT    Collection Time: 02/20/20  6:09 PM   Result Value Ref Range    aPTT 25.9 24.3 - 35.4 SEC       All Micro Results     None          Current Facility-Administered Medications   Medication Dose Route Frequency    pantoprazole (PROTONIX) 80 mg in 0.9% sodium chloride 250 mL infusion  80 mg IntraVENous CONTINUOUS    0.9% sodium chloride infusion 250 mL  250 mL IntraVENous PRN    lactated Ringers infusion  125 mL/hr IntraVENous CONTINUOUS    0.9% sodium chloride infusion 250 mL  250 mL IntraVENous PRN     Current Outpatient Medications   Medication Sig    cyanocobalamin 1,000 mcg tablet Take 1,000 mcg by mouth daily.  ascorbic acid (VITAMIN C) 1,000 mg tablet Take 1,000 mg by mouth every morning.  multivitamin (ONE A DAY) tablet Take 1 Tab by mouth every morning. Other Studies:  No results found. Assessment and Plan:     Hospital Problems as of 2/20/2020 Date Reviewed: 2/15/2020          Codes Class Noted - Resolved POA    GI bleed ICD-10-CM: K92.2  ICD-9-CM: 578.9  2/15/2020 - Present Unknown            70-year-old male presenting with GI bleed and syncope. Extensive GI history including diverticulitis status post partial colon resection and GERD status post Nissen fundoplication.     GI bleed  Hgb: 8.9  1 unit PRBC transfused in ED    Plan:  -H&H every 8  -Protonix 40 mg IV every 12  -Transfuse for hemoglobin <7  -N.p.o.  -IVF  -Gastroenterology consult    Syncope  Most likely secondary to acute blood loss versus vasovagal syncope  Patient reports history of syncope upon seeing blood draws    Plan:  -Orthostatic blood pressure  -Telemetry monitoring  -Bedrest    Hypotension  Patient with history of hypertension not currently on any medication  Most likely secondary to acute blood loss    Plan:  -IVF    Palpitations  Known history of PVCs    Plan:  -Telemetry monitoring    Discharge planning: Home with office follow-up  DVT ppx ordered  Code status:  Full  Estimated LOS:  Greater than 2 midnights  Risk:  high    Signed:  Quyen Copeland MD

## 2020-02-21 NOTE — CONSULTS
[facility] Consult Note       Primary GI Physician: Dr. Ceasar Austin    Referring Provider:  Dr. Kaitlin Maria    Consult Date:  2/21/2020    Admit Date:  2/20/2020    Chief Complaint:  GI bleed    Subjective:     History of Present Illness:  Patient is a 47 y.o. male with PMH of etoh abuse, anxiety, cardiomyopathy, diverticulosis s/p colon resection, GERD, s/p Nissen, HTN, who is seen in consultation at the request of Dr. Kaitlin Maria for GI bleeding. Mr. Mj Reed was recently admitted from 2/15/2020 until 2/18/2020 for GI bleeding thought to be likely diverticular in nature. He had a syncopal event in the ED on that admission with complaints of large amounts of painless hematochezia. CT with no acute process, diverticulosis. HGB was 12.6 initially, on that admission. We were consulted and due to having a recent colonoscopy in March 2018 with DR. Ceasar Austin with findings of diverticulosis, this was not repeated. He had a NM bleeding scan on 2/16 which was negative. His HGB drifted down to 7.8 during that admission and he required 1 unit of PRBC transfusion. His HGB was 9.2 at discharge. He was discharged home after not having a bloody BM since noon on 2/16. Mr. Mj Reed presented to the ED on 2/20 with complaints of return of rectal bleeding. The bleeding resumed after noon on 2/20. He reports that at first there was a small amount of bright red blood mixed in with stool and with wiping. Then, he had another large volume red bloody stool. HGB on arrival was 8.9. He was transfused 1 unit of PRBC in the ED. He had a syncopal event in the ED with shaking and confusion, upon return to consciousness. However, reports a history of syncope with lab draws. He was hypotensive on arrival with a BP of 80/48, . He has been afebrile. Last episode of bleeding was prior to arrival tot he ED. He reports a large volume red stool. He did not notice any clots.  Today, he reports feeling the need to have a Bm but inability to pass a stool. He denies any abdominal pain, nausea, or vomiting. Denies any NSAID use. He is not on any AC therapy. His wife reports worry about leaving him alone at home due to the bleeding and repeated syncope.      Pt is known to Dr Kelsea Sanabria. Last EGD 1/9/2013 for GERD revealed HH. Last colonoscopy 3/9/18 for screening revealed diverticulosis. Recall recommended for 10 years. PMH:  Past Medical History:   Diagnosis Date    [facility]ism /al[facility] abuse ([facility] [address]     Anxiety     Cardiomyopathy ([facility] [address] 6/30/2016    Diverticular disease     colon resection with no symptoms since surgery    Essential hypertension     managed with med.  GERD (gastroesophageal reflux disease)     takes medication but not altogether relieved    Hiatal hernia with gastroesophageal reflux 2/5/2013    Hypertension     managed with med. PSH:  Past Surgical History:   Procedure Laterality Date    HX COLECTOMY  4/2008    HX COLONOSCOPY  3/2018, 2008    10 years    HX CYST REMOVAL  2017    renal cyst    HX HERNIA REPAIR      Juan Manuel fundiplication       Allergies: Allergies   Allergen Reactions    Pcn [Penicillins] Hives       Home Medications:  Prior to Admission medications    Medication Sig Start Date End Date Taking? Authorizing Provider   cyanocobalamin 1,000 mcg tablet Take 1,000 mcg by mouth daily. Provider, Historical   ascorbic acid (VITAMIN C) 1,000 mg tablet Take 1,000 mg by mouth every morning. Provider, Historical   multivitamin (ONE A DAY) tablet Take 1 Tab by mouth every morning.       Provider, Historical       Hospital Medications:  Current Facility-Administered Medications   Medication Dose Route Frequency    0.9% sodium chloride infusion 250 mL  250 mL IntraVENous PRN    lactated Ringers infusion  125 mL/hr IntraVENous CONTINUOUS    0.9% sodium chloride infusion 250 mL  250 mL IntraVENous PRN    pantoprazole (PROTONIX) 40 mg in 0.9% sodium chloride 10 mL injection  40 mg IntraVENous Q12H    sodium chloride (NS) flush 5-40 mL  5-40 mL IntraVENous Q8H    sodium chloride (NS) flush 5-40 mL  5-40 mL IntraVENous PRN    ondansetron (ZOFRAN) injection 4 mg  4 mg IntraVENous Q4H PRN       Social History:  Social History     Tobacco Use    Smoking status: Former Smoker     Years: 20.00     Last attempt to quit: 2015     Years since quittin.2    Smokeless tobacco: Never Used    Tobacco comment:  2 to 3 cigarettes per day. Substance Use Topics    Alcohol use: No     Alcohol/week: 0.0 standard drinks     Comment: last drink 3/2015 sober         Family History:  Family History   Problem Relation Age of Onset    Cancer Father         brain    Breast Cancer Mother     No Known Problems Brother        Review of Systems:  A detailed 10 system ROS is obtained, with pertinent positives as listed above. All others are negative. Diet:  NPO    Objective:     Physical Exam:  Vitals:  Visit Vitals  /61 (BP 1 Location: Right arm)   Pulse 89   Temp 98.2 °F (36.8 °C)   Resp 18   Ht 5' 8\" (1.727 m)   Wt 85.7 kg (189 lb)   SpO2 94%   BMI 28.74 kg/m²     Gen:  Pt is alert, cooperative, no acute distress, sitting in a chair  Skin:  Extremities and face reveal no rashes. HEENT: Sclerae anicteric. Extra-occular muscles are intact. No oral ulcers. No abnormal pigmentation of the lips. The neck is supple. Cardiovascular: Regular rate and rhythm. No murmurs, gallops, or rubs. Respiratory:  Comfortable breathing with no accessory muscle use. Clear breath sounds anteriorly with no wheezes, rales, or rhonchi. GI:  Abdomen nondistended, soft, and nontender. Normal active bowel sounds. No enlargement of the liver or spleen. No masses palpable. Rectal:  Deferred  Musculoskeletal:  No pitting edema of the lower legs. Neurological:  Gross memory appears intact. Patient is alert and oriented. Psychiatric:  Mood appears appropriate with judgement intact.   Lymphatic:  No cervical or supraclavicular adenopathy. Laboratory:    Recent Labs     02/21/20  0551 02/21/20  0028 02/20/20  1809   WBC  --   --  4.9   HGB 8.2* 8.5* 8.9*   HCT 24.7* 25.6* 27.0*   PLT  --   --  205   MCV  --   --  98.9*     --  143   K 3.5  --  3.4*   *  --  110*   CO2 26  --  26   BUN 14  --  13   CREA 0.65*  --  0.76*   CA 7.8*  --  7.9*   MG 2.1  --   --    *  --  135*   AP  --   --  32*   SGOT  --   --  19   ALT  --   --  29   TBILI  --   --  0.2   ALB  --   --  3.0*   TP  --   --  5.7*   PTP  --   --  12.4   INR  --   --  0.9   APTT  --   --  25.9        NUCLEAR MEDICINE GI BLEEDING SCAN 2/16/2020     HISTORY: GI bleed. Rectal bleeding. Diverticulosis status post partial  colectomy.     TECHNIQUE: 22.5 mCi technetium 99m pertechnetate tagged autologous red blood  cells was administered intravenously and imaging of the abdomen was performed.     FINDINGS: Normal activity is present in the blood pool and reticuloendothelial  system. There is no definite GI activity suggestive of active GI bleeding.     IMPRESSION  IMPRESSION: No active GI bleeding demonstrated. Assessment:     Active Problems:    GI bleed (2/15/2020)    Patient is a 47 y.o. male with PMH of etoh abuse, anxiety, cardiomyopathy, HF with EF of 50-55% in 11/2018, diverticulosis s/p colon resection in 2008, GERD, s/p Nissen, HTN, who is seen in consultation at the request of Dr. Nuris Lyman for GI bleeding. He was recently admitted from 2/15-2/18 for likely diverticular bleed. However, bleeding returned on 2/20. He is s/p 1 unit of PRBC in the ED, on arrival.     Pt is known to Dr Michelle Carrera. Last EGD 1/9/2013 for GERD revealed HH. Last colonoscopy 3/9/18 for screening revealed diverticulosis. Recall recommended for 10 years. HGB 8.9 on arrival. 1 unit of PRBC. HGB now 8.2. Likely source of his bleed is diverticular and this was discussed with him and his wife. With his history of colectomy, another potential sources includes anastomotic ulceration.  With recent colonoscopy, there is less concern for colon polyp/colonic neoplasm. Plan:     1. Follow HGB and transfuse as needed. 2. Monitor for GI bleeding  3. Supportive care  4. [id number] Meron Goodman for clear liquid, nothing red diet today. NPO after midnight. 5. Plan for Colonoscopy on 2/22/2020 with Dr. Ming Aldridge. Will prep today. Risk of procedure discussed with patient and wife, including sedation reaction, exacerbation of underlying medical conditions, bleeding, perforation, death. They voice understanding and wish to plan for procedure. Thank you for this kind consultation. We will follow along with you. Koby Boykin NP    Patient is seen and examined in collaboration with Dr. Ming Aldridge. Assessment and plan as per Dr. Ming Aldridge.

## 2020-02-21 NOTE — ED NOTES
TRANSFER - OUT REPORT:    Verbal report given to Roslyn S Bobby Chapa on Leona Stoddard  being transferred to [address] [place] [address] for routine progression of care       Report consisted of patients Situation, Background, Assessment and   Recommendations(SBAR). Information from the following report(s) SBAR, ED Summary and MAR was reviewed with the receiving nurse. Lines:   Peripheral IV 02/20/20 Left Antecubital (Active)       Peripheral IV 02/20/20 Right Wrist (Active)   Site Assessment Clean, dry, & intact 2/20/2020  7:28 PM   Phlebitis Assessment 0 2/20/2020  7:28 PM   Infiltration Assessment 0 2/20/2020  7:28 PM   Dressing Status Clean, dry, & intact 2/20/2020  7:28 PM   Dressing Type Tape;Transparent 2/20/2020  7:28 PM   Hub Color/Line Status Green 2/20/2020  7:28 PM   Alcohol Cap Used Yes 2/20/2020  7:28 PM        Opportunity for questions and clarification was provided.       Patient transported with:   [employer]

## 2020-02-21 NOTE — PROGRESS NOTES
Will hand off pt to LAUREN Aguayo.Q6 H&H. Pt needs consent for colonoscopy. NPO at midnight. VSS. No needs stated at this time.

## 2020-02-21 NOTE — PROGRESS NOTES
02/20/20 2156   Dual Skin Pressure Injury Assessment   Dual Skin Pressure Injury Assessment WDL   Second Care Provider (Based on Facility Policy) Ennis Inch rn   Skin Integumentary   Skin Integumentary (WDL) WDL    Pressure  Injury Documentation No Pressure Injury Noted-Pressure Ulcer Prevention Initiated   Skin Color Appropriate for ethnicity   Skin Condition/Temp Dry;Fragile   Skin Integrity Tattoos (comment)   Turgor Epidermis thin w/ loss of subcut tissue   Hair Growth Present   Varicosities Absent

## 2020-02-21 NOTE — PROGRESS NOTES
END OF SHIFT NOTE:    Intake/Output  02/21 0701 - 02/21 1900  In: 3865 [P.O.:476; I.V.:1068]  Out: 950 [Urine:950]   Voiding: YES  Catheter: NO  Drain:              Stool:  0 occurrences. Stool Assessment  Stool Color: Red (02/20/20 1822)  Stool Appearance: Melena (02/21/20 1155)    Emesis:  0 occurrences. VITAL SIGNS  Patient Vitals for the past 12 hrs:   Temp Pulse Resp BP SpO2   02/21/20 1507 98.3 °F (36.8 °C) 86 18 110/74 100 %   02/21/20 1117 97.8 °F (36.6 °C) 80 18 105/69 100 %   02/21/20 0721 98.2 °F (36.8 °C) 89 18 106/61 94 %       Pain Assessment  Pain 1  Pain Scale 1: Numeric (0 - 10) (02/21/20 1502)  Pain Intensity 1: 0 (02/21/20 1502)  Patient Stated Pain Goal: 0 (02/21/20 1502)  Pain Reassessment 1: Yes (02/21/20 1150)    Ambulating  Yes    Additional Information:   -Decided to hold off on colonoscopy, monitor patient and transfuse Hgb < 7.0, serial H&Hs  -d/c'd IVF  -tolerating diet   -wife at bedside      Shift report given to oncoming nurse at the bedside.     June Umaña RN

## 2020-02-21 NOTE — PROGRESS NOTES
Chart screened by  for discharge planning. No needs identified at this time. Please consult  if any new issues arise. CM will remain available for DC needs. Care Management Interventions  PCP Verified by CM:  Yes  Current Support Network: Own Home, Lives Alone  Confirm Follow Up Transport: Family  Discharge Location  Discharge Placement: Home

## 2020-02-22 LAB
ANION GAP SERPL CALC-SCNC: 6 MMOL/L (ref 7–16)
BASOPHILS # BLD: 0 K/UL (ref 0–0.2)
BASOPHILS NFR BLD: 1 % (ref 0–2)
BUN SERPL-MCNC: 10 MG/DL (ref 6–23)
CALCIUM SERPL-MCNC: 8.2 MG/DL (ref 8.3–10.4)
CHLORIDE SERPL-SCNC: 110 MMOL/L (ref 98–107)
CO2 SERPL-SCNC: 29 MMOL/L (ref 21–32)
CREAT SERPL-MCNC: 0.82 MG/DL (ref 0.8–1.5)
DIFFERENTIAL METHOD BLD: ABNORMAL
EOSINOPHIL # BLD: 0.2 K/UL (ref 0–0.8)
EOSINOPHIL NFR BLD: 4 % (ref 0.5–7.8)
ERYTHROCYTE [DISTWIDTH] IN BLOOD BY AUTOMATED COUNT: 14.6 % (ref 11.9–14.6)
GLUCOSE SERPL-MCNC: 96 MG/DL (ref 65–100)
HCT VFR BLD AUTO: 27.5 % (ref 41.1–50.3)
HGB BLD-MCNC: 9.1 G/DL (ref 13.6–17.2)
IMM GRANULOCYTES # BLD AUTO: 0 K/UL (ref 0–0.5)
IMM GRANULOCYTES NFR BLD AUTO: 0 % (ref 0–5)
LYMPHOCYTES # BLD: 1.3 K/UL (ref 0.5–4.6)
LYMPHOCYTES NFR BLD: 34 % (ref 13–44)
MCH RBC QN AUTO: 32.3 PG (ref 26.1–32.9)
MCHC RBC AUTO-ENTMCNC: 33.1 G/DL (ref 31.4–35)
MCV RBC AUTO: 97.5 FL (ref 79.6–97.8)
MONOCYTES # BLD: 0.2 K/UL (ref 0.1–1.3)
MONOCYTES NFR BLD: 7 % (ref 4–12)
NEUTS SEG # BLD: 2 K/UL (ref 1.7–8.2)
NEUTS SEG NFR BLD: 54 % (ref 43–78)
NRBC # BLD: 0 K/UL (ref 0–0.2)
PLATELET # BLD AUTO: 197 K/UL (ref 150–450)
PMV BLD AUTO: 9.8 FL (ref 9.4–12.3)
POTASSIUM SERPL-SCNC: 3.7 MMOL/L (ref 3.5–5.1)
RBC # BLD AUTO: 2.82 M/UL (ref 4.23–5.6)
SODIUM SERPL-SCNC: 145 MMOL/L (ref 136–145)
WBC # BLD AUTO: 3.6 K/UL (ref 4.3–11.1)

## 2020-02-22 PROCEDURE — C9113 INJ PANTOPRAZOLE SODIUM, VIA: HCPCS | Performed by: INTERNAL MEDICINE

## 2020-02-22 PROCEDURE — 65270000029 HC RM PRIVATE

## 2020-02-22 PROCEDURE — 74011250636 HC RX REV CODE- 250/636: Performed by: INTERNAL MEDICINE

## 2020-02-22 PROCEDURE — 36415 COLL VENOUS BLD VENIPUNCTURE: CPT

## 2020-02-22 PROCEDURE — 74011000250 HC RX REV CODE- 250: Performed by: INTERNAL MEDICINE

## 2020-02-22 PROCEDURE — 85025 COMPLETE CBC W/AUTO DIFF WBC: CPT

## 2020-02-22 PROCEDURE — 80048 BASIC METABOLIC PNL TOTAL CA: CPT

## 2020-02-22 RX ADMIN — PANTOPRAZOLE SODIUM 40 MG: 40 INJECTION, POWDER, FOR SOLUTION INTRAVENOUS at 20:32

## 2020-02-22 RX ADMIN — Medication 10 ML: at 05:30

## 2020-02-22 RX ADMIN — PANTOPRAZOLE SODIUM 40 MG: 40 INJECTION, POWDER, FOR SOLUTION INTRAVENOUS at 07:49

## 2020-02-22 RX ADMIN — Medication 10 ML: at 21:34

## 2020-02-22 NOTE — PROGRESS NOTES
GI DAILY PROGRESS NOTE    Admit Date: 2/20/2020    CC: GI bleed    Subjective:     Patient had not had a BM this AM when I rounded on him. No abd pains.       Medications:  Current Facility-Administered Medications   Medication Dose Route Frequency    0.9% sodium chloride infusion 250 mL  250 mL IntraVENous PRN    0.9% sodium chloride infusion 250 mL  250 mL IntraVENous PRN    pantoprazole (PROTONIX) 40 mg in 0.9% sodium chloride 10 mL injection  40 mg IntraVENous Q12H    sodium chloride (NS) flush 5-40 mL  5-40 mL IntraVENous Q8H    sodium chloride (NS) flush 5-40 mL  5-40 mL IntraVENous PRN    ondansetron (ZOFRAN) injection 4 mg  4 mg IntraVENous Q4H PRN       Objective:   Vitals:  Visit Vitals  /69 (BP 1 Location: Right arm)   Pulse 90   Temp 98 °F (36.7 °C)   Resp 18   Ht 5' 8\" (1.727 m)   Wt 85.7 kg (189 lb)   SpO2 98%   BMI 28.74 kg/m²       Intake/Output:  02/22 0701 - 02/22 1900  In: 240 [P.O.:240]  Out: -   02/20 1901 - 02/22 0700  In: 5240 [P.O.:716; I.V.:3351]  Out: 6862 [Urine:1750]    Exam:    Data Review (Labs):    Recent Results (from the past 24 hour(s))   HGB & HCT    Collection Time: 02/21/20 12:14 PM   Result Value Ref Range    HGB 9.4 (L) 13.6 - 17.2 g/dL    HCT 28.3 (L) 41.1 - 50.3 %   HGB & HCT    Collection Time: 02/21/20  5:45 PM   Result Value Ref Range    HGB 9.2 (L) 13.6 - 17.2 g/dL    HCT 28.1 (L) 41.1 - 80.2 %   METABOLIC PANEL, BASIC    Collection Time: 02/22/20  9:08 AM   Result Value Ref Range    Sodium 145 136 - 145 mmol/L    Potassium 3.7 3.5 - 5.1 mmol/L    Chloride 110 (H) 98 - 107 mmol/L    CO2 29 21 - 32 mmol/L    Anion gap 6 (L) 7 - 16 mmol/L    Glucose 96 65 - 100 mg/dL    BUN 10 6 - 23 MG/DL    Creatinine 0.82 0.8 - 1.5 MG/DL    GFR est AA >60 >60 ml/min/1.73m2    GFR est non-AA >60 >60 ml/min/1.73m2    Calcium 8.2 (L) 8.3 - 10.4 MG/DL   CBC WITH AUTOMATED DIFF    Collection Time: 02/22/20  9:08 AM   Result Value Ref Range    WBC 3.6 (L) 4.3 - 11.1 K/uL    RBC 2.82 (L) 4.23 - 5.6 M/uL    HGB 9.1 (L) 13.6 - 17.2 g/dL    HCT 27.5 (L) 41.1 - 50.3 %    MCV 97.5 79.6 - 97.8 FL    MCH 32.3 26.1 - 32.9 PG    MCHC 33.1 31.4 - 35.0 g/dL    RDW 14.6 11.9 - 14.6 %    PLATELET 479 706 - 417 K/uL    MPV 9.8 9.4 - 12.3 FL    ABSOLUTE NRBC 0.00 0.0 - 0.2 K/uL    DF AUTOMATED      NEUTROPHILS 54 43 - 78 %    LYMPHOCYTES 34 13 - 44 %    MONOCYTES 7 4.0 - 12.0 %    EOSINOPHILS 4 0.5 - 7.8 %    BASOPHILS 1 0.0 - 2.0 %    IMMATURE GRANULOCYTES 0 0.0 - 5.0 %    ABS. NEUTROPHILS 2.0 1.7 - 8.2 K/UL    ABS. LYMPHOCYTES 1.3 0.5 - 4.6 K/UL    ABS. MONOCYTES 0.2 0.1 - 1.3 K/UL    ABS. EOSINOPHILS 0.2 0.0 - 0.8 K/UL    ABS. BASOPHILS 0.0 0.0 - 0.2 K/UL    ABS. IMM. GRANS. 0.0 0.0 - 0.5 K/UL       Assessment:     Active Problems:    GI bleed (2/15/2020)        Plan:     Lower GI bleed. On CL diet. If he has a BM today and less bloody, then OK to advance diet. Otherwise, if still fresh red blood, then continue CL diet. At least his Hgb stable. Per my consult note, patient elected to not proceed with colonoscopy.

## 2020-02-22 NOTE — PROGRESS NOTES
Hospitalist Note     Admit Date:  2020  5:55 PM   Name:  Keyshawn Greene   Age:  47 y.o.  :  1965   MRN:  [id number]   PCP:  Josiah Sullivan NP  Treatment Team: Attending Provider: Tosha Tralyor MD; Consulting Provider: Carlitos Mathur MD; Care Manager: Domenico Swartz RN; Utilization Review: Marifer Mac RN    HPI/Subjective:       Mr. Winslow is a 46 yo male with PMH of GI bleed, diverticulitis s/p partial colectomy, HTN, GERD s/p Nissen fundoplication, admitted with recurrent GI bleed. He was discharged 20 after admit for GI bleed s/p negative NM bleed scan and GI consult. He additionally had syncope though related to acute blood loss anemia. Upon re-admit, he had syncope in the ED with associated hypotension. HGB was 8.9 and received 1 unit PRBC. GI re consulted without current  plans for colonoscopy. Discharge plans pending to home with wife.       20 wife present, tolerant to clears, no recent BM, no further bleeding, no abd pain, no further syncope or palpitations, no dizziness       Objective:     Patient Vitals for the past 24 hrs:   Temp Pulse Resp BP SpO2   20 0700 98 °F (36.7 °C) 90 18 107/69 98 %   20 0325 97.9 °F (36.6 °C) 76 18 100/61 96 %   20 2330 97.4 °F (36.3 °C) 82 20 118/70 96 %   20 1930 97.6 °F (36.4 °C) 77 18 123/72 98 %   20 1507 98.3 °F (36.8 °C) 86 18 110/74 100 %   20 1117 97.8 °F (36.6 °C) 80 18 105/69 100 %     Oxygen Therapy  O2 Sat (%): 98 % (20 0700)  Pulse via Oximetry: 83 beats per minute (20 2101)  O2 Device: Room air (20 0806)    Estimated body mass index is 28.74 kg/m² as calculated from the following:    Height as of this encounter: 5' 8\" (1.727 m). Weight as of this encounter: 85.7 kg (189 lb).       Intake/Output Summary (Last 24 hours) at 2020 1105  Last data filed at 2020 0843  Gross per 24 hour   Intake 1784 ml   Output 550 ml   Net 1234 ml       *Note that automatically entered I/Os may not be accurate; dependent on patient compliance with collection and accurate  by techs. General:    Well nourished. Alert. No distress   CV:   RRR. No murmur, rub, or gallop. No edema  Lungs:   CTAB. No wheezing, rhonchi, or rales. Abdomen:   Soft, nontender, nondistended. Decreased BS  Extremities: Warm and dry. Skin:     No rashes or jaundice. Neuro:  No gross focal deficits    Data Review:  I have reviewed all labs, meds, and studies from the last 24 hours:    Recent Results (from the past 24 hour(s))   HGB & HCT    Collection Time: 02/21/20 12:14 PM   Result Value Ref Range    HGB 9.4 (L) 13.6 - 17.2 g/dL    HCT 28.3 (L) 41.1 - 50.3 %   HGB & HCT    Collection Time: 02/21/20  5:45 PM   Result Value Ref Range    HGB 9.2 (L) 13.6 - 17.2 g/dL    HCT 28.1 (L) 41.1 - 43.1 %   METABOLIC PANEL, BASIC    Collection Time: 02/22/20  9:08 AM   Result Value Ref Range    Sodium 145 136 - 145 mmol/L    Potassium 3.7 3.5 - 5.1 mmol/L    Chloride 110 (H) 98 - 107 mmol/L    CO2 29 21 - 32 mmol/L    Anion gap 6 (L) 7 - 16 mmol/L    Glucose 96 65 - 100 mg/dL    BUN 10 6 - 23 MG/DL    Creatinine 0.82 0.8 - 1.5 MG/DL    GFR est AA >60 >60 ml/min/1.73m2    GFR est non-AA >60 >60 ml/min/1.73m2    Calcium 8.2 (L) 8.3 - 10.4 MG/DL   CBC WITH AUTOMATED DIFF    Collection Time: 02/22/20  9:08 AM   Result Value Ref Range    WBC 3.6 (L) 4.3 - 11.1 K/uL    RBC 2.82 (L) 4.23 - 5.6 M/uL    HGB 9.1 (L) 13.6 - 17.2 g/dL    HCT 27.5 (L) 41.1 - 50.3 %    MCV 97.5 79.6 - 97.8 FL    MCH 32.3 26.1 - 32.9 PG    MCHC 33.1 31.4 - 35.0 g/dL    RDW 14.6 11.9 - 14.6 %    PLATELET 288 516 - 696 K/uL    MPV 9.8 9.4 - 12.3 FL    ABSOLUTE NRBC 0.00 0.0 - 0.2 K/uL    DF AUTOMATED      NEUTROPHILS 54 43 - 78 %    LYMPHOCYTES 34 13 - 44 %    MONOCYTES 7 4.0 - 12.0 %    EOSINOPHILS 4 0.5 - 7.8 %    BASOPHILS 1 0.0 - 2.0 %    IMMATURE GRANULOCYTES 0 0.0 - 5.0 %    ABS. NEUTROPHILS 2.0 1.7 - 8.2 K/UL    ABS. LYMPHOCYTES 1.3 0.5 - 4.6 K/UL    ABS. MONOCYTES 0.2 0.1 - 1.3 K/UL    ABS. EOSINOPHILS 0.2 0.0 - 0.8 K/UL    ABS. BASOPHILS 0.0 0.0 - 0.2 K/UL    ABS. IMM. GRANS. 0.0 0.0 - 0.5 K/UL        All Micro Results     None          Current Meds:  Current Facility-Administered Medications   Medication Dose Route Frequency    0.9% sodium chloride infusion 250 mL  250 mL IntraVENous PRN    0.9% sodium chloride infusion 250 mL  250 mL IntraVENous PRN    pantoprazole (PROTONIX) 40 mg in 0.9% sodium chloride 10 mL injection  40 mg IntraVENous Q12H    sodium chloride (NS) flush 5-40 mL  5-40 mL IntraVENous Q8H    sodium chloride (NS) flush 5-40 mL  5-40 mL IntraVENous PRN    ondansetron (ZOFRAN) injection 4 mg  4 mg IntraVENous Q4H PRN       Other Studies:  No results found for this visit on 02/20/20. No results found.     Assessment and Plan:     Hospital Problems as of 2/22/2020 Date Reviewed: 2/15/2020          Codes Class Noted - Resolved POA    GI bleed ICD-10-CM: K92.2  ICD-9-CM: 578.9  2/15/2020 - Present Unknown              Plan:  · GI bleed/ anemia: appreciate GI,  trending labs, transfuse HGB < 7.0, monitor for recurrent bleeding, currently on clear liquids  · Syncope: possibly hypotension induced/ vasovagal related    DC planning/Dispo:  pending      Diet:  DIET CLEAR LIQUID  DVT ppx:  SCD    Signed:  Omar Manriquez MD

## 2020-02-22 NOTE — PROGRESS NOTES
Problem: Falls - Risk of  Goal: *Absence of Falls  Description  Document Cyn Olivares Fall Risk and appropriate interventions in the flowsheet.   Outcome: Progressing Towards Goal  Note: Fall Risk Interventions:  Mobility Interventions: Patient to call before getting OOB         Medication Interventions: Patient to call before getting OOB    Elimination Interventions: Call light in reach    History of Falls Interventions: Door open when patient unattended

## 2020-02-22 NOTE — PROGRESS NOTES
END OF SHIFT NOTE:    Intake/Output  02/21 1901 - 02/22 0700  In: -   Out: 400 [Urine:400]   Voiding: YES  Catheter: NO  Drain:              Stool:  0 occurrences. Stool Assessment  Stool Color: Red (02/20/20 1822)  Stool Appearance: Melena (02/21/20 1940)    Emesis:  0 occurrences. VITAL SIGNS  Patient Vitals for the past 12 hrs:   Temp Pulse Resp BP SpO2   02/22/20 0325 97.9 °F (36.6 °C) 76 18 100/61 96 %   02/21/20 2330 97.4 °F (36.3 °C) 82 20 118/70 96 %   02/21/20 1930 97.6 °F (36.4 °C) 77 18 123/72 98 %       Pain Assessment  Pain 1  Pain Scale 1: Numeric (0 - 10) (02/22/20 0240)  Pain Intensity 1: 0 (02/22/20 0240)  Patient Stated Pain Goal: 0 (02/22/20 0240)  Pain Reassessment 1: Yes (02/21/20 1150)    Ambulating  Yes    Additional Information: VSS. Afebrile. Pt rested throughout night. No needs voiced at this time    Shift report given to oncoming nurse at the bedside.     Aminta Aldana, RN

## 2020-02-22 NOTE — PROGRESS NOTES
Bedside shift report given to oncoming RN. Pt with 3 small to medium sized formed dark brown BM's. Pt with scant amount of red blood with last BM. Tolerating gi soft diet.

## 2020-02-23 LAB
ANION GAP SERPL CALC-SCNC: 5 MMOL/L (ref 7–16)
BASOPHILS # BLD: 0 K/UL (ref 0–0.2)
BASOPHILS NFR BLD: 1 % (ref 0–2)
BUN SERPL-MCNC: 13 MG/DL (ref 6–23)
CALCIUM SERPL-MCNC: 7.7 MG/DL (ref 8.3–10.4)
CHLORIDE SERPL-SCNC: 113 MMOL/L (ref 98–107)
CO2 SERPL-SCNC: 27 MMOL/L (ref 21–32)
CREAT SERPL-MCNC: 0.74 MG/DL (ref 0.8–1.5)
DIFFERENTIAL METHOD BLD: ABNORMAL
EOSINOPHIL # BLD: 0.2 K/UL (ref 0–0.8)
EOSINOPHIL NFR BLD: 4 % (ref 0.5–7.8)
ERYTHROCYTE [DISTWIDTH] IN BLOOD BY AUTOMATED COUNT: 14.1 % (ref 11.9–14.6)
GLUCOSE SERPL-MCNC: 118 MG/DL (ref 65–100)
HCT VFR BLD AUTO: 25.5 % (ref 41.1–50.3)
HCT VFR BLD AUTO: 27 % (ref 41.1–50.3)
HGB BLD-MCNC: 8.3 G/DL (ref 13.6–17.2)
HGB BLD-MCNC: 8.7 G/DL (ref 13.6–17.2)
IMM GRANULOCYTES # BLD AUTO: 0 K/UL (ref 0–0.5)
IMM GRANULOCYTES NFR BLD AUTO: 0 % (ref 0–5)
LYMPHOCYTES # BLD: 1.6 K/UL (ref 0.5–4.6)
LYMPHOCYTES NFR BLD: 45 % (ref 13–44)
MCH RBC QN AUTO: 31.6 PG (ref 26.1–32.9)
MCHC RBC AUTO-ENTMCNC: 32.5 G/DL (ref 31.4–35)
MCV RBC AUTO: 97 FL (ref 79.6–97.8)
MONOCYTES # BLD: 0.2 K/UL (ref 0.1–1.3)
MONOCYTES NFR BLD: 7 % (ref 4–12)
NEUTS SEG # BLD: 1.5 K/UL (ref 1.7–8.2)
NEUTS SEG NFR BLD: 43 % (ref 43–78)
NRBC # BLD: 0 K/UL (ref 0–0.2)
PLATELET # BLD AUTO: 193 K/UL (ref 150–450)
PMV BLD AUTO: 9.4 FL (ref 9.4–12.3)
POTASSIUM SERPL-SCNC: 3.4 MMOL/L (ref 3.5–5.1)
RBC # BLD AUTO: 2.63 M/UL (ref 4.23–5.6)
SODIUM SERPL-SCNC: 145 MMOL/L (ref 136–145)
WBC # BLD AUTO: 3.5 K/UL (ref 4.3–11.1)

## 2020-02-23 PROCEDURE — 74011250636 HC RX REV CODE- 250/636: Performed by: INTERNAL MEDICINE

## 2020-02-23 PROCEDURE — 85025 COMPLETE CBC W/AUTO DIFF WBC: CPT

## 2020-02-23 PROCEDURE — 80048 BASIC METABOLIC PNL TOTAL CA: CPT

## 2020-02-23 PROCEDURE — C9113 INJ PANTOPRAZOLE SODIUM, VIA: HCPCS | Performed by: INTERNAL MEDICINE

## 2020-02-23 PROCEDURE — 74011000250 HC RX REV CODE- 250: Performed by: INTERNAL MEDICINE

## 2020-02-23 PROCEDURE — 85018 HEMOGLOBIN: CPT

## 2020-02-23 PROCEDURE — 36415 COLL VENOUS BLD VENIPUNCTURE: CPT

## 2020-02-23 PROCEDURE — 65270000029 HC RM PRIVATE

## 2020-02-23 PROCEDURE — 74011250637 HC RX REV CODE- 250/637: Performed by: INTERNAL MEDICINE

## 2020-02-23 RX ORDER — POLYETHYLENE GLYCOL 3350 17 G/17G
238 POWDER, FOR SOLUTION ORAL ONCE
Status: DISCONTINUED | OUTPATIENT
Start: 2020-02-23 | End: 2020-02-23

## 2020-02-23 RX ORDER — BISACODYL 5 MG
20 TABLET, DELAYED RELEASE (ENTERIC COATED) ORAL DAILY PRN
Status: COMPLETED | OUTPATIENT
Start: 2020-02-23 | End: 2020-02-23

## 2020-02-23 RX ORDER — POLYETHYLENE GLYCOL 3350 17 G/17G
238 POWDER, FOR SOLUTION ORAL ONCE
Status: COMPLETED | OUTPATIENT
Start: 2020-02-23 | End: 2020-02-23

## 2020-02-23 RX ORDER — POTASSIUM CHLORIDE 20 MEQ/1
40 TABLET, EXTENDED RELEASE ORAL
Status: COMPLETED | OUTPATIENT
Start: 2020-02-23 | End: 2020-02-23

## 2020-02-23 RX ORDER — ACETAMINOPHEN 325 MG/1
650 TABLET ORAL
Status: DISCONTINUED | OUTPATIENT
Start: 2020-02-23 | End: 2020-02-24 | Stop reason: HOSPADM

## 2020-02-23 RX ADMIN — Medication 10 ML: at 13:50

## 2020-02-23 RX ADMIN — ACETAMINOPHEN 650 MG: 325 TABLET, FILM COATED ORAL at 10:42

## 2020-02-23 RX ADMIN — BISACODYL 20 MG: 5 TABLET, COATED ORAL at 14:34

## 2020-02-23 RX ADMIN — Medication 10 ML: at 05:12

## 2020-02-23 RX ADMIN — Medication 10 ML: at 21:07

## 2020-02-23 RX ADMIN — PANTOPRAZOLE SODIUM 40 MG: 40 INJECTION, POWDER, FOR SOLUTION INTRAVENOUS at 08:30

## 2020-02-23 RX ADMIN — POLYETHYLENE GLYCOL 3350 238 G: 17 POWDER, FOR SOLUTION ORAL at 17:46

## 2020-02-23 RX ADMIN — POTASSIUM CHLORIDE 40 MEQ: 20 TABLET, EXTENDED RELEASE ORAL at 12:33

## 2020-02-23 RX ADMIN — PANTOPRAZOLE SODIUM 40 MG: 40 INJECTION, POWDER, FOR SOLUTION INTRAVENOUS at 21:07

## 2020-02-23 NOTE — PROGRESS NOTES
Spent a few minutes with the wife  Nick Richardson, staff Jonelle lowry [address], [facility]  /   [email or web address]

## 2020-02-23 NOTE — PROGRESS NOTES
Hospitalist Note     Admit Date:  2020  5:55 PM   Name:  Trent Montague   Age:  47 y.o.  :  1965   MRN:  [id number]   PCP:  Kay Garrison NP  Treatment Team: Attending Provider: Jose Lorenzo MD; Consulting Provider: Jes Corona MD; Care Manager: Maya Gonzalez RN; Utilization Review: Jose Manuel Mclean RN; Primary Nurse: Boo Price RN    HPI/Subjective:       Mr. Aurelia Fisher is a 46 yo male with PMH of GI bleed, diverticulitis s/p partial colectomy, HTN, GERD s/p Nissen fundoplication, admitted with recurrent GI bleed. He was discharged 20 after admit for GI bleed s/p negative NM bleed scan and GI consult. He additionally had syncope though related to acute blood loss anemia. Upon re-admit, he had syncope in the ED with associated hypotension. HGB was 8.9 and received 1 unit PRBC. GI re consulted with current  plans for colonoscopy 20. Discharge plans pending to home with wife. 20 had recurrent bleed, on clears, ok with colonoscopy tomorrow, no dyspnea or edema, no dizziness      Objective:     Patient Vitals for the past 24 hrs:   Temp Pulse Resp BP SpO2   20 1156 98.1 °F (36.7 °C) 88 16 104/78 99 %   20 0801 97.7 °F (36.5 °C) 82 16 108/70 96 %   20 0311 97.7 °F (36.5 °C) 87 18 97/62 97 %   20 2336  84  93/64    20 2305 97.9 °F (36.6 °C) 67 18 (!) 88/54 97 %   20 1900 98.4 °F (36.9 °C) 84 18 103/65 97 %   20 1430 98 °F (36.7 °C) (!) 103 18 102/62 97 %     Oxygen Therapy  O2 Sat (%): 99 % (20 1156)  Pulse via Oximetry: 83 beats per minute (20 2101)  O2 Device: Room air (20 0311)    Estimated body mass index is 28.74 kg/m² as calculated from the following:    Height as of this encounter: 5' 8\" (1.727 m). Weight as of this encounter: 85.7 kg (189 lb).       Intake/Output Summary (Last 24 hours) at 2020 1226  Last data filed at 2020 2137  Gross per 24 hour   Intake 480 ml   Output  Net 480 ml       *Note that automatically entered I/Os may not be accurate; dependent on patient compliance with collection and accurate  by techs. General:    Well nourished. Alert. No distress   CV:   RRR. No murmur, rub, or gallop. No edema  Lungs:   CTAB. No wheezing, rhonchi, or rales. Abdomen:   Soft, nontender, nondistended. Decreased BS  Extremities: Warm and dry. Skin:     No rashes or jaundice. Neuro:  No gross focal deficits    Data Review:  I have reviewed all labs, meds, and studies from the last 24 hours:    Recent Results (from the past 24 hour(s))   METABOLIC PANEL, BASIC    Collection Time: 02/23/20  3:51 AM   Result Value Ref Range    Sodium 145 136 - 145 mmol/L    Potassium 3.4 (L) 3.5 - 5.1 mmol/L    Chloride 113 (H) 98 - 107 mmol/L    CO2 27 21 - 32 mmol/L    Anion gap 5 (L) 7 - 16 mmol/L    Glucose 118 (H) 65 - 100 mg/dL    BUN 13 6 - 23 MG/DL    Creatinine 0.74 (L) 0.8 - 1.5 MG/DL    GFR est AA >60 >60 ml/min/1.73m2    GFR est non-AA >60 >60 ml/min/1.73m2    Calcium 7.7 (L) 8.3 - 10.4 MG/DL   CBC WITH AUTOMATED DIFF    Collection Time: 02/23/20  3:51 AM   Result Value Ref Range    WBC 3.5 (L) 4.3 - 11.1 K/uL    RBC 2.63 (L) 4.23 - 5.6 M/uL    HGB 8.3 (L) 13.6 - 17.2 g/dL    HCT 25.5 (L) 41.1 - 50.3 %    MCV 97.0 79.6 - 97.8 FL    MCH 31.6 26.1 - 32.9 PG    MCHC 32.5 31.4 - 35.0 g/dL    RDW 14.1 11.9 - 14.6 %    PLATELET 815 616 - 379 K/uL    MPV 9.4 9.4 - 12.3 FL    ABSOLUTE NRBC 0.00 0.0 - 0.2 K/uL    DF AUTOMATED      NEUTROPHILS 43 43 - 78 %    LYMPHOCYTES 45 (H) 13 - 44 %    MONOCYTES 7 4.0 - 12.0 %    EOSINOPHILS 4 0.5 - 7.8 %    BASOPHILS 1 0.0 - 2.0 %    IMMATURE GRANULOCYTES 0 0.0 - 5.0 %    ABS. NEUTROPHILS 1.5 (L) 1.7 - 8.2 K/UL    ABS. LYMPHOCYTES 1.6 0.5 - 4.6 K/UL    ABS. MONOCYTES 0.2 0.1 - 1.3 K/UL    ABS. EOSINOPHILS 0.2 0.0 - 0.8 K/UL    ABS. BASOPHILS 0.0 0.0 - 0.2 K/UL    ABS. IMM.  GRANS. 0.0 0.0 - 0.5 K/UL        All Micro Results     None Current Meds:  Current Facility-Administered Medications   Medication Dose Route Frequency    polyethylene glycol (MIRALAX) powder 238 g  238 g Oral ONCE    bisacodyL (DULCOLAX) tablet 20 mg  20 mg Oral DAILY PRN    acetaminophen (TYLENOL) tablet 650 mg  650 mg Oral Q6H PRN    potassium chloride (K-DUR, KLOR-CON) SR tablet 40 mEq  40 mEq Oral NOW    pantoprazole (PROTONIX) 40 mg in 0.9% sodium chloride 10 mL injection  40 mg IntraVENous Q12H    sodium chloride (NS) flush 5-40 mL  5-40 mL IntraVENous Q8H    sodium chloride (NS) flush 5-40 mL  5-40 mL IntraVENous PRN    ondansetron (ZOFRAN) injection 4 mg  4 mg IntraVENous Q4H PRN       Other Studies:  No results found for this visit on 02/20/20. No results found.     Assessment and Plan:     Hospital Problems as of 2/23/2020 Date Reviewed: 2/15/2020          Codes Class Noted - Resolved POA    GI bleed ICD-10-CM: K92.2  ICD-9-CM: 578.9  2/15/2020 - Present Unknown              Plan:  · GI bleed/ anemia:  ·  appreciate GI for colonoscopy tomorrow  ·   trending labs, transfuse HGB < 7.0  ·  monitor for recurrent bleeding  ·  currently on clear liquids      · Syncope:   · possibly hypotension induced/ vasovagal related      · Hypokalemia:  · Replace and repeat     DC planning/Dispo:  pending      Diet:  DIET GI SOFT  DIET NPO  DVT ppx:  SCD    Signed:  Brook Scheuermann, MD

## 2020-02-23 NOTE — PROGRESS NOTES
Problem: Falls - Risk of  Goal: *Absence of Falls  Description  Document Zuleika Quigley Fall Risk and appropriate interventions in the flowsheet.   Outcome: Progressing Towards Goal  Note: Fall Risk Interventions:  Mobility Interventions: (P) Strengthening exercises (ROM-active/passive)         Medication Interventions: (P) Teach patient to arise slowly    Elimination Interventions: (P) Call light in reach, Elevated toilet seat, Toilet paper/wipes in reach, Toileting schedule/hourly rounds    History of Falls Interventions: (P) Room close to nurse's station         Problem: Patient Education: Go to Patient Education Activity  Goal: Patient/Family Education  Outcome: Progressing Towards Goal

## 2020-02-23 NOTE — PROGRESS NOTES
GI DAILY PROGRESS NOTE    Admit Date: 2/20/2020    CC: GI bleed    Subjective:     Patient ate breakfast without abd pains. Just before I saw the patient, he passes a maroon BM (I saw in the toilet water). Medications:  Current Facility-Administered Medications   Medication Dose Route Frequency    polyethylene glycol (MIRALAX) powder 238 g  238 g Oral ONCE    bisacodyL (DULCOLAX) tablet 20 mg  20 mg Oral DAILY PRN    pantoprazole (PROTONIX) 40 mg in 0.9% sodium chloride 10 mL injection  40 mg IntraVENous Q12H    sodium chloride (NS) flush 5-40 mL  5-40 mL IntraVENous Q8H    sodium chloride (NS) flush 5-40 mL  5-40 mL IntraVENous PRN    ondansetron (ZOFRAN) injection 4 mg  4 mg IntraVENous Q4H PRN       Objective:   Vitals:  Visit Vitals  /70   Pulse 82   Temp 97.7 °F (36.5 °C)   Resp 16   Ht 5' 8\" (1.727 m)   Wt 85.7 kg (189 lb)   SpO2 96%   BMI 28.74 kg/m²       Intake/Output:  No intake/output data recorded.   02/21 1901 - 02/23 0700  In: 960 [P.O.:960]  Out: 400 [Urine:400]    Exam:    Data Review (Labs):    Recent Results (from the past 24 hour(s))   METABOLIC PANEL, BASIC    Collection Time: 02/22/20  9:08 AM   Result Value Ref Range    Sodium 145 136 - 145 mmol/L    Potassium 3.7 3.5 - 5.1 mmol/L    Chloride 110 (H) 98 - 107 mmol/L    CO2 29 21 - 32 mmol/L    Anion gap 6 (L) 7 - 16 mmol/L    Glucose 96 65 - 100 mg/dL    BUN 10 6 - 23 MG/DL    Creatinine 0.82 0.8 - 1.5 MG/DL    GFR est AA >60 >60 ml/min/1.73m2    GFR est non-AA >60 >60 ml/min/1.73m2    Calcium 8.2 (L) 8.3 - 10.4 MG/DL   CBC WITH AUTOMATED DIFF    Collection Time: 02/22/20  9:08 AM   Result Value Ref Range    WBC 3.6 (L) 4.3 - 11.1 K/uL    RBC 2.82 (L) 4.23 - 5.6 M/uL    HGB 9.1 (L) 13.6 - 17.2 g/dL    HCT 27.5 (L) 41.1 - 50.3 %    MCV 97.5 79.6 - 97.8 FL    MCH 32.3 26.1 - 32.9 PG    MCHC 33.1 31.4 - 35.0 g/dL    RDW 14.6 11.9 - 14.6 %    PLATELET 111 137 - 313 K/uL    MPV 9.8 9.4 - 12.3 FL    ABSOLUTE NRBC 0.00 0.0 - 0.2 K/uL DF AUTOMATED      NEUTROPHILS 54 43 - 78 %    LYMPHOCYTES 34 13 - 44 %    MONOCYTES 7 4.0 - 12.0 %    EOSINOPHILS 4 0.5 - 7.8 %    BASOPHILS 1 0.0 - 2.0 %    IMMATURE GRANULOCYTES 0 0.0 - 5.0 %    ABS. NEUTROPHILS 2.0 1.7 - 8.2 K/UL    ABS. LYMPHOCYTES 1.3 0.5 - 4.6 K/UL    ABS. MONOCYTES 0.2 0.1 - 1.3 K/UL    ABS. EOSINOPHILS 0.2 0.0 - 0.8 K/UL    ABS. BASOPHILS 0.0 0.0 - 0.2 K/UL    ABS. IMM. GRANS. 0.0 0.0 - 0.5 K/UL   METABOLIC PANEL, BASIC    Collection Time: 02/23/20  3:51 AM   Result Value Ref Range    Sodium 145 136 - 145 mmol/L    Potassium 3.4 (L) 3.5 - 5.1 mmol/L    Chloride 113 (H) 98 - 107 mmol/L    CO2 27 21 - 32 mmol/L    Anion gap 5 (L) 7 - 16 mmol/L    Glucose 118 (H) 65 - 100 mg/dL    BUN 13 6 - 23 MG/DL    Creatinine 0.74 (L) 0.8 - 1.5 MG/DL    GFR est AA >60 >60 ml/min/1.73m2    GFR est non-AA >60 >60 ml/min/1.73m2    Calcium 7.7 (L) 8.3 - 10.4 MG/DL   CBC WITH AUTOMATED DIFF    Collection Time: 02/23/20  3:51 AM   Result Value Ref Range    WBC 3.5 (L) 4.3 - 11.1 K/uL    RBC 2.63 (L) 4.23 - 5.6 M/uL    HGB 8.3 (L) 13.6 - 17.2 g/dL    HCT 25.5 (L) 41.1 - 50.3 %    MCV 97.0 79.6 - 97.8 FL    MCH 31.6 26.1 - 32.9 PG    MCHC 32.5 31.4 - 35.0 g/dL    RDW 14.1 11.9 - 14.6 %    PLATELET 662 052 - 317 K/uL    MPV 9.4 9.4 - 12.3 FL    ABSOLUTE NRBC 0.00 0.0 - 0.2 K/uL    DF AUTOMATED      NEUTROPHILS 43 43 - 78 %    LYMPHOCYTES 45 (H) 13 - 44 %    MONOCYTES 7 4.0 - 12.0 %    EOSINOPHILS 4 0.5 - 7.8 %    BASOPHILS 1 0.0 - 2.0 %    IMMATURE GRANULOCYTES 0 0.0 - 5.0 %    ABS. NEUTROPHILS 1.5 (L) 1.7 - 8.2 K/UL    ABS. LYMPHOCYTES 1.6 0.5 - 4.6 K/UL    ABS. MONOCYTES 0.2 0.1 - 1.3 K/UL    ABS. EOSINOPHILS 0.2 0.0 - 0.8 K/UL    ABS. BASOPHILS 0.0 0.0 - 0.2 K/UL    ABS. IMM. GRANS. 0.0 0.0 - 0.5 K/UL       Assessment:     Active Problems:    GI bleed (2/15/2020)        Plan:     Lower GI bleed. Given that he's now had recurrent GI bleed (this is the third time), will prep today for colonoscopy tomorrow.

## 2020-02-23 NOTE — PROGRESS NOTES
END OF SHIFT NOTE:    Intake/Output  02/22 1901 - 02/23 0700  In: 240 [P.O.:240]  Out: -    Voiding: YES  Catheter: NO  Drain:              Stool:  0 occurrences. Stool Assessment  Stool Color: Brown(small amount of bright red blood) (02/22/20 1543)  Stool Appearance: Formed (02/22/20 1543)  Stool Amount: Small (02/22/20 1543)  Stool Source/Status: Rectum (02/22/20 1543)    Emesis:  0 occurrences. VITAL SIGNS  Patient Vitals for the past 12 hrs:   Temp Pulse Resp BP SpO2   02/23/20 0311 97.7 °F (36.5 °C) 87 18 97/62 97 %   02/22/20 2336  84  93/64    02/22/20 2305 97.9 °F (36.6 °C) 67 18 (!) 88/54 97 %   02/22/20 1900 98.4 °F (36.9 °C) 84 18 103/65 97 %       Pain Assessment  Pain 1  Pain Scale 1: Numeric (0 - 10) (02/23/20 0200)  Pain Intensity 1: 0 (02/23/20 0200)  Patient Stated Pain Goal: 0 (02/23/20 0200)  Pain Reassessment 1: Yes (02/21/20 1150)    Ambulating  Yes    Additional Information:   Had no BM/GI bleeding noted/reported overnight. Tolerating GI soft diet. Anticipating DC today. Shift report given to oncoming nurse Elicia Escamilla at the bedside.     Gibson Ganser, RN

## 2020-02-24 ENCOUNTER — ANESTHESIA EVENT (OUTPATIENT)
Dept: ENDOSCOPY | Age: 55
DRG: 378 | End: 2020-02-24
Payer: COMMERCIAL

## 2020-02-24 ENCOUNTER — ANESTHESIA (OUTPATIENT)
Dept: ENDOSCOPY | Age: 55
DRG: 378 | End: 2020-02-24
Payer: COMMERCIAL

## 2020-02-24 VITALS
OXYGEN SATURATION: 98 % | RESPIRATION RATE: 14 BRPM | BODY MASS INDEX: 28.64 KG/M2 | TEMPERATURE: 97.8 F | HEART RATE: 70 BPM | WEIGHT: 189 LBS | HEIGHT: 68 IN | DIASTOLIC BLOOD PRESSURE: 57 MMHG | SYSTOLIC BLOOD PRESSURE: 105 MMHG

## 2020-02-24 LAB
ANION GAP SERPL CALC-SCNC: 6 MMOL/L (ref 7–16)
BASOPHILS # BLD: 0 K/UL (ref 0–0.2)
BASOPHILS NFR BLD: 1 % (ref 0–2)
BUN SERPL-MCNC: 8 MG/DL (ref 6–23)
CALCIUM SERPL-MCNC: 8 MG/DL (ref 8.3–10.4)
CHLORIDE SERPL-SCNC: 115 MMOL/L (ref 98–107)
CO2 SERPL-SCNC: 25 MMOL/L (ref 21–32)
CREAT SERPL-MCNC: 0.74 MG/DL (ref 0.8–1.5)
DIFFERENTIAL METHOD BLD: ABNORMAL
EOSINOPHIL # BLD: 0.1 K/UL (ref 0–0.8)
EOSINOPHIL NFR BLD: 3 % (ref 0.5–7.8)
ERYTHROCYTE [DISTWIDTH] IN BLOOD BY AUTOMATED COUNT: 14 % (ref 11.9–14.6)
GLUCOSE SERPL-MCNC: 91 MG/DL (ref 65–100)
HCT VFR BLD AUTO: 25.1 % (ref 41.1–50.3)
HCT VFR BLD AUTO: 25.1 % (ref 41.1–50.3)
HCT VFR BLD AUTO: 26.1 % (ref 41.1–50.3)
HGB BLD-MCNC: 8.2 G/DL (ref 13.6–17.2)
HGB BLD-MCNC: 8.4 G/DL (ref 13.6–17.2)
HGB BLD-MCNC: 8.6 G/DL (ref 13.6–17.2)
IMM GRANULOCYTES # BLD AUTO: 0 K/UL (ref 0–0.5)
IMM GRANULOCYTES NFR BLD AUTO: 0 % (ref 0–5)
LYMPHOCYTES # BLD: 1.9 K/UL (ref 0.5–4.6)
LYMPHOCYTES NFR BLD: 48 % (ref 13–44)
MCH RBC QN AUTO: 32 PG (ref 26.1–32.9)
MCHC RBC AUTO-ENTMCNC: 32.7 G/DL (ref 31.4–35)
MCV RBC AUTO: 98 FL (ref 79.6–97.8)
MONOCYTES # BLD: 0.3 K/UL (ref 0.1–1.3)
MONOCYTES NFR BLD: 7 % (ref 4–12)
NEUTS SEG # BLD: 1.6 K/UL (ref 1.7–8.2)
NEUTS SEG NFR BLD: 41 % (ref 43–78)
NRBC # BLD: 0 K/UL (ref 0–0.2)
PLATELET # BLD AUTO: 188 K/UL (ref 150–450)
PMV BLD AUTO: 9.3 FL (ref 9.4–12.3)
POTASSIUM SERPL-SCNC: 3.7 MMOL/L (ref 3.5–5.1)
RBC # BLD AUTO: 2.56 M/UL (ref 4.23–5.6)
SODIUM SERPL-SCNC: 146 MMOL/L (ref 136–145)
WBC # BLD AUTO: 3.9 K/UL (ref 4.3–11.1)

## 2020-02-24 PROCEDURE — 74011250636 HC RX REV CODE- 250/636: Performed by: INTERNAL MEDICINE

## 2020-02-24 PROCEDURE — 85025 COMPLETE CBC W/AUTO DIFF WBC: CPT

## 2020-02-24 PROCEDURE — 74011000250 HC RX REV CODE- 250: Performed by: INTERNAL MEDICINE

## 2020-02-24 PROCEDURE — 36415 COLL VENOUS BLD VENIPUNCTURE: CPT

## 2020-02-24 PROCEDURE — C9113 INJ PANTOPRAZOLE SODIUM, VIA: HCPCS | Performed by: INTERNAL MEDICINE

## 2020-02-24 PROCEDURE — 76040000025: Performed by: INTERNAL MEDICINE

## 2020-02-24 PROCEDURE — 85018 HEMOGLOBIN: CPT

## 2020-02-24 PROCEDURE — 0DJD8ZZ INSPECTION OF LOWER INTESTINAL TRACT, VIA NATURAL OR ARTIFICIAL OPENING ENDOSCOPIC: ICD-10-PCS | Performed by: INTERNAL MEDICINE

## 2020-02-24 PROCEDURE — 76060000032 HC ANESTHESIA 0.5 TO 1 HR: Performed by: INTERNAL MEDICINE

## 2020-02-24 PROCEDURE — 80048 BASIC METABOLIC PNL TOTAL CA: CPT

## 2020-02-24 PROCEDURE — 74011250636 HC RX REV CODE- 250/636: Performed by: NURSE ANESTHETIST, CERTIFIED REGISTERED

## 2020-02-24 PROCEDURE — 74011000250 HC RX REV CODE- 250: Performed by: NURSE ANESTHETIST, CERTIFIED REGISTERED

## 2020-02-24 RX ORDER — SODIUM CHLORIDE, SODIUM LACTATE, POTASSIUM CHLORIDE, CALCIUM CHLORIDE 600; 310; 30; 20 MG/100ML; MG/100ML; MG/100ML; MG/100ML
1000 INJECTION, SOLUTION INTRAVENOUS CONTINUOUS
Status: DISCONTINUED | OUTPATIENT
Start: 2020-02-24 | End: 2020-02-24 | Stop reason: HOSPADM

## 2020-02-24 RX ORDER — PROPOFOL 10 MG/ML
INJECTION, EMULSION INTRAVENOUS
Status: DISCONTINUED | OUTPATIENT
Start: 2020-02-24 | End: 2020-02-24 | Stop reason: HOSPADM

## 2020-02-24 RX ORDER — PROPOFOL 10 MG/ML
INJECTION, EMULSION INTRAVENOUS AS NEEDED
Status: DISCONTINUED | OUTPATIENT
Start: 2020-02-24 | End: 2020-02-24 | Stop reason: HOSPADM

## 2020-02-24 RX ORDER — LIDOCAINE HYDROCHLORIDE 20 MG/ML
INJECTION, SOLUTION EPIDURAL; INFILTRATION; INTRACAUDAL; PERINEURAL AS NEEDED
Status: DISCONTINUED | OUTPATIENT
Start: 2020-02-24 | End: 2020-02-24 | Stop reason: HOSPADM

## 2020-02-24 RX ADMIN — LIDOCAINE HYDROCHLORIDE 40 MG: 20 INJECTION, SOLUTION EPIDURAL; INFILTRATION; INTRACAUDAL; PERINEURAL at 13:34

## 2020-02-24 RX ADMIN — Medication 10 ML: at 14:00

## 2020-02-24 RX ADMIN — PHENYLEPHRINE HYDROCHLORIDE 100 MCG: 10 INJECTION INTRAVENOUS at 13:56

## 2020-02-24 RX ADMIN — PHENYLEPHRINE HYDROCHLORIDE 100 MCG: 10 INJECTION INTRAVENOUS at 14:00

## 2020-02-24 RX ADMIN — PHENYLEPHRINE HYDROCHLORIDE 100 MCG: 10 INJECTION INTRAVENOUS at 13:49

## 2020-02-24 RX ADMIN — PROPOFOL 200 MCG/KG/MIN: 10 INJECTION, EMULSION INTRAVENOUS at 13:42

## 2020-02-24 RX ADMIN — PROPOFOL 50 MG: 10 INJECTION, EMULSION INTRAVENOUS at 13:42

## 2020-02-24 RX ADMIN — PANTOPRAZOLE SODIUM 40 MG: 40 INJECTION, POWDER, FOR SOLUTION INTRAVENOUS at 08:37

## 2020-02-24 RX ADMIN — Medication 10 ML: at 05:03

## 2020-02-24 RX ADMIN — SODIUM CHLORIDE, SODIUM LACTATE, POTASSIUM CHLORIDE, AND CALCIUM CHLORIDE 1000 ML: 600; 310; 30; 20 INJECTION, SOLUTION INTRAVENOUS at 13:11

## 2020-02-24 NOTE — DISCHARGE INSTRUCTIONS
DISCHARGE SUMMARY from Nurse    PATIENT INSTRUCTIONS:    After general anesthesia or intravenous sedation, for 24 hours or while taking prescription Narcotics:  · Limit your activities  · Do not drive and operate hazardous machinery  · Do not make important personal or business decisions  · Do  not drink alcoholic beverages  · If you have not urinated within 8 hours after discharge, please contact your surgeon on call. Report the following to your surgeon:  · Excessive pain, swelling, redness or odor of or around the surgical area  · Temperature over 100.5  · Nausea and vomiting lasting longer than 4 hours or if unable to take medications  · Any signs of decreased circulation or nerve impairment to extremity: change in color, persistent  numbness, tingling, coldness or increase pain  · Any questions    What to do at Home:  Recommended activity: Activity as tolerated and Activity as tolerated and no driving for today    If you experience any of the following symptoms abdominal pain, increased bleeding, abdominal bruising, please follow up with Primary Care Physician or Emergency Dept. *  Please give a list of your current medications to your Primary Care Provider. *  Please update this list whenever your medications are discontinued, doses are      changed, or new medications (including over-the-counter products) are added. *  Please carry medication information at all times in case of emergency situations. These are general instructions for a healthy lifestyle:    No smoking/ No tobacco products/ Avoid exposure to second hand smoke  Surgeon General's Warning:  Quitting smoking now greatly reduces serious risk to your health.     Obesity, smoking, and sedentary lifestyle greatly increases your risk for illness    A healthy diet, regular physical exercise & weight monitoring are important for maintaining a healthy lifestyle    You may be retaining fluid if you have a history of heart failure or if you experience any of the following symptoms:  Weight gain of 3 pounds or more overnight or 5 pounds in a week, increased swelling in our hands or feet or shortness of breath while lying flat in bed. Please call your doctor as soon as you notice any of these symptoms; do not wait until your next office visit. The discharge information has been reviewed with the patient. The patient verbalized understanding. Discharge medications reviewed with the patient and appropriate educational materials and side effects teaching were provided.   ___________________________________________________________________________________________________________________________________

## 2020-02-24 NOTE — PROGRESS NOTES
TRANSFER - IN REPORT:    Verbal report received from Harika Roblero RN on Shi Plate  being received from [id number] for ordered procedure      Report consisted of patients Situation, Background, Assessment and   Recommendations(SBAR). Information from the following report(s) SBAR, Kardex, Intake/Output, MAR, Recent Results and Procedure Verification was reviewed with the receiving nurse. Opportunity for questions and clarification was provided.

## 2020-02-24 NOTE — H&P
Date of Surgery Update:  Rachael Steele was seen and examined. History and physical has been reviewed. The patient has been examined.  There have been no significant clinical changes since the completion of the originally dated History and Physical.    Signed By: James Hernandez MD     February 24, 2020 1:27 PM

## 2020-02-24 NOTE — ANESTHESIA PREPROCEDURE EVALUATION
Relevant Problems   No relevant active problems       Anesthetic History   No history of anesthetic complications            Review of Systems / Medical History  Patient summary reviewed and pertinent labs reviewed    Pulmonary  Within defined limits                 Neuro/Psych   Within defined limits           Cardiovascular    Hypertension              Exercise tolerance: >4 METS  Comments: H/o cardiomyopathy from ETOH, most recent echo with EF 50%   GI/Hepatic/Renal     GERD      Hiatal hernia     Endo/Other        Anemia (requiring blood transfusion)     Other Findings   Comments: ETOH abuse           Physical Exam    Airway  Mallampati: I  TM Distance: 4 - 6 cm  Neck ROM: normal range of motion   Mouth opening: Normal     Cardiovascular    Rhythm: regular  Rate: normal         Dental  No notable dental hx       Pulmonary  Breath sounds clear to auscultation               Abdominal         Other Findings            Anesthetic Plan    ASA: 3  Anesthesia type: total IV anesthesia          Induction: Intravenous  Anesthetic plan and risks discussed with: Patient

## 2020-02-24 NOTE — PROGRESS NOTES
END OF SHIFT NOTE:    Intake/Output  No intake/output data recorded. Voiding: YES  Catheter: NO  Drain:              Stool:  0 occurrences. Stool Assessment  Stool Color: Brown(small amount of bright red blood) (02/22/20 1543)  Stool Appearance: Soft (02/23/20 1930)  Stool Amount: Small (02/22/20 1543)  Stool Source/Status: Rectum (02/22/20 1543)    Emesis:  0 occurrences. VITAL SIGNS  Patient Vitals for the past 12 hrs:   Temp Pulse Resp BP SpO2   02/24/20 0315 97.7 °F (36.5 °C) 88 18 104/71 98 %   02/23/20 2320 98.1 °F (36.7 °C) 87 18 120/80 99 %   02/23/20 1959 97.9 °F (36.6 °C) 84 18 114/78 100 %       Pain Assessment  Pain 1  Pain Scale 1: Numeric (0 - 10) (02/24/20 0230)  Pain Intensity 1: 0 (02/24/20 0230)  Patient Stated Pain Goal: 0 (02/24/20 0230)  Pain Reassessment 1: Yes (02/23/20 1130)  Pain Location 1: Hip (02/23/20 1042)  Pain Orientation 1: Right (02/23/20 1042)  Pain Description 1: Aching;Constant (02/23/20 1042)  Pain Intervention(s) 1: Medication (see MAR) (02/23/20 1042)    Ambulating  Yes    Additional Information: VSS. Afebrile. Pt npo at midnight for colonoscopy. Pt still getting q8 h/h. No needs voiced at this time    Shift report given to oncoming nurse at the bedside.     Ayaan Membreno, RN

## 2020-02-24 NOTE — PROGRESS NOTES
END OF SHIFT NOTE:    Intake/Output  No intake/output data recorded. Voiding: YES  Catheter: NO  Drain:              Stool:  4 occurrences. Stool Assessment  Stool Color: Brown(small amount of bright red blood) (02/22/20 1543)  Stool Appearance: Soft;Watery (02/23/20 9949)  Stool Amount: Small (02/22/20 1543)  Stool Source/Status: Rectum (02/22/20 1543)    Emesis:  0 occurrences. VITAL SIGNS  Patient Vitals for the past 12 hrs:   Temp Pulse Resp BP SpO2   02/23/20 1514 97.3 °F (36.3 °C) 79 18 114/74 100 %   02/23/20 1156 98.1 °F (36.7 °C) 88 16 104/78 99 %   02/23/20 0801 97.7 °F (36.5 °C) 82 16 108/70 96 %       Pain Assessment  Pain 1  Pain Scale 1: Numeric (0 - 10) (02/23/20 1930)  Pain Intensity 1: 0 (02/23/20 1930)  Patient Stated Pain Goal: 0 (02/23/20 1930)  Pain Reassessment 1: Yes (02/23/20 1130)  Pain Location 1: Hip (02/23/20 1042)  Pain Orientation 1: Right (02/23/20 1042)  Pain Description 1: Aching;Constant (02/23/20 1042)  Pain Intervention(s) 1: Medication (see MAR) (02/23/20 1042)    Ambulating  Yes    Additional Information:   -Colonoscopy tomorrow, NPO at [facility], Consent signed and in the chart  -Hgb 8.7 at 1530, H&H q8hrs  -K+3.4, 40 mEq PO K+ given   -wife at bedside  -Tylenol given for R hip discomfort     Shift report given to oncoming nurse at the bedside.     Stacey Barbosa, LAUREN

## 2020-02-24 NOTE — PROGRESS NOTES
TRANSFER - OUT REPORT:    Verbal report given to Terri RN(name) on Rachael Steele  being transferred to Endoscopy(unit) for ordered procedure       Report consisted of patients Situation, Background, Assessment and   Recommendations(SBAR). Information from the following report(s) SBAR, Intake/Output, MAR and Recent Results was reviewed with the receiving nurse. Lines:   Peripheral IV 02/20/20 Left Forearm (Active)   Site Assessment Drainage (comment) 2/24/2020  2:30 AM   Phlebitis Assessment 0 2/24/2020  2:30 AM   Infiltration Assessment 0 2/24/2020  2:30 AM   Dressing Status Clean, dry, & intact 2/24/2020  2:30 AM   Dressing Type Transparent;Tape 2/24/2020  2:30 AM   Hub Color/Line Status Patent; Flushed 2/24/2020  2:30 AM   Action Taken Other (comment) 2/23/2020  2:00 AM   Alcohol Cap Used No 2/24/2020  2:30 AM        Opportunity for questions and clarification was provided.       Patient transported with:   [employer]

## 2020-02-24 NOTE — ROUTINE PROCESS
TRANSFER - OUT REPORT: 
 
Verbal report given to Mahsa Oliver RN(name) on Rachael Steele  being transferred to room 517(unit) for routine post - op Report consisted of patients Situation, Background, Assessment and  
Recommendations(SBAR). Information from the following report(s) SBAR was reviewed with the receiving nurse. Lines:  
Peripheral IV 02/24/20 Right;Upper Arm (Active) Site Assessment Clean, dry, & intact 2/24/2020  1:00 PM  
Phlebitis Assessment 0 2/24/2020  1:00 PM  
Infiltration Assessment 0 2/24/2020  1:00 PM  
Dressing Status Clean, dry, & intact 2/24/2020  1:00 PM  
Dressing Type Transparent;Tape 2/24/2020  1:00 PM  
Hub Color/Line Status Pink;Patent; Infusing;Flushed 2/24/2020  1:00 PM  
  
 
Opportunity for questions and clarification was provided. Patient transported with: 
 [employer] Wife at bedside

## 2020-02-24 NOTE — DISCHARGE SUMMARY
Hospitalist Discharge Summary     Admit Date:  2020  5:55 PM   Name:  Magdalena Garsia   Age:  47 y.o.  :  1965   MRN:  [id number]   PCP:  Grisel Ventura NP  Treatment Team: Attending Provider: Aure Bird MD; Utilization Review: Radha Lewis RN; Primary Nurse: Brayan Rico; Staff Nurse: Elizabeth Rosen RN; Consulting Provider: Marcella Pelletier MD; Care Manager: Toni Connell RN    Problem List for this Hospitalization:  Hospital Problems as of 2020 Date Reviewed: 2/15/2020          Codes Class Noted - Resolved POA    * (Principal) GI bleed ICD-10-CM: K92.2  ICD-9-CM: 578.9  2/15/2020 - Present Unknown                  Hospital Course:    Mr. Darcie Berry is a 46 yo male with PMH of GI bleed, diverticulitis s/p partial colectomy, HTN, GERD s/p Nissen fundoplication, admitted with recurrent GI bleed. He was discharged 20 after admit for GI bleed s/p negative NM bleed scan and GI consult. He additionally had syncope though related to acute blood loss anemia. Upon re-admit, he had syncope in the ED with associated hypotension. HGB was 8.9 and received 1 unit PRBC. GI re consulted with colonoscopy 20 showing diverticulosis and small internal hemorrhoids. recommendations are for high fiber diet. Discharge plans are to home with wife. Disposition: Home or Self Care  Activity: Activity as tolerated  Diet: DIET CARDIAC  Code Status: Full Code    Follow up instructions, discharge meds at bottom of this note. Plan was discussed with patient and wife. All questions answered. Patient was stable at time of discharge. Patient will call a physician or return if any concerns. Discharge summary and other info was sent to PCP electronically via \"Comm Mgt\" link in [facility], if possible. Diagnostic Imaging/Tests:   Nm Acute Gi Bleed Scan    Result Date: 2020  NUCLEAR MEDICINE GI BLEEDING SCAN 2020 HISTORY: GI bleed. Rectal bleeding.  Diverticulosis status post partial colectomy. TECHNIQUE: 22.5 mCi technetium 99m pertechnetate tagged autologous red blood cells was administered intravenously and imaging of the abdomen was performed. FINDINGS: Normal activity is present in the blood pool and reticuloendothelial system. There is no definite GI activity suggestive of active GI bleeding. IMPRESSION: No active GI bleeding demonstrated. Ct Head Wo Cont    Result Date: 2/16/2020  HEAD CT WITHOUT CONTRAST  2/16/2020 HISTORY:   confusion patient reports subjectively  ; slurred speech TECHNIQUE: Noncontrast axial images were obtained through the brain. All CT scans at this facility used dose modulation, interactive reconstruction and/or weight based dosing when appropriate to reduce radiation dose to as low as reasonably achievable. COMPARISON: None FINDINGS: There is no acute intracranial hemorrhage, significant mass effect or CT evidence of acute large artery territorial infarction. Please note that a hyperacute infarct or small vessel infarct may not be apparent on initial CT imaging. There is no hydrocephalus , intra-axial mass or abnormal extra-axial fluid collection. There are no displaced skull fractures. The mastoid air cells and paranasal sinuses are clear where imaged. IMPRESSION: No acute findings. Ct Abd Pelv W Cont    Result Date: 2/15/2020  EXAM: CT abdomen and pelvis with IV contrast. INDICATION: Rectal bleeding. COMPARISON: Prior CT abdomen and pelvis on July 24, 2016. TECHNIQUE: Axial CT images of the abdomen and pelvis were obtained after the intravenous injection of 100 mL Isovue 370 CT contrast. Radiation dose reduction techniques were used for this study. Our CT scanners use one or all of the following:  Automated exposure control, adjustment of the mA or kV according to patient size, iterative reconstruction. FINDINGS: - Liver: Within normal limits. - Gallbladder and bile ducts: Within normal limits. - Spleen: Within normal limits. - Urinary tract: The previous 4.5 cm right kidney cyst has collapsed or been removed. There is a smaller approximate 1 cm cyst in the lower pole of the right kidney. The left kidney and the urinary bladder are unremarkable. No hydronephrosis is identified. - Adrenals: Left adrenal gland thickening is unchanged. The right adrenal gland remains within normal limits. - Pancreas: Within normal limits. - Gastrointestinal tract: There is mild colonic diverticulosis, as well as a sigmoid colon suture anastomosis. There is no evidence of acute diverticulitis or bowel obstruction. The small bowel, rectum and appendix are normal. There is a small to moderate sized hiatal hernia, with adjacent surgical clips. - Retroperitoneum: Within normal limits. - Peritoneal cavity and abdominal wall: Within normal limits. - Pelvis: Within normal limits. - Spine/bones: No acute process. - Other comments: None. IMPRESSION: 1. No acute process. 2. Colonic diverticulosis and a sigmoid colon suture anastomosis. There is also a small to moderate sized hiatal hernia, with adjacent surgical clips. 3. Interval collapse or removal of the previous 4.5 cm right kidney cyst.        Echocardiogram results:  No results found for this visit on 02/20/20.     Procedures done this admission:  Procedure(s):  COLONOSCOPY ROOM 517    All Micro Results     None          Labs: Results:       BMP, Mg, Phos Recent Labs     02/24/20  0416 02/23/20  0351 02/22/20  0908   * 145 145   K 3.7 3.4* 3.7   * 113* 110*   CO2 25 27 29   AGAP 6* 5* 6*   BUN 8 13 10   CREA 0.74* 0.74* 0.82   CA 8.0* 7.7* 8.2*   GLU 91 118* 96      CBC Recent Labs     02/24/20  1158 02/24/20  0416 02/23/20  2354  02/23/20  0351 02/22/20  0908   WBC  --  3.9*  --   --  3.5* 3.6*   RBC  --  2.56*  --   --  2.63* 2.82*   HGB 8.6* 8.2* 8.4*   < > 8.3* 9.1*   HCT 26.1* 25.1* 25.1*   < > 25.5* 27.5*   PLT  --  188  --   --  193 197   GRANS  --  41*  --   --  43 54   LYMPH  --  48*  -- --  45* 34   EOS  --  3  --   --  4 4   MONOS  --  7  --   --  7 7   BASOS  --  1  --   --  1 1   IG  --  0  --   --  0 0   ANEU  --  1.6*  --   --  1.5* 2.0   ABL  --  1.9  --   --  1.6 1.3   MIKE  --  0.1  --   --  0.2 0.2   ABM  --  0.3  --   --  0.2 0.2   ABB  --  0.0  --   --  0.0 0.0   AIG  --  0.0  --   --  0.0 0.0    < > = values in this interval not displayed. LFT No results for input(s): SGOT, ALT, TBIL, AP, TP, ALB, GLOB, AGRAT, GPT in the last 72 hours.    Cardiac Testing Lab Results   Component Value Date/Time    BNP 14 02/22/2012 04:19 PM      Coagulation Tests Lab Results   Component Value Date/Time    Prothrombin time 12.4 02/20/2020 06:09 PM    Prothrombin time 13.0 02/15/2020 12:14 AM    Prothrombin time 9.6 02/10/2017 04:20 PM    INR 0.9 02/20/2020 06:09 PM    INR 1.0 02/15/2020 12:14 AM    INR 0.9 02/10/2017 04:20 PM    aPTT 25.9 02/20/2020 06:09 PM    aPTT 25.0 02/15/2020 12:14 AM    aPTT 23.8 02/10/2017 04:20 PM      A1c No results found for: HBA1C, HGBE8, WEH9QUGY   Lipid Panel Lab Results   Component Value Date/Time    Cholesterol, total 193 10/04/2019 08:22 AM    HDL Cholesterol 52 10/04/2019 08:22 AM    LDL, calculated 127 (H) 10/04/2019 08:22 AM    VLDL, calculated 14 10/04/2019 08:22 AM    Triglyceride 72 10/04/2019 08:22 AM      Thyroid Panel Lab Results   Component Value Date/Time    TSH 1.420 10/04/2019 08:22 AM    TSH 2.140 10/03/2018 07:43 AM    T4, Total 8.5 02/22/2012 04:19 PM        Most Recent UA Lab Results   Component Value Date/Time    Color YELLOW 04/22/2008 02:45 PM    Appearance CLEAR 04/22/2008 02:45 PM    pH (UA) 7.0 04/22/2008 02:45 PM    Protein NEGATIVE  04/22/2008 02:45 PM    Glucose NEGATIVE  04/22/2008 02:45 PM    Ketone 40 (A) 04/22/2008 02:45 PM    Bilirubin NEGATIVE  04/22/2008 02:45 PM    Blood NEGATIVE  04/22/2008 02:45 PM    Urobilinogen 0.2 04/22/2008 02:45 PM    Nitrites NEGATIVE  04/22/2008 02:45 PM    Leukocyte Esterase NEGATIVE  04/22/2008 02:45 PM WBC None seen 10/10/2019 09:33 AM    RBC 0-2 10/10/2019 09:33 AM    Epithelial cells None seen 10/10/2019 09:33 AM    Bacteria Few 10/10/2019 09:33 AM    Mucus Present 10/10/2019 09:33 AM        Allergies   Allergen Reactions    Pcn [Penicillins] Hives     Immunization History   Administered Date(s) Administered    Tdap 11/10/2016       All Labs from Last 24 Hrs:  Recent Results (from the past 24 hour(s))   HGB & HCT    Collection Time: 02/23/20 11:54 PM   Result Value Ref Range    HGB 8.4 (L) 13.6 - 17.2 g/dL    HCT 25.1 (L) 41.1 - 38.0 %   METABOLIC PANEL, BASIC    Collection Time: 02/24/20  4:16 AM   Result Value Ref Range    Sodium 146 (H) 136 - 145 mmol/L    Potassium 3.7 3.5 - 5.1 mmol/L    Chloride 115 (H) 98 - 107 mmol/L    CO2 25 21 - 32 mmol/L    Anion gap 6 (L) 7 - 16 mmol/L    Glucose 91 65 - 100 mg/dL    BUN 8 6 - 23 MG/DL    Creatinine 0.74 (L) 0.8 - 1.5 MG/DL    GFR est AA >60 >60 ml/min/1.73m2    GFR est non-AA >60 >60 ml/min/1.73m2    Calcium 8.0 (L) 8.3 - 10.4 MG/DL   CBC WITH AUTOMATED DIFF    Collection Time: 02/24/20  4:16 AM   Result Value Ref Range    WBC 3.9 (L) 4.3 - 11.1 K/uL    RBC 2.56 (L) 4.23 - 5.6 M/uL    HGB 8.2 (L) 13.6 - 17.2 g/dL    HCT 25.1 (L) 41.1 - 50.3 %    MCV 98.0 (H) 79.6 - 97.8 FL    MCH 32.0 26.1 - 32.9 PG    MCHC 32.7 31.4 - 35.0 g/dL    RDW 14.0 11.9 - 14.6 %    PLATELET 654 629 - 164 K/uL    MPV 9.3 (L) 9.4 - 12.3 FL    ABSOLUTE NRBC 0.00 0.0 - 0.2 K/uL    DF AUTOMATED      NEUTROPHILS 41 (L) 43 - 78 %    LYMPHOCYTES 48 (H) 13 - 44 %    MONOCYTES 7 4.0 - 12.0 %    EOSINOPHILS 3 0.5 - 7.8 %    BASOPHILS 1 0.0 - 2.0 %    IMMATURE GRANULOCYTES 0 0.0 - 5.0 %    ABS. NEUTROPHILS 1.6 (L) 1.7 - 8.2 K/UL    ABS. LYMPHOCYTES 1.9 0.5 - 4.6 K/UL    ABS. MONOCYTES 0.3 0.1 - 1.3 K/UL    ABS. EOSINOPHILS 0.1 0.0 - 0.8 K/UL    ABS. BASOPHILS 0.0 0.0 - 0.2 K/UL    ABS. IMM.  GRANS. 0.0 0.0 - 0.5 K/UL   HGB & HCT    Collection Time: 02/24/20 11:58 AM   Result Value Ref Range    HGB 8.6 (L) 13.6 - 17.2 g/dL    HCT 26.1 (L) 41.1 - 50.3 %       Current Med List in Hospital:   Current Facility-Administered Medications   Medication Dose Route Frequency    acetaminophen (TYLENOL) tablet 650 mg  650 mg Oral Q6H PRN    pantoprazole (PROTONIX) 40 mg in 0.9% sodium chloride 10 mL injection  40 mg IntraVENous Q12H    sodium chloride (NS) flush 5-40 mL  5-40 mL IntraVENous Q8H    sodium chloride (NS) flush 5-40 mL  5-40 mL IntraVENous PRN    ondansetron (ZOFRAN) injection 4 mg  4 mg IntraVENous Q4H PRN       Discharge Exam:  Patient Vitals for the past 24 hrs:   Temp Pulse Resp BP SpO2   02/24/20 1500 97.8 °F (36.6 °C) 70 14 105/57 98 %   02/24/20 1436  92 14 109/69 97 %   02/24/20 1421  73 14 108/65 96 %   02/24/20 1408 98 °F (36.7 °C) 80 12 98/55 96 %   02/24/20 1407  90 14 98/55 98 %   02/24/20 1300  65 18 108/60 99 %   02/24/20 1246 98.1 °F (36.7 °C) 73 18 102/58 98 %   02/24/20 1115 97.7 °F (36.5 °C) 72 19 108/74    02/24/20 0754 97.7 °F (36.5 °C) (!) 105 18 99/60 97 %   02/24/20 0315 97.7 °F (36.5 °C) 88 18 104/71 98 %   02/23/20 2320 98.1 °F (36.7 °C) 87 18 120/80 99 %   02/23/20 1959 97.9 °F (36.6 °C) 84 18 114/78 100 %     Oxygen Therapy  O2 Sat (%): 98 % (02/24/20 1500)  Pulse via Oximetry: 82 beats per minute (02/24/20 1436)  O2 Device: Room air (02/24/20 1436)  O2 Flow Rate (L/min): 2 l/min (02/24/20 1407)    Estimated body mass index is 28.74 kg/m² as calculated from the following:    Height as of this encounter: 5' 8\" (1.727 m). Weight as of this encounter: 85.7 kg (189 lb). Intake/Output Summary (Last 24 hours) at 2/24/2020 1647  Last data filed at 2/24/2020 1400  Gross per 24 hour   Intake 2810 ml   Output 0 ml   Net 2810 ml       *Note that automatically entered I/Os may not be accurate; dependent on patient compliance with collection and accurate  by assistants. General:    Well nourished. Alert. No distress   CV:   Regular rate and rhythm.   No murmur, rub, or gallop. No edema  Lungs:  Clear to auscultation bilaterally. No wheezing, rhonchi, or rales. anterior  Abdomen: Soft, nontender, nondistended. Bowel sounds normal.   Extremities: Warm and dry. Neurologic:  grossly intact  Skin:     No rashes or jaundice. Psych:  Normal mood and affect. Discharge Info:   Current Discharge Medication List      CONTINUE these medications which have NOT CHANGED    Details   cyanocobalamin 1,000 mcg tablet Take 1,000 mcg by mouth daily. ascorbic acid (VITAMIN C) 1,000 mg tablet Take 1,000 mg by mouth every morning.        multivitamin (ONE A DAY) tablet Take 1 Tab by mouth every morning. Follow Up Orders: Follow-up Appointments   Procedures    FOLLOW UP VISIT Appointment in: Two Weeks 2 weeks GI assoc     2 weeks GI assoc     Standing Status:   Standing     Number of Occurrences:   1     Order Specific Question:   Appointment in     Answer: Two Weeks       Follow-up Information     Follow up With Specialties Details Why Contact Info    Yadira Ochoa NP Nurse Practitioner   [address] [place] [address]  [phone]            Time spent in patient discharge planning and coordination 30 minutes.     Signed:  Fortunato Castano MD

## 2020-02-24 NOTE — PROGRESS NOTES
MD to bedside to speak with the pt and his wife, pt did not want anything to drink, he was offered ice chips, awaiting transport

## 2020-02-24 NOTE — PROGRESS NOTES
Patient has been discharged at this time. Patient discharge complete. Patient's IV removed, follow up instructions given, follow up appointments covered, and patient is being taken downstairs in wheelchair to be discharged home at this time.

## 2020-02-24 NOTE — PROCEDURES
Colonoscopy Procedure Note    Indications: LGI bleeding    Anesthesia/Sedation: MAC IV     Pre-Procedure Physical:  Current Facility-Administered Medications   Medication Dose Route Frequency    lactated Ringers infusion 1,000 mL  1,000 mL IntraVENous CONTINUOUS    acetaminophen (TYLENOL) tablet 650 mg  650 mg Oral Q6H PRN    pantoprazole (PROTONIX) 40 mg in 0.9% sodium chloride 10 mL injection  40 mg IntraVENous Q12H    sodium chloride (NS) flush 5-40 mL  5-40 mL IntraVENous Q8H    sodium chloride (NS) flush 5-40 mL  5-40 mL IntraVENous PRN    ondansetron (ZOFRAN) injection 4 mg  4 mg IntraVENous Q4H PRN      Pcn [penicillins]    Patient Vitals for the past 8 hrs:   BP Temp Pulse Resp SpO2 Height Weight   02/24/20 1408 98/55 98 °F (36.7 °C) 80 12 96 %     02/24/20 1407 98/55  90 14 98 %     02/24/20 1300 108/60  65 18 99 %     02/24/20 1246 102/58 98.1 °F (36.7 °C) 73 18 98 % 5' 8\" (1.727 m) 85.7 kg (189 lb)   02/24/20 1115 108/74 97.7 °F (36.5 °C) 72 19      02/24/20 0754 99/60 97.7 °F (36.5 °C) (!) 105 18 97 %         Exam:    Airway: clear   Heart: normal S1and S2    Lungs: clear bilateral  Abdomen: soft, nontender, bowel sounds present and normal in all quads   Mental Status: awake, alert and oriented to person, place and time        Procedure Details      Informed consent was obtained for the procedure, including sedation. Risks of perforation, hemorrhage, adverse drug reaction and aspiration were discussed. The patient was placed in the left lateral decubitus position. Based on the pre-procedure assessment, including review of the patient's medical history, medications, allergies, and review of systems, he had been deemed to be an appropriate candidate for conscious sedation; he was therefore sedated with the medications listed below. The patient was monitored continuously with ECG tracing, pulse oximetry, blood pressure monitoring, and direct observations.       A rectal examination was performed. The colonoscope was inserted into the rectum and advanced under direct vision to the cecum. The quality of the colonic preparation was good. A careful inspection was made as the colonoscope was withdrawn, including a retroflexed view of the rectum; findings and interventions are described below. Appropriate photodocumentation was obtained. Findings: No active bleeding, diverticulosis noted throughout but mostly in the left side. Exact location of recent bleeding not identified. No Small internal hemorrhoids noted. Specimens: None    Estimated Blood Loss: 0 cc           Complications: None; patient tolerated the procedure well. Attending Attestation: I performed the procedure. Impression:   No active bleeding. Diverticulosis and small internal hemorrhoids. Recommendations: High fiber diet. Advance diet.     Thomas Hernandez MD

## 2020-02-25 NOTE — ANESTHESIA POSTPROCEDURE EVALUATION
Procedure(s):  COLONOSCOPY ROOM 517. No value filed.     Anesthesia Post Evaluation        Patient location during evaluation: PACU  Patient participation: complete - patient participated  Level of consciousness: awake and alert  Pain management: adequate  Airway patency: patent  Anesthetic complications: no  Cardiovascular status: acceptable  Respiratory status: acceptable  Hydration status: acceptable  Post anesthesia nausea and vomiting:  none      Vitals Value Taken Time   /57 2/24/2020  3:00 PM   Temp 36.6 °C (97.8 °F) 2/24/2020  3:00 PM   Pulse 70 2/24/2020  3:00 PM   Resp 14 2/24/2020  3:00 PM   SpO2 98 % 2/24/2020  3:00 PM

## 2020-08-19 ENCOUNTER — HOSPITAL ENCOUNTER (OUTPATIENT)
Dept: PHYSICAL THERAPY | Age: 55
Discharge: HOME | End: 2020-08-19
Payer: COMMERCIAL

## 2020-08-19 DIAGNOSIS — M25.511 ACUTE PAIN OF RIGHT SHOULDER: ICD-10-CM

## 2020-08-19 PROCEDURE — 97110 THERAPEUTIC EXERCISES: CPT

## 2020-08-19 PROCEDURE — 97161 PT EVAL LOW COMPLEX 20 MIN: CPT

## 2020-08-19 PROCEDURE — 97140 MANUAL THERAPY 1/> REGIONS: CPT

## 2020-08-19 NOTE — THERAPY EVALUATION
Ramya Arora  : 1965      Payor: Mariam Vanegas / Plan: Janine Hodgkin / Product Type: Commerical /    [address] at [address] [facility] [address], [facility], [address] [place] [address]  [phone]   [phone]              OUTPATIENT PHYSICAL THERAPY:Initial Assessment: 2020    ICD-10: Treatment Diagnosis:   Pain in Right Shoulder (M25.511)  Impingement syndrome of Right Shoulder (M75.41)              Precautions/Allergies:   Pcn [penicillins]   Fall Risk Score: 1 (? 5 = High Risk)  MD Orders: Eval and Treat  MEDICAL/REFERRING DIAGNOSIS:  Acute pain of right shoulder [M25.511]   DATE OF ONSET: 3 weeks ago  REFERRING PHYSICIAN: Jessica Jacobo NP  RETURN PHYSICIAN APPOINTMENT: TBD by patient      INITIAL ASSESSMENT:   Mr. Javid Godoy presents to physical therapy with c/o R scapular/shoulder pain when working at his desk on the computer. He presents with abnormal posture, decreased strength, ROM, joint mobility, functional mobility, and lack of knowledge of proper body mechanics. These S/S are consistent with R shoulder pain and impingement syndrome of R shoulder. Patient will benefit from skilled physical therapy for manual therapeutic techniques (as appropriate), therapeutic exercises and activities, neuromuscular re-education, and comprehensive home exercises program to address current impairments and functional limitations. Ramya Arora will benefit from skilled PT (medically necessary) in order to address above deficits affecting participation in basic ADLs and overall functional tolerance. PROBLEM LIST (Impacting functional limitations):  · Decreased Strength  · Decreased ADL/Functional Activities  · Increased Pain  · Decreased Activity Tolerance  · Increased Shortness of Breath  · Decreased Flexibility/Joint Mobility  · Decreased [place] with Home Exercise Program INTERVENTIONS PLANNED:  1. Cold  2. Family Education  3.  Home Exercise Program (HEP)  4. Manual Therapy  5. Neuromuscular Re-education/Strengthening  6. Range of Motion (ROM)  7. Therapeutic Activites  8. Therapeutic Exercise/Strengthening  9. Transfer Training  10. Ultrasound   TREATMENT PLAN:  Effective Dates: 8/19/2020 TO 11/17/2020 (90 days). Frequency/Duration: 2 times a week for 90 Days  GOALS: (Goals have been discussed and agreed upon with patient.)     Short-Term Goals~4 weeks  Goal Met   1. Yuriy Valenzuela will report <=2/10 pain with working on his computer during the day at work 1. [] Date:   2. Yuriy Valenzuela will demonstrate improvement in active shoulder R IR to >T9  to increase UE function and participation in ADLs. 2.  [] Date:   3. Yuriy Valenzuela will demonstrate demonstrate improvement in active shoulder R flexion to >165 degrees to increase UE function and participation in ADLs. 3.  [] Date:   4. Yuriy Valenzuela will show a greater than 8 point decrease on the DASH in order to show an increase in upper extremity function. 4.  [] Date:   5. Yuriy Valenzuela will be independent in all HEP 5.  [] Date:   6.  6.  [] Date:         Long Term Goals~8 weeks Goal Met   1. Yuriy Valenzuela will show full ROM of the UE in order to return to full functional mobility  1. [] Date:   2. Yuriy Valenzuela will show a greater than 15 point decrease on the DASH in order to show an increase in upper extremity function 2. [] Date:   3. Yuriy Valenzuela will report doing hair/bathing without difficulty and <=2/10 pain in order to be independent with ADL's 3.  [] Date:   4. Yuriy Valenzuela will be independent in all advanced HEP 4.  [] Date:            Outcome Measure: Tool Used: Disabilities of the Arm, Shoulder and Hand (DASH) Questionnaire - Quick Version  Score:  Initial: 19/55  Most Recent: X/55 (Date: -- )   Interpretation of Score: The DASH is designed to measure the activities of daily living in person's with upper extremity dysfunction or pain.   Each section is scored on a 1-5 scale, 5 representing the greatest disability. The scores of each section are added together for a total score of 55. Medical Necessity:   · Skilled intervention continues to be required due to deficits and impairments seen upon initial evaluation affecting patient's participation in ADLs and functional tasks. Reason for Services/Other Comments:  · Patient continues to require skilled intervention due to deficits and impairments seen upon initial evaluation affecting patient's participation in ADLs and functional tasks. Total Treatment Duration: 40 mins  PT Patient Time In/Time Out  Time In: 1550  Time Out: 6176    Rehabilitation Potential For Stated Goals: good  Regarding Micheline Curtis Alvarado's therapy, I certify that the treatment plan above will be carried out by a therapist or under their direction. Thank you for this referral,  Zaida Garcia     Referring Physician Signature: Ana Rosa Ruvalcaba NP              Date                    HISTORY:   History of Present Injury/Illness (Reason for Referral):  Mely Merchant is a 54 y.o male referred to PT by his PCP due to acute onset of R shoulder pain about 3 weeks ago. No VAN that he can recall but relates it to working on his computer mouse. The pain goes up to his neck but no referral patterns down the arm (N/T).      -Present symptoms/complaints (on day of evaluation)  Pain Scale:  · Current: 4/10  · Best: 3/10  · Worst: 7/10    · Aggravating factors: sleeping and using his computer mouse and reaching out away from body  · Relieving factors: massaging it himself, resting arm by side and ibuprofen  · Irritability: Low (Onset of pain is is longer than the time it takes for Pain to go away)    Dominant Side:  right  Past Medical History/Comorbidities:   Mr. Jose Lucio  has a past medical history of Alcoholism /alcohol abuse ([facility] [address] Anxiety, Cardiomyopathy ([facility] [address] (6/30/2016), Diverticular disease, Essential hypertension, GERD (gastroesophageal reflux disease), Hiatal hernia with gastroesophageal reflux (2/5/2013), and Hypertension. He also has no past medical history of Dementia, Difficult intubation, Endocrine disease, Infectious disease, Malignant hyperthermia due to anesthesia, Nausea & vomiting, Neurological disorder, Pseudocholinesterase deficiency, Sleep disorder, or Unspecified adverse effect of anesthesia. Mr. Carmelo Finn  has a past surgical history that includes hx cyst removal (2017); hx colectomy (4/2008); hx hernia repair; hx colonoscopy (3/2018, 2008); and colonoscopy (N/A, 2/24/2020). Social History/Living Environment:    - a lot of computer work   Prior Level of Function/Work/Activity:  Unrestricted  Other Clinical Tests:         none  Current Medications:    Current Outpatient Medications:     cyanocobalamin 1,000 mcg tablet, Take 1,000 mcg by mouth daily. , Disp: , Rfl:     ascorbic acid (VITAMIN C) 1,000 mg tablet, Take 1,000 mg by mouth every morning.  , Disp: , Rfl:     multivitamin (ONE A DAY) tablet, Take 1 Tab by mouth every morning.  , Disp: , Rfl:         Ambulatory/Rehab Services H2 Model Falls Risk Assessment    Risk Factors:       (1)  Gender [Male] Ability to Rise from Chair:       (0)  Ability to rise in a single movement    Falls Prevention Plan:       No modifications necessary   Total: (5 or greater = High Risk): 1    ©2010 [facility] [employer]. All Rights Reserved. [facility] Patent #[id number]. Federal Law prohibits the replication, distribution or use without written permission from [facility] [employer]       Date Last Reviewed:  8/19/2020   Number of Personal Factors/Comorbidities that affect the Plan of Care: 1-2: MODERATE COMPLEXITY   EXAMINATION:   Observation/Orthostatic Postural Assessment:     Forward Head and Rounded Shoulders  Palpation:          TTP posterior scapular mms (rhomboids) with trp present  ROM:    AROM/PROM         Joint: Eval Date: 8/19/2020  Re-Assess Date: Re-Assess Date:    ACTIVE ROM (standing) RIGHT LEFT RIGHT LEFT RIGHT LEFT   Shoulder Flexion 156            Shoulder Abduction             Shoulder Internal Rotation (Apley's) T7            Shoulder External Rotation (Apley's) T4            Elbow ROM             PASSIVE ROM (supine)             Shoulder Flexion             Shoulder Abduction             Shoulder Internal Rotation   Manny Price  Milse  Miles    Shoulder External Rotation                                                      Strength:    Joint: Eval Date: 8/19/2020  Re-Assess Date:  Re-Assess Date:     RIGHT LEFT RIGHT LEFT RIGHT LEFT   Shoulder Flexion 4/5 4/5           Shoulder Abduction  (C5) 4/5 4/5           Shoulder Internal Rotation 5/5 5/5           Shoulder External Rotation 4/5 4+/5           Elbow Flexion  (C6) 5/5 5/5           Elbow Extension (C7) 4/5 5/5           Wrist Flexion (C7)             Wrist Extension (C6)             Resisted Thumb Extension/Finger Abduction (C8/T1)             Resisted Cervical Rotation (C1):             Resisted Shoulder Shrug (C2, 3, 4):               Strength                                                        Manual:  Joint Directon Grade Treatment Effect   Thoracic spine=hypomobile  PA III and IV Improved Symptoms post treatment                   Special Tests:     Peralta-Freddie: Positive   Neer's: Negative    Neurological Screen: Assessed @ Initial Visit    Radiating symptoms? Yes/No=NO  Functional Mobility:  Assessed @ Initial Visit= limited thoracic ext and postural muscles         Body Structures Involved:  1. Bones  2. Joints  3. Muscles  4. Ligaments Body Functions Affected:  1. Sensory/Pain  2. Neuromusculoskeletal  3. Movement Related Activities and Participation Affected:  1. Mobility  2.  Self Care   Number of elements that affect the Plan of Care: 1-2: LOW COMPLEXITY   CLINICAL PRESENTATION: Presentation: Stable and uncomplicated: LOW COMPLEXITY   CLINICAL DECISION MAKING:      Use of outcome tool(s) and clinical judgement create a POC that gives a: Clear prediction of patient's progress: LOW COMPLEXITY     See associated treatment note for treatment provided today. No future appointments.       Ranjith Mustafa

## 2020-08-19 NOTE — PROGRESS NOTES
Ramya Arora  : 1965  Payor: Mariam Vanegas / Plan: Janine Hodgkin / Product Type: Commerical /  [address] at [address] [place], [address], [address]  [phone]   [phone]                                                          Jessica Jacobo NP      OUTPATIENT PHYSICAL THERAPY: Daily Treatment Note 2020 Visit Count:  1    Tx Diagnosis   ICD-10: Treatment Diagnosis:   Pain in Right Shoulder (M25.511)  Impingement syndrome of Right Shoulder (M75.41)              Precautions/Allergies:   Pcn [penicillins]   Fall Risk Score: 1 (? 5 = High Risk)  MD Orders: Eval and Treat  MEDICAL/REFERRING DIAGNOSIS:  Acute pain of right shoulder [M25.511]   DATE OF ONSET: 3 weeks ago  REFERRING PHYSICIAN: Jessica Jacobo NP  RETURN PHYSICIAN APPOINTMENT: TBD by patient        Pre-treatment Symptoms/Complaints:  R shoulder pain working on computer    Pain: Initial:   4 Post Session:  2/10   Medications Last Reviewed:  2020     Updated Objective Findings:  See initial Evaluation for initial objective measures. Thoracic hypomobility  R Rhomboid trp   TREATMENT:   THERAPEUTIC EXERCISE: (15 minutes):  Exercises per grid below to improve mobility, strength and balance. Required minimal visual, verbal and manual cues to promote proper body alignment and promote proper body posture. Progressed resistance and complexity of movement as indicated. Date:  20 Date:   Date:     Activity/Exercise Parameters Parameters Parameters   Education HEP, POC, PT goals, anatomy/pathology     L stretch 2 mins     Prone I and T 10x ea     THoracic ext 2 mins on foam roller     Body mechanics edu 4 mins on proper ergonomics at computer                   THERAPEUTIC ACTIVITY: (0 minutes):  Activities per gid below to improve functional movement related mobility, strength and balance to improve neuro-muscular carryover to daily functional activities for improving patient's quality of life. Required moderate visual, verbal and manual cues to promote proper body alignment and promote proper body posture/mechanics. Progressed resistance and complexity of movement as indicated. Date:   Date:   Date:     Activity/Exercise Parameters Parameters Parameters                                                   MANUAL THERAPY: (10 minutes): Joint mobilization, Soft tissue mobilization was utilized and necessary because of the patient's restricted joint motion and restricted motion of soft tissue mobility.    Technique Used Grade  Level # Time(s) Effect while being performed   Joint mobs III-IV thoracic 6 Improved mobility and decrease symptoms   release  R rhomboid 4 Improved symptoms                                        [facility] Portal  Treatment/Session Summary:    · Response to Treatment:  Pt demonstrated understanding of POC and initial HEP. No increase in pain or adverse reactions  · Communication/Consultation:  POC, HEP, PT goals, Faxed initial evaluation to MD  · Equipment provided today:  HEP handout     Recommendations/Intent for next treatment session: Next visit will focus on manual interventions as indicated and therapeutic exercises/activities for improving ROM and strength. Treatment Plan of Care Effective Dates: 8/19/20 TO 11/17/2020 (90 days). Frequency/Duration: 2 times a week for 90 Days      Total Treatment Billable Duration:  40 Rx; 15 therex, 10 manual, 15 eval  PT Patient Time In/Time Out  Time In: 1550  Time Out: 1086  Evita N Hernadez    No future appointments.

## 2020-08-31 ENCOUNTER — HOSPITAL ENCOUNTER (OUTPATIENT)
Dept: PHYSICAL THERAPY | Age: 55
Discharge: HOME | End: 2020-08-31
Payer: COMMERCIAL

## 2020-08-31 PROCEDURE — 97110 THERAPEUTIC EXERCISES: CPT

## 2020-08-31 PROCEDURE — 97140 MANUAL THERAPY 1/> REGIONS: CPT

## 2020-08-31 NOTE — PROGRESS NOTES
Wendy Campbell  : 1965  Payor: Tracey Wade / Plan: Casie Guzman / Product Type: Commerical /  [address] at [address] [place] [address]. [address], [address] [place] [address] Road  [phone]   [phone]                                                          Dominique Church NP      OUTPATIENT PHYSICAL THERAPY: Daily Treatment Note 2020 Visit Count:  2    Tx Diagnosis   ICD-10: Treatment Diagnosis:   Pain in Right Shoulder (M25.511)  Impingement syndrome of Right Shoulder (M75.41)              Precautions/Allergies:   Pcn [penicillins]   Fall Risk Score: 1 (? 5 = High Risk)  MD Orders: Eval and Treat  MEDICAL/REFERRING DIAGNOSIS:  Acute pain of right shoulder [M25.511]   DATE OF ONSET: 3 weeks ago  REFERRING PHYSICIAN: Amelia Hughes NP  RETURN PHYSICIAN APPOINTMENT: TBD by patient        Pre-treatment Symptoms/Complaints:  \"I have been doing the exercises but they dont seem to be doing anything for me. I think TENS unit would help. \"    Pain: Initial:   4 Post Session:  2/10   Medications Last Reviewed:  2020     Updated Objective Findings:  See initial Evaluation for initial objective measures. Thoracic hypomobility  R Rhomboid trp   TREATMENT:   THERAPEUTIC EXERCISE: (30 minutes):  Exercises per grid below to improve mobility, strength and balance. Required minimal visual, verbal and manual cues to promote proper body alignment and promote proper body posture. Progressed resistance and complexity of movement as indicated.      Date:  20 Date:  20 Date:     Activity/Exercise Parameters Parameters Parameters   Education HEP, POC, PT goals, anatomy/pathology Benefits of strengthening/ROM for long term effects for postural strength and reducing symptoms vs TENS    L stretch 2 mins 2 mins    Prone I and T 10x ea     THoracic ext 2 mins on foam roller 3 mins on foam roller    Body mechanics edu 4 mins on proper ergonomics at computer     nustep for ROM 9 mins L4    shrugs  3x10 5#    Scapular retraction  3x10 23#    Open books      SA punch  3x10 6#                        MANUAL THERAPY: (10 minutes): Joint mobilization, Soft tissue mobilization was utilized and necessary because of the patient's restricted joint motion and restricted motion of soft tissue mobility.    Technique Used Grade  Level # Time(s) Effect while being performed   Joint mobs III-IV thoracic 6 Improved mobility and decrease symptoms   release  R rhomboid 4 Improved symptoms                                        [facility] Portal  Treatment/Session Summary:    · Response to Treatment:  Pt  Responded very well to all interventions today with decrease in pain following today's session. · Communication/Consultation:  POC, HEP, PT goals, Faxed initial evaluation to MD  · Equipment provided today:  HEP handout     Recommendations/Intent for next treatment session: Next visit will focus on manual interventions as indicated and therapeutic exercises/activities for improving ROM and strength. Treatment Plan of Care Effective Dates: 8/19/20 TO 11/17/2020 (90 days).   Frequency/Duration: 2 times a week for 90 Days      Total Treatment Billable Duration:  40 Rx  PT Patient Time In/Time Out  Time In: 1742  Time Out: 1212 [facility]    Future Appointments   Date Time Provider Joe Batista   9/2/2020  4:00 PM Farrukh Cardenas, RT [facility]   9/8/2020  4:00 PM Angelica BROTHERS [facility]   9/9/2020  4:00 PM Farrukh Cardenas, RT [facility]   9/14/2020  4:00 PM Farrukh Cardenas, RT [facility]   9/16/2020  4:00 PM Olga Cardenas, RT [facility]   9/21/2020  4:00 PM Farrukh Cardenas, RT [facility]   9/23/2020  4:00 PM Olga Cardenas, RT [facility]   9/28/2020  4:00 PM Olga Cardenas, RT [facility]   9/30/2020  4:00 PM Olga Cardenas, RT [facility]   10/5/2020  4:00 PM [facility]   10/7/2020  4:00 PM [facility]

## 2020-09-02 ENCOUNTER — HOSPITAL ENCOUNTER (OUTPATIENT)
Dept: PHYSICAL THERAPY | Age: 55
Discharge: HOME | End: 2020-09-02
Payer: COMMERCIAL

## 2020-09-02 PROCEDURE — 97140 MANUAL THERAPY 1/> REGIONS: CPT

## 2020-09-02 PROCEDURE — 97110 THERAPEUTIC EXERCISES: CPT

## 2020-09-02 NOTE — PROGRESS NOTES
Irlanda Public  : 1965  Payor: Michelle Nones / Plan: Norm Noble / Product Type: Commerical /  Prerna Hernandes at [address], [id number] [place] [address] Road  [phone]   [phone]                                                          Eustace Kocher, NP      OUTPATIENT PHYSICAL THERAPY: Daily Treatment Note 2020 Visit Count:  3    Tx Diagnosis   ICD-10: Treatment Diagnosis:   Pain in Right Shoulder (M25.511)  Impingement syndrome of Right Shoulder (M75.41)              Precautions/Allergies:   Pcn [penicillins]   Fall Risk Score: 1 (? 5 = High Risk)  MD Orders: Eval and Treat  MEDICAL/REFERRING DIAGNOSIS:  Acute pain of right shoulder [M25.511]   DATE OF ONSET: 3 weeks ago  REFERRING PHYSICIAN: Eustace Kocher, NP  RETURN PHYSICIAN APPOINTMENT: TBD by patient        Pre-treatment Symptoms/Complaints:  I think we are moving in the right direction    Pain: Initial:   4 Post Session:  2/10   Medications Last Reviewed:  2020     Updated Objective Findings:  AROM cervical spine  Flexion: 70, extension 60, bilateral SB 40, right rotation 60, left 70 no pain all planes. Thoracic hypomobility  R Rhomboid trp   TREATMENT:   THERAPEUTIC EXERCISE: (30 minutes):  Exercises per grid below to improve mobility, strength and balance. Required minimal visual, verbal and manual cues to promote proper body alignment and promote proper body posture. Progressed resistance and complexity of movement as indicated.      Date:  20 Date:  20 Date:  20     Activity/Exercise Parameters Parameters Parameters   Education HEP, POC, PT goals, anatomy/pathology Benefits of strengthening/ROM for long term effects for postural strength and reducing symptoms vs TENS    L stretch 2 mins 2 mins 2 min   Prone I and T 10x ea     THoracic ext 2 mins on foam roller 3 mins on foam roller 2 min   Body mechanics edu 4 mins on proper ergonomics at computer     nustep for ROM  9 mins L4 UBE standing  3/3 L 5   shrugs  3x10 5# 3 x 10 5#   Scapular retraction  3x10 23#    Open books      SA punch  3x10 6# 3 x 10    scaption   2 x 10 5# `   Cat camel child pose   20 x ea           MANUAL THERAPY: (10 minutes): Joint mobilization, Soft tissue mobilization was utilized and necessary because of the patient's restricted joint motion and restricted motion of soft tissue mobility.    Technique Used Grade  Level # Time(s) Effect while being performed   Joint mobs III-IV thoracic 6 Improved mobility and decrease symptoms   release  R rhomboid 4 Improved symptoms   CT junction  V   Dec pain   Supine thoracic thrust  V T4-6  Dec pain                          [employer] Portal  Treatment/Session Summary:    · Response to Treatment:  Pt  Responded very well to all interventions today with decrease in pain following today's session. · Communication/Consultation:  POC, HEP, PT goals, Faxed initial evaluation to MD  · Equipment provided today:  HEP handout     Recommendations/Intent for next treatment session: Next visit will focus on manual interventions as indicated and therapeutic exercises/activities for improving ROM and strength. Treatment Plan of Care Effective Dates: 8/19/20 TO 11/17/2020 (90 days).   Frequency/Duration: 2 times a week for 90 Days      Total Treatment Billable Duration:  40 Rx  PT Patient Time In/Time Out  Time In: 8385  Time Out: R [address], RT    Future Appointments   Date Time Provider Joe Batista   9/8/2020  4:00 PM [facility]   9/9/2020  4:00 PM PapAlyssia aggarwal, RT [facility]   9/14/2020  4:00 PM [facility]   9/16/2020  4:00 PM PapbrittanyfuAlyssia chen, RT [facility]   9/21/2020  4:00 PM [facility]lyssia [facility]ardenas, [facility]   9/23/2020  4:00 PM Alyssia Cardenas, [facility]   9/28/2020  4:00 PM Olga [facility]as[facility]   9/30/2020  4:00 PM Papbrittanyfugustavo, Rahul Pump, RT [facility]   10/5/2020  4:00 PM Papenfuss, Rahul Pump, RT [facility]   10/7/2020  4:00 PM Papbrittanyfuss, Rahul Pump, RT [facility]

## 2020-09-08 ENCOUNTER — HOSPITAL ENCOUNTER (OUTPATIENT)
Dept: PHYSICAL THERAPY | Age: 55
Discharge: HOME | End: 2020-09-08
Payer: COMMERCIAL

## 2020-09-08 PROCEDURE — 97140 MANUAL THERAPY 1/> REGIONS: CPT

## 2020-09-08 PROCEDURE — 97110 THERAPEUTIC EXERCISES: CPT

## 2020-09-08 NOTE — PROGRESS NOTES
Estevan Breath  : 1965  Payor: Tay Ch / Plan: Tk Pinedagustavo / [facility]: [facility] /  [address] at [place] [address]. [address], [id number] [place] [address]  [phone]   [phone]                                                          Eduardo Cooley NP      OUTPATIENT PHYSICAL THERAPY: Daily Treatment Note 2020 Visit Count:  4    Tx Diagnosis   ICD-10: Treatment Diagnosis:   Pain in Right Shoulder (M25.511)  Impingement syndrome of Right Shoulder (M75.41)              Precautions/Allergies:   Pcn [penicillins]   Fall Risk Score: 1 (? 5 = High Risk)  MD Orders: Eval and Treat  MEDICAL/REFERRING DIAGNOSIS:  Acute pain of right shoulder [M25.511]   DATE OF ONSET: 3 weeks ago  REFERRING PHYSICIAN: Eduardo Cooley NP  RETURN PHYSICIAN APPOINTMENT: TBD by patient        Pre-treatment Symptoms/Complaints:  I think we are moving in the right direction. Its feeling like its getting less    Pain: Initial:   4 Post Session:  2/10   Medications Last Reviewed:  2020     Updated Objective Findings:  AROM cervical spine  Flexion: 70, extension 60, bilateral SB 40, right rotation 60, left 70 no pain all planes. Thoracic hypomobility  R Rhomboid trp   TREATMENT:   THERAPEUTIC EXERCISE: (28 minutes):  Exercises per grid below to improve mobility, strength and balance. Required minimal visual, verbal and manual cues to promote proper body alignment and promote proper body posture. Progressed resistance and complexity of movement as indicated.      Date:  20 Date:  20 Date:  20   Date  20   Activity/Exercise Parameters Parameters Parameters    Education HEP, POC, PT goals, anatomy/pathology Benefits of strengthening/ROM for long term effects for postural strength and reducing symptoms vs TENS     L stretch 2 mins 2 mins 2 min 2 mins   Prone I and T 10x ea      THoracic ext 2 mins on foam roller 3 mins on foam roller 2 min 3 mins   Body mechanics edu 4 mins on proper ergonomics at computer      nustep for ROM  9 mins L4 UBE standing  3/3 L 5 UBE 3/3/ L 5   shrugs  3x10 5# 3 x 10 5# 3x10 6#   Scapular retraction  3x10 23#  10x10\" 23#   Open books       SA punch  3x10 6# 3 x 10  3x10 6#   scaption   2 x 10 5# `    Cat camel child pose   20 x ea 20x ea   Bent over rows    10# bar 20x5\"           MANUAL THERAPY: (12 minutes): Joint mobilization, Soft tissue mobilization was utilized and necessary because of the patient's restricted joint motion and restricted motion of soft tissue mobility.    Technique Used Grade  Level # Time(s) Effect while being performed   Joint mobs III-IV thoracic 6 Improved mobility and decrease symptoms   release  R rhomboid 4 Improved symptoms   CT junction  V   NT   Supine thoracic thrust  V T4-6 2 Dec pain                          [facility] Portal  Treatment/Session Summary:    · Response to Treatment:  Pt  Responded very well to all interventions today with decrease in pain following today's session again today. He is progressing well with functional mobility   · Communication/Consultation:  POC, HEP, PT goals, Faxed initial evaluation to MD  · Equipment provided today:  HEP handout     Recommendations/Intent for next treatment session: Next visit will focus on manual interventions as indicated and therapeutic exercises/activities for improving ROM and strength. Treatment Plan of Care Effective Dates: 8/19/20 TO 11/17/2020 (90 days).   Frequency/Duration: 2 times a week for 90 Days      Total Treatment Billable Duration:  40 Rx  PT Patient Time In/Time Out  Time In: 0941  Time Out: [address]    Future Appointments   Date Time Provider Joe Batista   9/9/2020  4:00 PM Papenfuss, Rahul Pump, RT [facility]   9/14/2020  4:00 PM Papenfuss, Rahul Pump, RT [facility]   9/16/2020  4:00 PM Bob Cardenas, RT [facility]   9/21/2020  4:00 PM Papenfuss, Rahul Pump, RT [facility] 9/23/2020  4:00 PM Papenfugustavo, Giorgio Whitten, RT [facility]   9/28/2020  4:00 PM Papenfugustavo, Giorgio Maricao, RT [facility]   9/30/2020  4:00 PM Papenfugustavo, Giorgio Maricao, RT [facility]   10/5/2020  4:00 PM Papenfugustavo, Giorgio Maricao, RT [facility]   10/7/2020  4:00 PM Papenfugustavo, Giorgio Maricao, RT [facility]

## 2020-09-09 ENCOUNTER — HOSPITAL ENCOUNTER (OUTPATIENT)
Dept: PHYSICAL THERAPY | Age: 55
Discharge: HOME | End: 2020-09-09
Payer: COMMERCIAL

## 2020-09-09 PROCEDURE — 97140 MANUAL THERAPY 1/> REGIONS: CPT

## 2020-09-09 PROCEDURE — 97110 THERAPEUTIC EXERCISES: CPT

## 2020-09-09 NOTE — PROGRESS NOTES
Johnny Blanchard  : 1965  Payor: Evelina Velez / Plan: Radha Loera / Product Type: Commerical /  [address]. [address], [address]  [phone]   [phone]                                                          [facility]      OUTPATIENT PHYSICAL THERAPY: Daily Treatment Note 2020 Visit Count:  5    Tx Diagnosis   ICD-10: Treatment Diagnosis:   Pain in Right Shoulder (M25.511)  Impingement syndrome of Right Shoulder (M75.41)              Precautions/Allergies:   Pcn [penicillins]   Fall Risk Score: 1 (? 5 = High Risk)  MD Orders: Eval and Treat  MEDICAL/REFERRING DIAGNOSIS:  Acute pain of right shoulder [M25.511]   DATE OF ONSET: 3 weeks ago  REFERRING PHYSICIAN: Shanita Healy NP  RETURN PHYSICIAN APPOINTMENT: TBD by patient        Pre-treatment Symptoms/Complaints:  Felt really good yesterday. Sore today but not as bad. Pain: Initial:   3 Post Session:  1/10   Medications Last Reviewed:  2020     Updated Objective Findings:  AROM cervical spine  Flexion: 70, extension 60, bilateral SB 40, right rotation 60, left 70 no pain all planes. Thoracic hypomobility  R Rhomboid trp   TREATMENT:   THERAPEUTIC EXERCISE: (30 minutes):  Exercises per grid below to improve mobility, strength and balance. Required minimal visual, verbal and manual cues to promote proper body alignment and promote proper body posture. Progressed resistance and complexity of movement as indicated.      Date:  20 Date:  20 Date:  20   Date  20 Date  20   Activity/Exercise Parameters Parameters Parameters     Education HEP, POC, PT goals, anatomy/pathology Benefits of strengthening/ROM for long term effects for postural strength and reducing symptoms vs TENS      L stretch 2 mins 2 mins 2 min 2 mins 2 min   Prone I and T 10x ea       THoracic ext 2 mins on foam roller 3 mins on foam roller 2 min 3 mins Kneeling arms on chair 4 x   Body mechanics edu 4 mins on proper ergonomics at computer       nustep for ROM  9 mins L4 UBE standing  3/3 L 5 UBE 3/3/ L 5 UBE hill program L 5 5 min    shrugs  3x10 5# 3 x 10 5# 3x10 6# 2 x 10 7#   Scapular retraction  3x10 23#  10x10\" 23#    SA punch  3x10 6# 3 x 10  3x10 6#    scaption   2 x 10 5# `     Cat camel child pose   20 x ea 20x ea 20 x ea   Bent over rows    10# bar 20x5\"    SL open books      20 x ea side    Prone on elbows cervical ext/flex     10 x           MANUAL THERAPY: (15 minutes): Joint mobilization, Soft tissue mobilization was utilized and necessary because of the patient's restricted joint motion and restricted motion of soft tissue mobility.    Technique Used Grade  Level # Time(s) Effect while being performed   Joint mobs III-IV thoracic 6 Improved mobility and decrease symptoms   release  R rhomboid 4 Improved symptoms   CT junction  V   NT   Supine thoracic thrust  V T4-6 2 Dec pain   Cervical towel traction    3 min                   [facility] Portal  Treatment/Session Summary:    · Response to Treatment:  Pt  Responded very well to all interventions today with decrease in pain following today's session again today. He is progressing well with functional mobility   · Communication/Consultation:  POC, HEP, PT goals, Faxed initial evaluation to MD  · Equipment provided today:  HEP handout     Recommendations/Intent for next treatment session: Next visit will focus on manual interventions as indicated and therapeutic exercises/activities for improving ROM and strength. Treatment Plan of Care Effective Dates: 8/19/20 TO 11/17/2020 (90 days).   Frequency/Duration: 2 times a week for 90 Days      Total Treatment Billable Duration:  40 Rx  PT Patient Time In/Time Out  Time In: [address], RT    Future Appointments   Date Time Provider Joe Batista   9/9/2020  4:00 PM Юлия Cardenas, RT [facility]   9/14/2020  4:00 PM Ronald Юлия Mercado, RT [facility]   9/16/2020  4:00 PM Юлия Cardenas, RT [facility]   9/21/2020  4:00 PM Юлия Cardenas, RT [facility]   9/23/2020  4:00 PM Юлия Cardenas, RT [facility]   9/28/2020  4:00 PM Юлия Cardenas, RT [facility]   9/30/2020  4:00 PM Юлия Cardenas, RT [facility]   10/5/2020  4:00 PM Olga Cardenas, RT [facility]   10/7/2020  4:00 PM Юлия Cardenas, RT [facility]

## 2020-09-14 ENCOUNTER — HOSPITAL ENCOUNTER (OUTPATIENT)
Dept: PHYSICAL THERAPY | Age: 55
Discharge: HOME | End: 2020-09-14
Payer: COMMERCIAL

## 2020-09-14 PROCEDURE — 97140 MANUAL THERAPY 1/> REGIONS: CPT

## 2020-09-14 PROCEDURE — 97110 THERAPEUTIC EXERCISES: CPT

## 2020-09-14 NOTE — PROGRESS NOTES
Hamzah Butcher  : 1965  Payor: Juan Merchant / Plan: Janki Durahm / Product Type: Commerical /  Carrie [address]noch at [address] Alma[address] FlemingChance, Suite [facility], [address] [place] [address]  [phone]   [phone]                                                          Wilner Martinez NP      OUTPATIENT PHYSICAL THERAPY: Daily Treatment Note 2020 Visit Count:  6    Tx Diagnosis   ICD-10: Treatment Diagnosis:   Pain in Right Shoulder (M25.511)  Impingement syndrome of Right Shoulder (M75.41)              Precautions/Allergies:   Pcn [penicillins]   Fall Risk Score: 1 (? 5 = High Risk)  MD Orders: Eval and Treat  MEDICAL/REFERRING DIAGNOSIS:  Acute pain of right shoulder [M25.511]   DATE OF ONSET: 3 weeks ago  REFERRING PHYSICIAN: Amelia Hughes NP  RETURN PHYSICIAN APPOINTMENT: TBD by patient        Pre-treatment Symptoms/Complaints:  Sore not pain, localized to mid back right side, no radiating pain. Pain: Initial:   3 Post Session:  1/10   Medications Last Reviewed:  2020     Updated Objective Findings:  AROM cervical spine  Flexion: 70, extension 60, bilateral SB 40, right rotation 60, left 70 no pain all planes. Thoracic hypomobility  R Rhomboid trp   TREATMENT:   THERAPEUTIC EXERCISE: (30 minutes):  Exercises per grid below to improve mobility, strength and balance. Required minimal visual, verbal and manual cues to promote proper body alignment and promote proper body posture. Progressed resistance and complexity of movement as indicated.      Date:  20 Date:  20 Date:  20   Date  20 Date  20 Date 20   Activity/Exercise Parameters Parameters Parameters      Education HEP, POC, PT goals, anatomy/pathology Benefits of strengthening/ROM for long term effects for postural strength and reducing symptoms vs TENS       L stretch 2 mins 2 mins 2 min 2 mins 2 min 1 min   Prone I and T 10x ea        THoracic ext 2 mins on foam roller 3 mins on foam roller 2 min 3 mins Kneeling arms on chair 4 x 10 x  Kneeling arms on chair   Body mechanics edu 4 mins on proper ergonomics at computer        nustep for ROM  9 mins L4 UBE standing  3/3 L 5 UBE 3/3/ L 5 UBE hill program L 5 5 min  UBE hill L 7 6 min   shrugs  3x10 5# 3 x 10 5# 3x10 6# 2 x 10 7# 2 x 10 8#   Scapular retraction  3x10 23#  10x10\" 23#     SA punch  3x10 6# 3 x 10  3x10 6#     scaption   2 x 10 5# `      Cat camel child pose   20 x ea 20x ea 20 x ea 20 x ea   Bent over rows    10# bar 20x5\"     SL open books      20 x ea side     Prone on elbows cervical ext/flex     10 x 10 x   Wall slide with lift off        20 x    Band pull aparts       Black 10 x            MANUAL THERAPY: (15 minutes): Joint mobilization, Soft tissue mobilization was utilized and necessary because of the patient's restricted joint motion and restricted motion of soft tissue mobility.    Technique Used Grade  Level # Time(s) Effect while being performed   Joint mobs III-IV thoracic 6 Improved mobility and decrease symptoms   release  R rhomboid 4 Improved symptoms   CT junction  V   NT   Supine thoracic thrust  V T4-6 2 Dec pain   Cervical towel traction    3 min                   [facility] Portal  Treatment/Session Summary:    · Response to Treatment: no pain with exercise  · Communication/Consultation:  Continue stretching at work. · Equipment provided today:  HEP handout     Recommendations/Intent for next treatment session: Next visit will focus on manual interventions as indicated and therapeutic exercises/activities for improving ROM and strength. Treatment Plan of Care Effective Dates: 8/19/20 TO 11/17/2020 (90 days).   Frequency/Duration: 2 times a week for 90 Days      Total Treatment Billable Duration:  40 Rx  PT Patient Time In/Time Out  Time In: 1600  Time Out: [address], RT    Future Appointments   Date Time Provider Joe Batista   9/16/2020  4:00 PM Kevin Cardenas, RT [facility]   9/21/2020  4:00 PM Janell Cardenas, RT [facility]   9/23/2020  4:00 PM Janell Cardenas, RT [facility]   9/28/2020  4:00 PM Janell Cardenas, RT [facility]   9/30/2020  4:00 PM Janell Cardenas, RT [facility]   10/5/2020  4:00 PM Olga Cardenas, RT [facility]   10/7/2020  4:00 PM Janell Cardenas, RT [facility]

## 2020-09-16 ENCOUNTER — HOSPITAL ENCOUNTER (OUTPATIENT)
Dept: PHYSICAL THERAPY | Age: 55
End: 2020-09-16
Payer: COMMERCIAL

## 2020-09-21 ENCOUNTER — HOSPITAL ENCOUNTER (INPATIENT)
Age: 55
LOS: 4 days | Discharge: HOME | DRG: 378 | End: 2020-09-25
Attending: EMERGENCY MEDICINE | Admitting: INTERNAL MEDICINE
Payer: COMMERCIAL

## 2020-09-21 ENCOUNTER — APPOINTMENT (OUTPATIENT)
Dept: PHYSICAL THERAPY | Age: 55
End: 2020-09-21
Payer: COMMERCIAL

## 2020-09-21 DIAGNOSIS — K92.2 GASTROINTESTINAL HEMORRHAGE, UNSPECIFIED GASTROINTESTINAL HEMORRHAGE TYPE: Primary | ICD-10-CM

## 2020-09-21 LAB
ALBUMIN SERPL-MCNC: 3 G/DL (ref 3.5–5)
ALBUMIN/GLOB SERPL: 1 {RATIO} (ref 1.2–3.5)
ALP SERPL-CCNC: 39 U/L (ref 50–136)
ALT SERPL-CCNC: 21 U/L (ref 12–65)
ANION GAP SERPL CALC-SCNC: 1 MMOL/L (ref 7–16)
AST SERPL-CCNC: 17 U/L (ref 15–37)
ATRIAL RATE: 70 BPM
BASOPHILS # BLD: 0 K/UL (ref 0–0.2)
BASOPHILS NFR BLD: 0 % (ref 0–2)
BILIRUB SERPL-MCNC: 0.3 MG/DL (ref 0.2–1.1)
BUN SERPL-MCNC: 23 MG/DL (ref 6–23)
CALCIUM SERPL-MCNC: 7.9 MG/DL (ref 8.3–10.4)
CALCULATED P AXIS, ECG09: 64 DEGREES
CALCULATED R AXIS, ECG10: 48 DEGREES
CALCULATED T AXIS, ECG11: 71 DEGREES
CHLORIDE SERPL-SCNC: 114 MMOL/L (ref 98–107)
CO2 SERPL-SCNC: 28 MMOL/L (ref 21–32)
CREAT SERPL-MCNC: 0.72 MG/DL (ref 0.8–1.5)
DIAGNOSIS, 93000: NORMAL
DIFFERENTIAL METHOD BLD: ABNORMAL
EOSINOPHIL # BLD: 0.2 K/UL (ref 0–0.8)
EOSINOPHIL NFR BLD: 3 % (ref 0.5–7.8)
ERYTHROCYTE [DISTWIDTH] IN BLOOD BY AUTOMATED COUNT: 13.4 % (ref 11.9–14.6)
GLOBULIN SER CALC-MCNC: 3.1 G/DL (ref 2.3–3.5)
GLUCOSE SERPL-MCNC: 118 MG/DL (ref 65–100)
HCT VFR BLD AUTO: 30.2 % (ref 41.1–50.3)
HCT VFR BLD AUTO: 34.4 % (ref 41.1–50.3)
HCT VFR BLD AUTO: 37.9 % (ref 41.1–50.3)
HGB BLD-MCNC: 11.1 G/DL (ref 13.6–17.2)
HGB BLD-MCNC: 12.3 G/DL (ref 13.6–17.2)
HGB BLD-MCNC: 9.7 G/DL (ref 13.6–17.2)
IMM GRANULOCYTES # BLD AUTO: 0 K/UL (ref 0–0.5)
IMM GRANULOCYTES NFR BLD AUTO: 0 % (ref 0–5)
LYMPHOCYTES # BLD: 1.3 K/UL (ref 0.5–4.6)
LYMPHOCYTES NFR BLD: 18 % (ref 13–44)
MCH RBC QN AUTO: 32.1 PG (ref 26.1–32.9)
MCHC RBC AUTO-ENTMCNC: 32.5 G/DL (ref 31.4–35)
MCV RBC AUTO: 99 FL (ref 79.6–97.8)
MONOCYTES # BLD: 0.4 K/UL (ref 0.1–1.3)
MONOCYTES NFR BLD: 5 % (ref 4–12)
NEUTS SEG # BLD: 5.4 K/UL (ref 1.7–8.2)
NEUTS SEG NFR BLD: 74 % (ref 43–78)
NRBC # BLD: 0 K/UL (ref 0–0.2)
P-R INTERVAL, ECG05: 156 MS
PLATELET # BLD AUTO: 184 K/UL (ref 150–450)
PMV BLD AUTO: 9.1 FL (ref 9.4–12.3)
POTASSIUM SERPL-SCNC: 4.5 MMOL/L (ref 3.5–5.1)
PROT SERPL-MCNC: 6.1 G/DL (ref 6.3–8.2)
Q-T INTERVAL, ECG07: 390 MS
QRS DURATION, ECG06: 88 MS
QTC CALCULATION (BEZET), ECG08: 421 MS
RBC # BLD AUTO: 3.83 M/UL (ref 4.23–5.6)
SODIUM SERPL-SCNC: 143 MMOL/L (ref 136–145)
VENTRICULAR RATE, ECG03: 70 BPM
WBC # BLD AUTO: 7.4 K/UL (ref 4.3–11.1)

## 2020-09-21 PROCEDURE — 93005 ELECTROCARDIOGRAM TRACING: CPT | Performed by: EMERGENCY MEDICINE

## 2020-09-21 PROCEDURE — 74011250637 HC RX REV CODE- 250/637: Performed by: INTERNAL MEDICINE

## 2020-09-21 PROCEDURE — 86900 BLOOD TYPING SEROLOGIC ABO: CPT

## 2020-09-21 PROCEDURE — 85018 HEMOGLOBIN: CPT

## 2020-09-21 PROCEDURE — 85025 COMPLETE CBC W/AUTO DIFF WBC: CPT

## 2020-09-21 PROCEDURE — 65660000000 HC RM CCU STEPDOWN

## 2020-09-21 PROCEDURE — 86923 COMPATIBILITY TEST ELECTRIC: CPT

## 2020-09-21 PROCEDURE — 80053 COMPREHEN METABOLIC PANEL: CPT

## 2020-09-21 PROCEDURE — 99284 EMERGENCY DEPT VISIT MOD MDM: CPT

## 2020-09-21 PROCEDURE — C9113 INJ PANTOPRAZOLE SODIUM, VIA: HCPCS | Performed by: INTERNAL MEDICINE

## 2020-09-21 PROCEDURE — 36415 COLL VENOUS BLD VENIPUNCTURE: CPT

## 2020-09-21 PROCEDURE — 74011000250 HC RX REV CODE- 250: Performed by: INTERNAL MEDICINE

## 2020-09-21 PROCEDURE — 74011250636 HC RX REV CODE- 250/636: Performed by: INTERNAL MEDICINE

## 2020-09-21 PROCEDURE — 2709999900 HC NON-CHARGEABLE SUPPLY

## 2020-09-21 RX ORDER — ONDANSETRON 2 MG/ML
4 INJECTION INTRAMUSCULAR; INTRAVENOUS
Status: DISCONTINUED | OUTPATIENT
Start: 2020-09-21 | End: 2020-09-25 | Stop reason: HOSPADM

## 2020-09-21 RX ORDER — SODIUM CHLORIDE 0.9 % (FLUSH) 0.9 %
5-40 SYRINGE (ML) INJECTION EVERY 8 HOURS
Status: DISCONTINUED | OUTPATIENT
Start: 2020-09-21 | End: 2020-09-25 | Stop reason: HOSPADM

## 2020-09-21 RX ORDER — LANOLIN ALCOHOL/MO/W.PET/CERES
1000 CREAM (GRAM) TOPICAL DAILY
Status: DISCONTINUED | OUTPATIENT
Start: 2020-09-21 | End: 2020-09-25 | Stop reason: HOSPADM

## 2020-09-21 RX ORDER — SODIUM CHLORIDE 0.9 % (FLUSH) 0.9 %
5-40 SYRINGE (ML) INJECTION AS NEEDED
Status: DISCONTINUED | OUTPATIENT
Start: 2020-09-21 | End: 2020-09-25 | Stop reason: HOSPADM

## 2020-09-21 RX ORDER — SODIUM CHLORIDE, SODIUM LACTATE, POTASSIUM CHLORIDE, CALCIUM CHLORIDE 600; 310; 30; 20 MG/100ML; MG/100ML; MG/100ML; MG/100ML
50 INJECTION, SOLUTION INTRAVENOUS CONTINUOUS
Status: DISCONTINUED | OUTPATIENT
Start: 2020-09-21 | End: 2020-09-25 | Stop reason: HOSPADM

## 2020-09-21 RX ORDER — CHOLECALCIFEROL (VITAMIN D3) 50 MCG
1 CAPSULE ORAL DAILY
COMMUNITY

## 2020-09-21 RX ADMIN — PANTOPRAZOLE SODIUM 40 MG: 40 INJECTION, POWDER, FOR SOLUTION INTRAVENOUS at 20:47

## 2020-09-21 RX ADMIN — Medication 10 ML: at 14:23

## 2020-09-21 RX ADMIN — B-COMPLEX W/ C & FOLIC ACID TAB 1 TABLET: TAB at 08:40

## 2020-09-21 RX ADMIN — CYANOCOBALAMIN TAB 1000 MCG 1000 MCG: 1000 TAB at 08:40

## 2020-09-21 RX ADMIN — SODIUM CHLORIDE, SODIUM LACTATE, POTASSIUM CHLORIDE, AND CALCIUM CHLORIDE 100 ML/HR: 600; 310; 30; 20 INJECTION, SOLUTION INTRAVENOUS at 16:00

## 2020-09-21 RX ADMIN — Medication 10 ML: at 21:48

## 2020-09-21 RX ADMIN — PANTOPRAZOLE SODIUM 40 MG: 40 INJECTION, POWDER, FOR SOLUTION INTRAVENOUS at 08:40

## 2020-09-21 RX ADMIN — SODIUM CHLORIDE, SODIUM LACTATE, POTASSIUM CHLORIDE, AND CALCIUM CHLORIDE 100 ML/HR: 600; 310; 30; 20 INJECTION, SOLUTION INTRAVENOUS at 06:28

## 2020-09-21 NOTE — PROGRESS NOTES
Admitted after midnight for GI bleed. Hemodynamically stable. Trending hg, 12.4 on presentation. GI consult pending. No pain or nausea. No further BM since presentation. Hx similar, possibly diverticular bleeds.  Has required transfusion in the past.

## 2020-09-21 NOTE — H&P
Hospitalist Note     Admit Date:  2020  3:54 AM   Name:  Yuriy Valenzuela   Age:  54 y.o.  :  1965   MRN:  [id number]   PCP:  Felecia Bowie MD  Treatment Team: Attending Provider: Mei Kenney MD; Primary Nurse: Lynda Kruse    HPI/Subjective: The patient is a 61-year-old male with a past medical history of GI bleed, diverticulitis (s/p partial colon resection), and GERD (s/p Nissen fundoplication) presents to the ER with chief complaint of bloody stools. Patient reports he woke up earlier during the night and felt like he had to go to the bathroom. He reports when he got to the bathroom he passed a large amount of stool with a large amount of bright red blood. He reports after bowel movement he felt dizzy. No syncope reported. No bloody bowel movements prior to this 1. Patient does report he has been taking Aleve at least twice a day for the past several days. 10 systems reviewed and negative except as noted in HPI. Past Medical History:   Diagnosis Date    [facility]ism /[facility] ([facility]     Anxiety     Cardiomyopathy ([facility] [address] [facility] 2016    Diverticular disease     colon resection with no symptoms since surgery    Essential hypertension     managed with med.  GERD (gastroesophageal reflux disease)     takes medication but not altogether relieved    Hiatal hernia with gastroesophageal reflux 2013    Hypertension     managed with med.        Past Surgical History:   Procedure Laterality Date    COLONOSCOPY N/A 2020    COLONOSCOPY ROOM 517 performed by Brii Frederick MD at [address] [facility] HX COLECTOMY  2008    HX COLONOSCOPY  3/2018, 2008    10 years    HX CYST REMOVAL  2017    renal cyst    HX HERNIA REPAIR      Juan Manuel fundiplication      Allergies   Allergen Reactions    Pcn [Penicillins] Hives      Social History     Tobacco Use    Smoking status: Former Smoker     Years: 20.00     Last attempt to quit: 2015     Years since quittin.8    Smokeless tobacco: Never Used   Substance Use Topics    Alcohol use: No     Alcohol/week: 0.0 standard drinks     Comment: last drink 3/2015 sober      Family History   Problem Relation Age of Onset    Cancer Father         brain    Breast Cancer Mother     No Known Problems Brother       Immunization History   Administered Date(s) Administered    Tdap 11/10/2016     PTA Medications:  Prior to Admission Medications   Prescriptions Last Dose Informant Patient Reported? Taking?   ascorbic acid (VITAMIN C) 1,000 mg tablet   Yes No   Sig: Take 1,000 mg by mouth every morning. cyanocobalamin 1,000 mcg tablet   Yes No   Sig: Take 1,000 mcg by mouth daily. multivitamin (ONE A DAY) tablet   Yes No   Sig: Take 1 Tab by mouth every morning. Facility-Administered Medications: None       Objective:     Patient Vitals for the past 24 hrs:   Temp Pulse Resp BP SpO2   09/21/20 0414  76  127/77 97 %   09/21/20 0400 98.2 °F (36.8 °C) 73 16 119/68 99 %     Oxygen Therapy  O2 Sat (%): 97 % (09/21/20 0414)  Pulse via Oximetry: 76 beats per minute (09/21/20 0414)  O2 Device: Room air (09/21/20 0400)    Estimated body mass index is 28.89 kg/m² as calculated from the following:    Height as of this encounter: 5' 8\" (1.727 m). Weight as of this encounter: 86.2 kg (190 lb). No intake or output data in the 24 hours ending 09/21/20 0333    *Note that automatically entered I/Os may not be accurate; dependent on patient compliance with collection and accurate  by assistants.     Physical Exam:  General: Male in no acute distress  Eyes: PERRLA, EOMI, nonicteric  ENT: Moist mucous membranes  Cardiovascular: RRR, no significant rubs or murmurs appreciated  Respiratory: No wheezes, rales, or rhonchi; clear to auscultation bilaterally  Gastrointestinal: Soft, nontender, nondistended, bowel sounds active  Genitourinary: No suprapubic tenderness  Musculoskeletal: No joint swelling appreciated  Skin: No lesions on examined area  Neurological: Alert and oriented, no focal deficits noted  Psychiatric: Normal mood and affect    I reviewed the labs, imaging, EKGs, telemetry, and other studies done this admission. Data Review:   Recent Results (from the past 24 hour(s))   METABOLIC PANEL, COMPREHENSIVE    Collection Time: 09/21/20  4:05 AM   Result Value Ref Range    Sodium 143 136 - 145 mmol/L    Potassium 4.5 3.5 - 5.1 mmol/L    Chloride 114 (H) 98 - 107 mmol/L    CO2 28 21 - 32 mmol/L    Anion gap 1 (L) 7 - 16 mmol/L    Glucose 118 (H) 65 - 100 mg/dL    BUN 23 6 - 23 MG/DL    Creatinine 0.72 (L) 0.8 - 1.5 MG/DL    GFR est AA >60 >60 ml/min/1.73m2    GFR est non-AA >60 >60 ml/min/1.73m2    Calcium 7.9 (L) 8.3 - 10.4 MG/DL    Bilirubin, total 0.3 0.2 - 1.1 MG/DL    ALT (SGPT) 21 12 - 65 U/L    AST (SGOT) 17 15 - 37 U/L    Alk. phosphatase 39 (L) 50 - 136 U/L    Protein, total 6.1 (L) 6.3 - 8.2 g/dL    Albumin 3.0 (L) 3.5 - 5.0 g/dL    Globulin 3.1 2.3 - 3.5 g/dL    A-G Ratio 1.0 (L) 1.2 - 3.5     CBC WITH AUTOMATED DIFF    Collection Time: 09/21/20  4:05 AM   Result Value Ref Range    WBC 7.4 4.3 - 11.1 K/uL    RBC 3.83 (L) 4.23 - 5.6 M/uL    HGB 12.3 (L) 13.6 - 17.2 g/dL    HCT 37.9 (L) 41.1 - 50.3 %    MCV 99.0 (H) 79.6 - 97.8 FL    MCH 32.1 26.1 - 32.9 PG    MCHC 32.5 31.4 - 35.0 g/dL    RDW 13.4 11.9 - 14.6 %    PLATELET 523 060 - 355 K/uL    MPV 9.1 (L) 9.4 - 12.3 FL    ABSOLUTE NRBC 0.00 0.0 - 0.2 K/uL    DF AUTOMATED      NEUTROPHILS 74 43 - 78 %    LYMPHOCYTES 18 13 - 44 %    MONOCYTES 5 4.0 - 12.0 %    EOSINOPHILS 3 0.5 - 7.8 %    BASOPHILS 0 0.0 - 2.0 %    IMMATURE GRANULOCYTES 0 0.0 - 5.0 %    ABS. NEUTROPHILS 5.4 1.7 - 8.2 K/UL    ABS. LYMPHOCYTES 1.3 0.5 - 4.6 K/UL    ABS. MONOCYTES 0.4 0.1 - 1.3 K/UL    ABS. EOSINOPHILS 0.2 0.0 - 0.8 K/UL    ABS. BASOPHILS 0.0 0.0 - 0.2 K/UL    ABS. IMM.  GRANS. 0.0 0.0 - 0.5 K/UL       All Micro Results     None          No current facility-administered medications for this encounter. Current Outpatient Medications   Medication Sig    cyanocobalamin 1,000 mcg tablet Take 1,000 mcg by mouth daily.  ascorbic acid (VITAMIN C) 1,000 mg tablet Take 1,000 mg by mouth every morning.  multivitamin (ONE A DAY) tablet Take 1 Tab by mouth every morning. Other Studies:  No results found.     Assessment and Plan:     Hospital Problems as of 9/21/2020 Date Reviewed: 8/17/2020          Codes Class Noted - Resolved POA    GI bleed ICD-10-CM: K92.2  ICD-9-CM: 578.9  2/15/2020 - Present Unknown        Gastroesophageal reflux disease ICD-10-CM: K21.9  ICD-9-CM: 530.81  12/19/2018 - Present Yes            GI Bleed  Hemodynamically stable at this time  Hgb:12.4    History of several GI bleeds  Most likely aggravated by usage of Aleve for the last several days    Plan:  -H&H every 8  -Protonix 40 mg IV every 12  -Transfuse for hemoglobin <7  -N.p.o.  -IVF  -Gastroenterology consult    GERD  -Protonix     Discharge planning: Anticipate discharge to home  DVT ppx ordered  Code status:  Full  Estimated LOS:  Greater than 2 midnights  Risk:  high    Signed:  Sandra Weber MD

## 2020-09-21 NOTE — CONSULTS
[facility] Consult Note       Consulting GI Physician: Dr. Magalene Rubinstein    Referring Provider:  Dr. Thomas Iyer Date:  9/21/2020    Admit Date:  9/21/2020    Chief Complaint:  Rectal bleeding    Subjective:     History of Present Illness:  Patient is a 47 y. o. male with PMH of etoh abuse, anxiety, cardiomyopathy, HF with EF of 50-55% in 11/2018, diverticulosis s/p colon resection in 2008, GERD, s/p Nissen, HTN, being seen in GI consultation at the request of Dr. iMke Young for GI bleeding. He presented to [facility] ER early morning of 9/21/20 with c/o rectal bleeding and mild lightheadedness with Hgb 12.4, normal WBC, BUN/Cr. He reports feeling an urge to have a BM around 3AM this morning. He was able to have a BM and then the commode was full with a moderate amount of red blood and he had mild lightheadedness though he gets uneasy at the sight of blood. He denies abdominal pain, fevers. He was having regular bowel patterns leading to this admission with exception of mild straining to pass stool yesterday afternoon. He denies N/V. Appetite and weight have been stable. He is not on any blood thinners. He does report that he had been taking Ibuprofen and more recent he had been taking Aleve at least twice daily for the past several days. He denies tobacco use. No ETOH use for at least 5yrs. He was admitted at least twice in 2/2020 for GI bleeding. Colonoscopy 2/24/20 showed No active bleeding, and then diverticulosis was noted throughout but mostly in the left side. Exact location of recent bleeding then was not identified. His last EGD 1/9/2013 for GERD revealed HH.        PMH:  Past Medical History:   Diagnosis Date    [facility]ism [facility]al[facility] ([facility] [address]     Anxiety     Cardiomyopathy ([facility] [address] 6/30/2016    Diverticular disease     colon resection with no symptoms since surgery    Essential hypertension     managed with med.       GERD (gastroesophageal reflux disease)     takes medication but not altogether relieved    Hiatal hernia with gastroesophageal reflux 2013    Hypertension     managed with med. PSH:  Past Surgical History:   Procedure Laterality Date    COLONOSCOPY N/A 2020    COLONOSCOPY ROOM 517 performed by Solomon Izaguirre MD at [address] HX COLECTOMY  2008    HX COLONOSCOPY  3/2018,     10 years    HX CYST REMOVAL  2017    renal cyst    HX HERNIA REPAIR      Juan Manuel fundiplication       Allergies: Allergies   Allergen Reactions    Pcn [Penicillins] Hives       Home Medications:  Prior to Admission medications    Medication Sig Start Date End Date Taking? Authorizing Provider   B.infantis-B.ani-B.long-B.bifi (Probiotic 4X) 10-15 mg TbEC Take 1 Tab by mouth daily. Yes Provider, Historical   cyanocobalamin 1,000 mcg tablet Take 1,000 mcg by mouth daily. Yes Provider, Historical   ascorbic acid (VITAMIN C) 1,000 mg tablet Take 1,000 mg by mouth every morning. Yes Provider, Historical   multivitamin (ONE A DAY) tablet Take 1 Tab by mouth every morning.      Yes Provider, Historical       Hospital Medications:  Current Facility-Administered Medications   Medication Dose Route Frequency    cyanocobalamin tablet 1,000 mcg  1,000 mcg Oral DAILY    multivitamin, stress formula (STRESS TAB) tablet 1 Tab  1 Tab Oral DAILY    sodium chloride (NS) flush 5-40 mL  5-40 mL IntraVENous Q8H    sodium chloride (NS) flush 5-40 mL  5-40 mL IntraVENous PRN    ondansetron (ZOFRAN) injection 4 mg  4 mg IntraVENous Q4H PRN    pantoprazole (PROTONIX) 40 mg in 0.9% sodium chloride 10 mL injection  40 mg IntraVENous Q12H    lactated Ringers infusion  100 mL/hr IntraVENous CONTINUOUS       Social History:  Social History     Tobacco Use    Smoking status: Former Smoker     Years: 20.00     Last attempt to quit: 2015     Years since quittin.8    Smokeless tobacco: Never Used   Substance Use Topics    Alcohol use: No     Alcohol/week: 0.0 standard drinks Comment: last drink 3/2015 sober       Family History:  Family History   Problem Relation Age of Onset    Cancer Father         brain    Breast Cancer Mother     No Known Problems Brother        Review of Systems:  A detailed 10 system ROS is obtained, with pertinent positives as listed above. All others are negative. Diet:  NPO    Objective:     Physical Exam:  Vitals:  Visit Vitals  /67 (BP 1 Location: Right arm, BP Patient Position: At rest)   Pulse 70   Temp 98 °F (36.7 °C)   Resp 18   Ht 5' 8\" (1.727 m)   Wt 86.2 kg (190 lb)   SpO2 95%   BMI 28.89 kg/m²     Gen:  Pt is alert, cooperative, no acute distress  Skin:  Face reveal no rashes. HEENT: Sclerae anicteric. Cardiovascular: Regular rate and rhythm. Respiratory:  Comfortable breathing with no accessory muscle use. Clear breath sounds anteriorly with no wheezes, rales, or rhonchi. GI:  Abdomen nondistended, soft. +Trace LLQ ttp. No guarding. Normal active bowel sounds. No obvious masses palpable. Rectal:  Deferred  Musculoskeletal:  No pitting edema of the lower legs. Neurological:  Gross memory appears intact. Patient is alert and oriented. Psychiatric:  Mood appears appropriate with judgement intact. Laboratory:    Recent Labs     09/21/20  0405   WBC 7.4   HGB 12.3*   HCT 37.9*      MCV 99.0*      K 4.5   *   CO2 28   BUN 23   CREA 0.72*   CA 7.9*   *   AP 39*   ALT 21   TBILI 0.3   ALB 3.0*   TP 6.1*        Colonoscopy 2/24/20    Findings: No active bleeding, diverticulosis noted throughout but mostly in the left side. Exact location of recent bleeding not identified. No Small internal hemorrhoids noted. Specimens: None  Estimated Blood Loss: 0 cc  Impression:   No active bleeding. Diverticulosis and small internal hemorrhoids. Assessment:     Active Problems:    Gastroesophageal reflux disease (12/19/2018)      GI bleed (2/15/2020)      47 y. o. male with PMH of etoh abuse, anxiety, cardiomyopathy, HF with EF of 50-55% in 11/2018, diverticulosis s/p colon resection in 2008, GERD, s/p Nissen, HTN, being seen in GI consultation at the request of Dr. Ju Heard for GI bleeding. He was admitted at least twice in 2/2020 for GI bleeding. Colonoscopy 2/24/20 showed No active bleeding, and then diverticulosis was noted throughout but mostly in the left side. Exact location of recent bleeding then was not identified. His last EGD 1/9/2013 for GERD revealed HH.       Likely source of his bleed is diverticular and this was discussed with him and his wife. Would also consider possible anastomotic ulceration with history of colectomy. There is less concern for colon polyp/neoplasm given his colon evaluation as recent as 2/2020. Plan:      - Follow HGB and transfuse as needed. - Monitor for recurrent GI bleeding  - Supportive care  - Ok for clear liquid, nothing red diet today. - Consider NM GI bleed scan if recurrent GI bleeding. Roberto Mahajan PA-C  [facility]    ATTENDING NOTE:  I have seen the patient and agree with the above assessment and plan. I discussed with patient at length the natural history of diverticular hemorrhage as well as the possible role for a surgical strategy. I have recommended supportive care during this episode. I have advised patient to avoid use of NSAIDs after discharge and maintained a high fiber diet to potentially reduce risk of future episodes. If significant recurrence of large volume hematochezia, will check a tagged scan and consider repeat colonoscopy versus flexible sigmoidoscopy.       Kallie Dumas MD

## 2020-09-21 NOTE — PROGRESS NOTES
Chart screened by  for discharge planning. No needs identified at this time. PT/OT has not been consulted. Patient will likely discharge home when medically stable. Please consult or notify  if any new issues arise. CM continues to monitor the patient during this hospitalization .      Care Management Interventions  Transition of Care Consult (CM Consult): Discharge Planning  Discharge Durable Medical Equipment: No  Physical Therapy Consult: No  Occupational Therapy Consult: No  Speech Therapy Consult: No  Current Support Network: Own Home  Confirm Follow Up Transport: Self  Discharge Location  Discharge Placement: Home

## 2020-09-21 NOTE — INTERDISCIPLINARY ROUNDS
Interdisciplinary Rounds completed. Nursing, Case Management, and Physician  present. Plan of care reviewed and updated. GI following.

## 2020-09-21 NOTE — ED PROVIDER NOTES
Patient is a pleasant 59-year-old male with a known history of diverticular bleeds presenting to the emergency department this morning secondary to acute GI bleed. The patient said that back in March when he was in the hospital colonoscopy was done at that time there is no active bleeding so no banding or procedure was done. The patient did have to receive blood transfusions twice during that episode. The patient has been doing well since then. He said he got a little lightheaded after the episode of bleeding this morning but denied any syncopal event. The patient is not having any fevers or chills and has not had any abdominal pain. \"I got up this morning and thought he needed to have diarrhea but then realized it was all just blood. \"           Past Medical History:   Diagnosis Date    [facility]ism /alcohol abuse ([facility] [address]     Anxiety     Cardiomyopathy ([facility] [address] 6/30/2016    Diverticular disease     colon resection with no symptoms since surgery    Essential hypertension     managed with med.  GERD (gastroesophageal reflux disease)     takes medication but not altogether relieved    Hiatal hernia with gastroesophageal reflux 2/5/2013    Hypertension     managed with med.         Past Surgical History:   Procedure Laterality Date    COLONOSCOPY N/A 2/24/2020    COLONOSCOPY ROOM 517 performed by Shoshana [facility] at [facility] ENDOSCOPY    HX COLECTOMY  4/2008    HX COLONOSCOPY  3/2018, 2008    10 years    HX CYST REMOVAL  2017    renal cyst    HX HERNIA REPAIR      Juan Manuel fundiplication         Family History:   Problem Relation Age of Onset    Cancer Father         brain    Breast Cancer Mother     No Known Problems Brother        Social History     Socioeconomic History    Marital status:      Spouse name: Not on file    Number of children: Not on file    Years of education: Not on file    Highest education level: Not on file   Occupational History    Not on file   Social Needs    Financial resource strain: Not on file    Food insecurity     Worry: Not on file     Inability: Not on file    Transportation needs     Medical: Not on file     Non-medical: Not on file   Tobacco Use    Smoking status: Former Smoker     Years: 20.00     Last attempt to quit: 2015     Years since quittin.8    Smokeless tobacco: Never Used   Substance and Sexual Activity    Alcohol use: No     Alcohol/week: 0.0 standard drinks     Comment: last drink 3/2015 sober    Drug use: No    Sexual activity: Yes     Partners: Female   Lifestyle    Physical activity     Days per week: Not on file     Minutes per session: Not on file    Stress: Not on file   Relationships    Social connections     Talks on phone: Not on file     Gets together: Not on file     Attends Mosque service: Not on file     Active member of club or organization: Not on file     Attends meetings of clubs or organizations: Not on file     Relationship status: Not on file    Intimate partner violence     Fear of current or ex partner: Not on file     Emotionally abused: Not on file     Physically abused: Not on file     Forced sexual activity: Not on file   Other Topics Concern    Not on file   Social History Narrative    Not on file         ALLERGIES: Pcn [penicillins]    Review of Systems   All other systems reviewed and are negative. Vitals:    20 0400 20 0414   BP: 119/68 127/77   Pulse: 73 76   Resp: 16    Temp: 98.2 °F (36.8 °C)    SpO2: 99% 97%   Weight: 86.2 kg (190 lb)    Height: 5' 8\" (1.727 m)             Physical Exam     GENERAL:The patient is well-nourished, and well-hydrated. No acute distress  VITAL SIGNS: Heart rate, blood pressure, respiratory rate reviewed as recorded in nurse's notes  EYES: Pupils reactive. Extraocular motion intact. No conjunctival redness or drainage.   LUNGS: Breath sounds clear and equal bilaterally no accessory muscle use  CHEST: No deformity  CARDIOVASCULAR: Regular rate and rhythm  ABDOMEN: Soft without tenderness. No palpable masses or organomegaly. No  peritoneal signs. No rigidity. RECTAL: Bright red blood per rectum with positive fecal occult  NEUROLOGIC: Sensation is grossly intact. Cranial nerve exam reveals face is  symmetrical, tongue is midline speech is clear. SKIN: No rash or petechiae. Good skin turgor palpated.       MDM  Number of Diagnoses or Management Options  Diagnosis management comments: Viral infection, gastroenteritis, viral adenitis, pseudomembranous colitis, inflammatory  bowel disease, infectious diarrhea    Abdominal wall pain,     Constipation, fecal impaction, small bowel obstruction, partial small bowel obstruction,  Ileus    UTI, pyelonephritis, renal colic, ureteral stone     Peptic ulcer disease, esophagitis, GERD    Pancreatitis, pancreatic pseudocyst,    hepatic cirrhosis, GI bleed, esophageal varices, poisoning,    gallbladder disease, cholecystitis, diverticulitis, appendicitis, appendicitis with rupture,    ingestion of foreign material         Amount and/or Complexity of Data Reviewed  Clinical lab tests: ordered and reviewed  Tests in the radiology section of CPT®: ordered and reviewed  Tests in the medicine section of CPT®: reviewed and ordered  Obtain history from someone other than the patient: yes  Review and summarize past medical records: yes  Discuss the patient with other providers: yes  Independent visualization of images, tracings, or specimens: yes      ED Course as of Sep 21 0512   Mon Sep 21, 2020   0511 I spoke to the hospitalist about the findings in the emergency department on physical exam and need for admission he came directly to the ER for evaluation of the patient plans on admitting him to the hospital.  He will consult GI.    [KH]      ED Course User Index  [KH] Rosanna Deluca DO       Procedures

## 2020-09-21 NOTE — ED TRIAGE NOTES
BIB [facility] from home- pt woke up an hour ago with the sensation of diarrhea- pt noticed bright red blood in the toilet. Following, pt did not experience any pain, dizziness, SHob, n/v. No issues since. Initial /60. EMS got 100/64 HR 64 99% RA  temp 97.9. Last /78. Received 1L NS through 18LAC. Pt is asymptomatic at this time. Pt says he had a GI bleed back in February and was admitted here, he says he coded 2 times during that stay. Pt does not take any medications other than vitamins.

## 2020-09-21 NOTE — PROGRESS NOTES
Hourly rounds performed. Pt had 3 bloody bm's this shift. HGB 11.1 @ 1211, next check @ 2011    Will continue to monitor.

## 2020-09-22 ENCOUNTER — APPOINTMENT (OUTPATIENT)
Dept: NUCLEAR MEDICINE | Age: 55
DRG: 378 | End: 2020-09-22
Attending: FAMILY MEDICINE
Payer: COMMERCIAL

## 2020-09-22 LAB
HCT VFR BLD AUTO: 26.3 % (ref 41.1–50.3)
HCT VFR BLD AUTO: 28.4 % (ref 41.1–50.3)
HCT VFR BLD AUTO: 32 % (ref 41.1–50.3)
HGB BLD-MCNC: 10.5 G/DL (ref 13.6–17.2)
HGB BLD-MCNC: 8.7 G/DL (ref 13.6–17.2)
HGB BLD-MCNC: 9.5 G/DL (ref 13.6–17.2)

## 2020-09-22 PROCEDURE — 78278 ACUTE GI BLOOD LOSS IMAGING: CPT

## 2020-09-22 PROCEDURE — 36415 COLL VENOUS BLD VENIPUNCTURE: CPT

## 2020-09-22 PROCEDURE — 65660000000 HC RM CCU STEPDOWN

## 2020-09-22 PROCEDURE — 74011000250 HC RX REV CODE- 250: Performed by: INTERNAL MEDICINE

## 2020-09-22 PROCEDURE — C9113 INJ PANTOPRAZOLE SODIUM, VIA: HCPCS | Performed by: INTERNAL MEDICINE

## 2020-09-22 PROCEDURE — 74011250637 HC RX REV CODE- 250/637: Performed by: INTERNAL MEDICINE

## 2020-09-22 PROCEDURE — 85018 HEMOGLOBIN: CPT

## 2020-09-22 PROCEDURE — 74011250636 HC RX REV CODE- 250/636: Performed by: INTERNAL MEDICINE

## 2020-09-22 RX ORDER — SODIUM CHLORIDE 0.9 % (FLUSH) 0.9 %
10 SYRINGE (ML) INJECTION
Status: COMPLETED | OUTPATIENT
Start: 2020-09-22 | End: 2020-09-22

## 2020-09-22 RX ADMIN — Medication 30 ML: at 19:24

## 2020-09-22 RX ADMIN — CYANOCOBALAMIN TAB 1000 MCG 1000 MCG: 1000 TAB at 10:11

## 2020-09-22 RX ADMIN — SODIUM CHLORIDE, SODIUM LACTATE, POTASSIUM CHLORIDE, AND CALCIUM CHLORIDE 100 ML/HR: 600; 310; 30; 20 INJECTION, SOLUTION INTRAVENOUS at 11:07

## 2020-09-22 RX ADMIN — Medication 10 ML: at 21:02

## 2020-09-22 RX ADMIN — SODIUM CHLORIDE, SODIUM LACTATE, POTASSIUM CHLORIDE, AND CALCIUM CHLORIDE 100 ML/HR: 600; 310; 30; 20 INJECTION, SOLUTION INTRAVENOUS at 01:47

## 2020-09-22 RX ADMIN — PANTOPRAZOLE SODIUM 40 MG: 40 INJECTION, POWDER, FOR SOLUTION INTRAVENOUS at 21:01

## 2020-09-22 RX ADMIN — B-COMPLEX W/ C & FOLIC ACID TAB 1 TABLET: TAB at 10:11

## 2020-09-22 RX ADMIN — PANTOPRAZOLE SODIUM 40 MG: 40 INJECTION, POWDER, FOR SOLUTION INTRAVENOUS at 10:10

## 2020-09-22 RX ADMIN — Medication 10 ML: at 13:09

## 2020-09-22 RX ADMIN — Medication 10 ML: at 06:15

## 2020-09-22 NOTE — PROGRESS NOTES
Progress Note    Patient: Brii Doran MRN: [id number]  SSN: [id number]    YOB: 1965  Age: 54 y.o. Sex: male      Admit Date: 9/21/2020    LOS: 1 day     Subjective:   F/U Gi bleeding. \"55 yo hx of GI bleeding, diverticulitis (s/p partial colon resection), ), and GERD (s/p Nissen fundoplication) presents to the ER with chief complaint of bloody stools. Patient reports he woke up earlier during the night and felt like he had to go to the bathroom. He reports when he got to the bathroom he passed a large amount of stool with a large amount of bright red blood. \" Does admit to taking more Aleve lately due to back pain. GI consulted who recommended to observe since still with persistent bright red blood in stool. Recommended high fiber diet and to avoid NSAIDs. Recommended to consider NM GI bleeding scan if large volume hematochezia seen. No chest pain or SOB. States he is feeling more weak compared to yesterday.      Current Facility-Administered Medications   Medication Dose Route Frequency    cyanocobalamin tablet 1,000 mcg  1,000 mcg Oral DAILY    multivitamin, stress formula (STRESS TAB) tablet 1 Tab  1 Tab Oral DAILY    sodium chloride (NS) flush 5-40 mL  5-40 mL IntraVENous Q8H    sodium chloride (NS) flush 5-40 mL  5-40 mL IntraVENous PRN    ondansetron (ZOFRAN) injection 4 mg  4 mg IntraVENous Q4H PRN    pantoprazole (PROTONIX) 40 mg in 0.9% sodium chloride 10 mL injection  40 mg IntraVENous Q12H    lactated Ringers infusion  100 mL/hr IntraVENous CONTINUOUS       Objective:     Vitals:    09/22/20 0428 09/22/20 0838 09/22/20 1213 09/22/20 1518   BP: 94/60 111/68 120/79 107/72   Pulse: 73 74 86 91   Resp: 19 18 18 18   Temp: 97.3 °F (36.3 °C) 98.1 °F (36.7 °C) 97.9 °F (36.6 °C) 97.4 °F (36.3 °C)   SpO2: 97% 99% 99% 100%   Weight:       Height:             Intake and Output:  Current Shift: 09/22 0701 - 09/22 1900  In: -   Out: 2 [Urine:1]  Last three shifts: No intake/output data recorded. Physical Exam:   General:  Alert, cooperative, no distress, appears stated age. Eyes:  Conjunctivae/corneas clear. Ears:  Normal TMs and external ear canals both ears. Nose: Nares normal. Septum midline. Mouth/Throat: Lips, mucosa, and tongue normal.    Neck:  no JVD. Back:   deferred   Lungs:   Clear to auscultation bilaterally. Heart:  Regular rate and rhythm, S1, S2 normal   Abdomen:   Soft, non-tender. Bowel sounds normal.    Extremities: Extremities normal, atraumatic, no cyanosis or edema. Pulses: 2+ and symmetric all extremities. Skin: Skin color, texture, turgor normal. No rashes or lesions   Lymph nodes: Cervical, supraclavicular, and axillary nodes normal.   Neurologic: CNII-XII intact. Normal strength, sensation and reflexes throughout. Lab/Data Review:    Recent Results (from the past 24 hour(s))   HGB & HCT    Collection Time: 09/21/20  8:38 PM   Result Value Ref Range    HGB 9.7 (L) 13.6 - 17.2 g/dL    HCT 30.2 (L) 41.1 - 50.3 %   HGB & HCT    Collection Time: 09/22/20  4:50 AM   Result Value Ref Range    HGB 9.5 (L) 13.6 - 17.2 g/dL    HCT 28.4 (L) 41.1 - 50.3 %   HGB & HCT    Collection Time: 09/22/20 12:29 PM   Result Value Ref Range    HGB 10.5 (L) 13.6 - 17.2 g/dL    HCT 32.0 (L) 41.1 - 50.3 %       Assessment/ Plan: Active Problems:    Gastroesophageal reflux disease (12/19/2018)      GI bleed (2/15/2020)    Gi bleeding - PPI BID. GI following. Trend H&H. Bleeding suspected from diverticular disease but if bleeding worsens, will need NM scan. Possible dc in 1-2 days.      DVT prophylaxis - SCDs      Signed By: Jorge L Leon DO     September 22, 2020

## 2020-09-22 NOTE — PROGRESS NOTES
Hourly rounds complete this shift, no new complaints at this time, No reports of bloody BM this evening shift Hgb down from 11.1 - 9.7-  9.5 : bed in low, locked position, call light and bedside table within reach,  all needs met. Will continue to monitor Report to day shift nurse.

## 2020-09-22 NOTE — PROGRESS NOTES
Telemetry room called stating pt HR went from 130 to 140. Pt was in bathroom at the time. Pt had one bloody formed BM. Made Dr Felicia Bacon aware. No orders received. Will continue to monitor.

## 2020-09-22 NOTE — PROGRESS NOTES
pt had another bloody BM. this BM is more bloddy than the other BM today. pt also asked for something to help him sleep. Dr Kaci Velez made aware. Dr Kaci Velez to order bleeding scan. Per Dr Kaci Velez can not give anything to help with sleep due to bleeding. Pt an wife made aware of all information. Will continue to monitor. Dr estrada also made aware of BM's have increased in blood. Dr estrada states he is in agreement with plan for bleeding scan.

## 2020-09-22 NOTE — PROGRESS NOTES
[facility] Progress Note         Admit Date:  9/21/2020    Today's Date:  9/22/2020    CC:  GI bleed    Subjective:     Patient reports having a total of 3 BMs yesterday each containing red blood though volume of blood appeared to be decreasing. His last BM was at 6pm last night. He feels like he may need to have another BM soon. He denies abdominal pain, N/V. He has been tolerating clear liquids. He was confused as to why he had an NPO after midnight order for this morning. He was unaware of this and did drink liquids overnight and into this morning. Medications:   Current Facility-Administered Medications   Medication Dose Route Frequency    cyanocobalamin tablet 1,000 mcg  1,000 mcg Oral DAILY    multivitamin, stress formula (STRESS TAB) tablet 1 Tab  1 Tab Oral DAILY    sodium chloride (NS) flush 5-40 mL  5-40 mL IntraVENous Q8H    sodium chloride (NS) flush 5-40 mL  5-40 mL IntraVENous PRN    ondansetron (ZOFRAN) injection 4 mg  4 mg IntraVENous Q4H PRN    pantoprazole (PROTONIX) 40 mg in 0.9% sodium chloride 10 mL injection  40 mg IntraVENous Q12H    lactated Ringers infusion  100 mL/hr IntraVENous CONTINUOUS       Review of Systems:  ROS was obtained, with pertinent positives as listed above. No chest pain or SOB. Diet:  NPO    Objective:   Vitals:  Visit Vitals  /68 (BP 1 Location: Right arm, BP Patient Position: At rest)   Pulse 74   Temp 98.1 °F (36.7 °C)   Resp 18   Ht 5' 8\" (1.727 m)   Wt 86.2 kg (190 lb)   SpO2 99%   BMI 28.89 kg/m²     Intake/Output:  No intake/output data recorded. No intake/output data recorded. Exam:  General appearance: alert, cooperative, no distress  Lungs: clear to auscultation bilaterally anteriorly  Heart: regular rate and rhythm  Abdomen: soft, non-tender.  Bowel sounds normal. No masses, no organomegaly  Extremities: extremities normal, atraumatic, no cyanosis or edema  Neuro:  alert and oriented    Data Review (Labs):    Recent Labs 09/22/20  0450 09/21/20  2038 09/21/20  1211 09/21/20  0405   WBC  --   --   --  7.4   HGB 9.5* 9.7* 11.1* 12.3*   HCT 28.4* 30.2* 34.4* 37.9*   PLT  --   --   --  184   MCV  --   --   --  99.0*   NA  --   --   --  143   K  --   --   --  4.5   CL  --   --   --  114*   CO2  --   --   --  28   BUN  --   --   --  23   CREA  --   --   --  0.72*   CA  --   --   --  7.9*   GLU  --   --   --  118*   AP  --   --   --  39*   ALT  --   --   --  21   TBILI  --   --   --  0.3   ALB  --   --   --  3.0*   TP  --   --   --  6.1*     Colonoscopy 2/24/20    Findings: No active bleeding, diverticulosis noted throughout but mostly in the left side. Exact location of recent bleeding not identified. No Small internal hemorrhoids noted. Specimens: None  Estimated Blood Loss: 0 cc  Impression:   No active bleeding.  Diverticulosis and small internal hemorrhoids. Assessment:     Active Problems:    Gastroesophageal reflux disease (12/19/2018)      GI bleed (2/15/2020)      47 y. o. male with PMH of etoh abuse, anxiety, cardiomyopathy, HF with EF of 50-55% in 11/2018, diverticulosis s/p colon resection in 2008, GERD, s/p Nissen, HTN, seen in GI consultation 9/21/20 for GI bleeding. He has continued to pass BRBPR though volume appears to be decreasing and Hgb did drop since admission though now seems to have stabilized at mid 9. He was admitted at least twice in 2/2020 for GI bleeding. Colonoscopy 2/24/20 showed No active bleeding, and then diverticulosis was noted throughout but mostly in the left side. Exact location of recent bleeding then was not identified. His last EGD 1/9/2013 for GERD revealed HH.       Likely source of his bleed is diverticular and natural history of diverticular hemorrhage was discussed with him and his wife at timeo f consult as well as the possible role for surgical referral. Would also consider possible anastomotic ulceration with history of colectomy.   There is less concern for colon polyp/neoplasm given his colon evaluation as recent as 2/2020. Plan:      - Follow HGB and transfuse as needed. - Monitor for recurrent GI bleeding  - Continue supportive care  - Ok for clear liquid, nothing red diet today. Advised importance of maintaining a high fiber diet following discharge to potentially reduce the risk of future episodes. - Advised pt to avoid use of NSAIDs after discharge  - If recurrence of large volume hematochezia would consider NM GI bleed scan as well as possible repeat Colonoscopy vs Flexible sigmoidoscopy.     Enedelia Jurado PA-C  [facility]    ATTENDING NOTE:  I have seen the patient and agree with the above assessment and plan. This morning patient had 1 blood tinged stool but subsequently experienced a larger episode of hematochezia. His hemoglobin has improved and overall his bleeding has decreased. I recommended continued observation overnight. If significant worsening of bleeding, would check a tagged blood cell scan.      Raven Smallwood MD

## 2020-09-22 NOTE — PROGRESS NOTES
monitor room called again stating pt has had several runs of tachycardia. Seems to be happening when pt ambulates to the bathroom. Dr Edu Lynn made aware. No orders received.

## 2020-09-23 ENCOUNTER — APPOINTMENT (OUTPATIENT)
Dept: CT IMAGING | Age: 55
DRG: 378 | End: 2020-09-23
Attending: RADIOLOGY
Payer: COMMERCIAL

## 2020-09-23 ENCOUNTER — APPOINTMENT (OUTPATIENT)
Dept: PHYSICAL THERAPY | Age: 55
End: 2020-09-23
Payer: COMMERCIAL

## 2020-09-23 LAB
CREAT BLD-MCNC: 0.6 MG/DL (ref 0.8–1.5)
HCT VFR BLD AUTO: 23.3 % (ref 41.1–50.3)
HCT VFR BLD AUTO: 25.2 % (ref 41.1–50.3)
HCT VFR BLD AUTO: 25.9 % (ref 41.1–50.3)
HCT VFR BLD AUTO: 26.8 % (ref 41.1–50.3)
HGB BLD-MCNC: 7.6 G/DL (ref 13.6–17.2)
HGB BLD-MCNC: 8.3 G/DL (ref 13.6–17.2)
HGB BLD-MCNC: 8.5 G/DL (ref 13.6–17.2)
HGB BLD-MCNC: 8.8 G/DL (ref 13.6–17.2)
HISTORY CHECKED?,CKHIST: NORMAL

## 2020-09-23 PROCEDURE — 65660000000 HC RM CCU STEPDOWN

## 2020-09-23 PROCEDURE — C9113 INJ PANTOPRAZOLE SODIUM, VIA: HCPCS | Performed by: INTERNAL MEDICINE

## 2020-09-23 PROCEDURE — 74011250637 HC RX REV CODE- 250/637: Performed by: INTERNAL MEDICINE

## 2020-09-23 PROCEDURE — 2709999900 HC NON-CHARGEABLE SUPPLY

## 2020-09-23 PROCEDURE — 82565 ASSAY OF CREATININE: CPT

## 2020-09-23 PROCEDURE — 74011000250 HC RX REV CODE- 250: Performed by: INTERNAL MEDICINE

## 2020-09-23 PROCEDURE — 36430 TRANSFUSION BLD/BLD COMPNT: CPT

## 2020-09-23 PROCEDURE — 74011250636 HC RX REV CODE- 250/636: Performed by: INTERNAL MEDICINE

## 2020-09-23 PROCEDURE — 74011000636 HC RX REV CODE- 636: Performed by: FAMILY MEDICINE

## 2020-09-23 PROCEDURE — 74178 CT ABD&PLV WO CNTR FLWD CNTR: CPT

## 2020-09-23 PROCEDURE — 36415 COLL VENOUS BLD VENIPUNCTURE: CPT

## 2020-09-23 PROCEDURE — 85018 HEMOGLOBIN: CPT

## 2020-09-23 PROCEDURE — P9016 RBC LEUKOCYTES REDUCED: HCPCS

## 2020-09-23 RX ORDER — BISACODYL 5 MG
10 TABLET, DELAYED RELEASE (ENTERIC COATED) ORAL ONCE
Status: COMPLETED | OUTPATIENT
Start: 2020-09-23 | End: 2020-09-23

## 2020-09-23 RX ORDER — SODIUM CHLORIDE 0.9 % (FLUSH) 0.9 %
10 SYRINGE (ML) INJECTION
Status: ACTIVE | OUTPATIENT
Start: 2020-09-23 | End: 2020-09-23

## 2020-09-23 RX ORDER — POLYETHYLENE GLYCOL 3350 17 G/17G
238 POWDER, FOR SOLUTION ORAL ONCE
Status: COMPLETED | OUTPATIENT
Start: 2020-09-23 | End: 2020-09-23

## 2020-09-23 RX ORDER — SODIUM CHLORIDE 9 MG/ML
250 INJECTION, SOLUTION INTRAVENOUS AS NEEDED
Status: DISCONTINUED | OUTPATIENT
Start: 2020-09-23 | End: 2020-09-25 | Stop reason: HOSPADM

## 2020-09-23 RX ADMIN — B-COMPLEX W/ C & FOLIC ACID TAB 1 TABLET: TAB at 08:36

## 2020-09-23 RX ADMIN — Medication 10 ML: at 05:09

## 2020-09-23 RX ADMIN — PANTOPRAZOLE SODIUM 40 MG: 40 INJECTION, POWDER, FOR SOLUTION INTRAVENOUS at 21:31

## 2020-09-23 RX ADMIN — Medication 10 ML: at 13:45

## 2020-09-23 RX ADMIN — Medication 5 ML: at 21:32

## 2020-09-23 RX ADMIN — PANTOPRAZOLE SODIUM 40 MG: 40 INJECTION, POWDER, FOR SOLUTION INTRAVENOUS at 08:42

## 2020-09-23 RX ADMIN — IOPAMIDOL 100 ML: 755 INJECTION, SOLUTION INTRAVENOUS at 10:57

## 2020-09-23 RX ADMIN — POLYETHYLENE GLYCOL 3350 238 G: 17 POWDER, FOR SOLUTION ORAL at 16:43

## 2020-09-23 RX ADMIN — BISACODYL 10 MG: 5 TABLET, COATED ORAL at 14:24

## 2020-09-23 RX ADMIN — CYANOCOBALAMIN TAB 1000 MCG 1000 MCG: 1000 TAB at 08:36

## 2020-09-23 NOTE — PROGRESS NOTES
Progress Note    Patient: Ramya Arora MRN: [id number]  SSN: [id number]    YOB: 1965  Age: 54 y.o. Sex: male      Admit Date: 9/21/2020    LOS: 2 days     Subjective:   F/U GI bleeding. \"55 yo hx of GI bleeding, diverticulitis (s/p partial colon resection), and GERD (s/p Nissen fundoplication) presents to the ER with chief complaint of bloody stools. Patient reports he woke up earlier during the night and felt like he had to go to the bathroom. He reports when he got to the bathroom he passed a large amount of stool with a large amount of bright red blood. \"    Does admit to taking more Aleve lately due to back pain. GI consulted who recommended to observe since still with persistent bright red blood in stool. Recommended high fiber diet and to avoid NSAIDs. Recommended to consider NM GI bleeding scan if large volume hematochezia seen. On 9/22 noted to have worsening bleeding so NM bleeding scan ordered which showed bleeding at the RLQ. IR consulted who are performing embolization this AM. Received 1 unit PRBC on 9/23. No chest pain or SOB. States he is feeling better compared to yesterday. Last bloody BM 5:30 AM.    Hg improving.    Current Facility-Administered Medications   Medication Dose Route Frequency    0.9% sodium chloride infusion 250 mL  250 mL IntraVENous PRN    bisacodyL (DULCOLAX) tablet 10 mg  10 mg Oral ONCE    polyethylene glycol (MIRALAX) powder 238 g  238 g Oral ONCE    cyanocobalamin tablet 1,000 mcg  1,000 mcg Oral DAILY    multivitamin, stress formula (STRESS TAB) tablet 1 Tab  1 Tab Oral DAILY    sodium chloride (NS) flush 5-40 mL  5-40 mL IntraVENous Q8H    sodium chloride (NS) flush 5-40 mL  5-40 mL IntraVENous PRN    ondansetron (ZOFRAN) injection 4 mg  4 mg IntraVENous Q4H PRN    pantoprazole (PROTONIX) 40 mg in 0.9% sodium chloride 10 mL injection  40 mg IntraVENous Q12H    lactated Ringers infusion  50 mL/hr IntraVENous CONTINUOUS Objective:     Vitals:    09/23/20 0403 09/23/20 0509 09/23/20 0611 09/23/20 0646   BP: 110/63 95/60 102/62 107/66   Pulse: 76 76 75 83   Resp: 16 16 16 18   Temp: 98.2 °F (36.8 °C) 98.6 °F (37 °C) 98.5 °F (36.9 °C) 98.3 °F (36.8 °C)   SpO2: 96% 97% 97% 96%   Weight:       Height:             Intake and Output:  Current Shift: No intake/output data recorded. Last three shifts: 09/21 1901 - 09/23 0700  In: 680 [P.O.:350]  Out: 2 [Urine:1]    Physical Exam:   General:  Alert, cooperative, no distress, good color to face. Eyes:  Conjunctivae/corneas clear. Ears:  Normal TMs and external ear canals both ears. Nose: Nares normal. Septum midline. Mouth/Throat: Lips, mucosa, and tongue normal.    Neck:  no JVD. Back:   deferred   Lungs:   Clear to auscultation bilaterally. Heart:  Regular rate and rhythm, S1, S2 normal   Abdomen:   Soft, non-tender. Bowel sounds normal.    Extremities: Extremities normal, atraumatic, no cyanosis or edema. Pulses: 2+ and symmetric all extremities. Skin: Skin color, texture, turgor normal. No rashes or lesions   Lymph nodes: Cervical, supraclavicular, and axillary nodes normal.   Neurologic: CNII-XII intact. Normal strength, sensation and reflexes throughout. Lab/Data Review:    Recent Results (from the past 24 hour(s))   HGB & HCT    Collection Time: 09/22/20 12:29 PM   Result Value Ref Range    HGB 10.5 (L) 13.6 - 17.2 g/dL    HCT 32.0 (L) 41.1 - 50.3 %   HGB & HCT    Collection Time: 09/22/20  6:32 PM   Result Value Ref Range    HGB 8.7 (L) 13.6 - 17.2 g/dL    HCT 26.3 (L) 41.1 - 50.3 %   HGB & HCT    Collection Time: 09/23/20 12:54 AM   Result Value Ref Range    HGB 7.6 (L) 13.6 - 17.2 g/dL    HCT 23.3 (L) 41.1 - 50.3 %   RBC, ALLOCATE    Collection Time: 09/23/20  2:30 AM   Result Value Ref Range    HISTORY CHECKED?  Historical check performed    HGB & HCT    Collection Time: 09/23/20  7:12 AM   Result Value Ref Range    HGB 8.5 (L) 13.6 - 17.2 g/dL    HCT 25.9 (L) 41.1 - 50.3 %       Assessment/ Plan: Active Problems:    Gastroesophageal reflux disease (12/19/2018)      GI bleed (2/15/2020)    Gi bleeding - PPI BID. GI following. IR to perform embolization this AM. Trend H&H. Possible colonoscopy in the AM tomorrow.      Possible dc in 2 days    DVT prophylaxis - SCDs  Signed By: Olga Moore DO     September 23, 2020

## 2020-09-23 NOTE — PROGRESS NOTES
HRR .  VSS. Currently receiving one unit PRBC. Has not had a BM yet this shift. Resting without c/o. Hourly rounds completed. Resting in bed. Denies needs or pain at this time. Bed in low locked position. Call light and personal items within reach. Will continue to monitor and give report to oncoming day shift nurse.

## 2020-09-23 NOTE — PROGRESS NOTES
Back from [facility] med. No c/o. Wife present and asked what hgb was and I told her 8.7. I had him sign a blood consent to have in chart. Instructed them to call me when he has to get OOB so I can be there when he has a BM. They report he had about 6 BM today; each got increasingly larger with blood.

## 2020-09-23 NOTE — PROGRESS NOTES
Transport here to take pt to IR. Dr. Cornelius Dwyer has plans for egd and colo tomorrow. Pt refusing to go to IR at this time due to gi plans.

## 2020-09-23 NOTE — PROGRESS NOTES
Hourly rounds performed. Pt had two very sm soft bloody bm's before bowel prep started. Pt has now finished bowel prep and is having watery bloody bm's. Will continue to monitor.

## 2020-09-23 NOTE — PROGRESS NOTES
[facility] Progress Note           Date of admission:  9/21/2020    Today's date:  9/23/2020    CC:     GI bleed     HPI:   Patient had another episode of large volume hematochezia last night. ROS:    Gen: No fevers, no chills   CV:   No chest pain, no SOB    Current Medications:     Current Facility-Administered Medications   Medication Dose Route Frequency    0.9% sodium chloride infusion 250 mL  250 mL IntraVENous PRN    cyanocobalamin tablet 1,000 mcg  1,000 mcg Oral DAILY    multivitamin, stress formula (STRESS TAB) tablet 1 Tab  1 Tab Oral DAILY    sodium chloride (NS) flush 5-40 mL  5-40 mL IntraVENous Q8H    sodium chloride (NS) flush 5-40 mL  5-40 mL IntraVENous PRN    ondansetron (ZOFRAN) injection 4 mg  4 mg IntraVENous Q4H PRN    pantoprazole (PROTONIX) 40 mg in 0.9% sodium chloride 10 mL injection  40 mg IntraVENous Q12H    lactated Ringers infusion  50 mL/hr IntraVENous CONTINUOUS       Physical Exam:   Vitals:  Visit Vitals  /66 (BP 1 Location: Right arm, BP Patient Position: At rest)   Pulse 83   Temp 98.3 °F (36.8 °C)   Resp 18   Ht 5' 8\" (1.727 m)   Wt 86.2 kg (190 lb)   SpO2 96%   BMI 28.89 kg/m²     Intake/Output:  No intake/output data recorded.   09/21 1901 - 09/23 0700  In: 680 [P.O.:350]  Out: 2 [Urine:1]    HEENT:  No scleral icterus, no oral ulcers  Abdomen: Soft, nontender, normoactive bowel sounds  Extremities:  No cyanosis, no leg edema  Neuro:  Alert and oriented to person, place, and time    Data:     Recent Results (from the past 24 hour(s))   HGB & HCT    Collection Time: 09/22/20 12:29 PM   Result Value Ref Range    HGB 10.5 (L) 13.6 - 17.2 g/dL    HCT 32.0 (L) 41.1 - 50.3 %   HGB & HCT    Collection Time: 09/22/20  6:32 PM   Result Value Ref Range    HGB 8.7 (L) 13.6 - 17.2 g/dL    HCT 26.3 (L) 41.1 - 50.3 %   HGB & HCT    Collection Time: 09/23/20 12:54 AM   Result Value Ref Range    HGB 7.6 (L) 13.6 - 17.2 g/dL    HCT 23.3 (L) 41.1 - 50.3 %   RBC, ALLOCATE Collection Time: 09/23/20  2:30 AM   Result Value Ref Range    HISTORY CHECKED? Historical check performed    HGB & HCT    Collection Time: 09/23/20  7:12 AM   Result Value Ref Range    HGB 8.5 (L) 13.6 - 17.2 g/dL    HCT 25.9 (L) 41.1 - 50.3 %       Impression/Plan:       59-year-old gentleman with history of cardiomyopathy, partial colectomy for diverticulitis and alcohol abuse, admitted with hematochezia. He was admitted with suspected diverticular bleeding in 2/20. Last EGD in 1/13 revealed hiatal hernia. Following episode of hematochezia last night a tagged red blood cell scan revealed suspected bleeding in the right lower quadrant. This most likely represents diverticular bleeding, but based on persistent episodes, I discussed with patient my recommendation that we prep today for colonoscopy tomorrow to definitively evaluate. Patient understands that it is rare to identify an endoscopically treatable lesion by colonoscopy. Will also perform EGD prior to colonoscopy as this has not been done recently. 1. Clear today. 2. Prep today for EGD and colonoscopy tomorrow. Signed:  Yareli Rosenthal MD  9/23/2020  8:34 AM    Addendum:  IR is agreeable to perform angiogram today with possible embolization. I have discussed this change of plan with the patient and he is agreeable. Pending results will reevaluate need for possible EGD/colonoscopy tomorrow.

## 2020-09-23 NOTE — PROGRESS NOTES
Hgb has dropped to 8.7. Perfect served Dr. Ulices Brownlee a FYI.   Patient is in [facility] med for test

## 2020-09-24 ENCOUNTER — ANESTHESIA EVENT (OUTPATIENT)
Dept: ENDOSCOPY | Age: 55
DRG: 378 | End: 2020-09-24
Payer: COMMERCIAL

## 2020-09-24 ENCOUNTER — ANESTHESIA (OUTPATIENT)
Dept: ENDOSCOPY | Age: 55
DRG: 378 | End: 2020-09-24
Payer: COMMERCIAL

## 2020-09-24 LAB
ABO + RH BLD: NORMAL
ANION GAP SERPL CALC-SCNC: 6 MMOL/L (ref 7–16)
BLD PROD TYP BPU: NORMAL
BLOOD GROUP ANTIBODIES SERPL: NORMAL
BPU ID: NORMAL
BUN SERPL-MCNC: 8 MG/DL (ref 6–23)
CALCIUM SERPL-MCNC: 7.7 MG/DL (ref 8.3–10.4)
CHLORIDE SERPL-SCNC: 113 MMOL/L (ref 98–107)
CO2 SERPL-SCNC: 26 MMOL/L (ref 21–32)
CREAT SERPL-MCNC: 0.62 MG/DL (ref 0.8–1.5)
CROSSMATCH RESULT,%XM: NORMAL
GLUCOSE SERPL-MCNC: 102 MG/DL (ref 65–100)
HCT VFR BLD AUTO: 24 % (ref 41.1–50.3)
HCT VFR BLD AUTO: 25 % (ref 41.1–50.3)
HGB BLD-MCNC: 8 G/DL (ref 13.6–17.2)
HGB BLD-MCNC: 8.2 G/DL (ref 13.6–17.2)
POTASSIUM SERPL-SCNC: 3.4 MMOL/L (ref 3.5–5.1)
SODIUM SERPL-SCNC: 145 MMOL/L (ref 136–145)
SPECIMEN EXP DATE BLD: NORMAL
STATUS OF UNIT,%ST: NORMAL
UNIT DIVISION, %UDIV: 0

## 2020-09-24 PROCEDURE — 76060000032 HC ANESTHESIA 0.5 TO 1 HR: Performed by: INTERNAL MEDICINE

## 2020-09-24 PROCEDURE — 74011250636 HC RX REV CODE- 250/636: Performed by: ANESTHESIOLOGY

## 2020-09-24 PROCEDURE — 36415 COLL VENOUS BLD VENIPUNCTURE: CPT

## 2020-09-24 PROCEDURE — C9113 INJ PANTOPRAZOLE SODIUM, VIA: HCPCS | Performed by: INTERNAL MEDICINE

## 2020-09-24 PROCEDURE — 80048 BASIC METABOLIC PNL TOTAL CA: CPT

## 2020-09-24 PROCEDURE — 85018 HEMOGLOBIN: CPT

## 2020-09-24 PROCEDURE — 76040000026: Performed by: INTERNAL MEDICINE

## 2020-09-24 PROCEDURE — 65660000000 HC RM CCU STEPDOWN

## 2020-09-24 PROCEDURE — 74011000250 HC RX REV CODE- 250: Performed by: INTERNAL MEDICINE

## 2020-09-24 PROCEDURE — 0DJD8ZZ INSPECTION OF LOWER INTESTINAL TRACT, VIA NATURAL OR ARTIFICIAL OPENING ENDOSCOPIC: ICD-10-PCS | Performed by: INTERNAL MEDICINE

## 2020-09-24 PROCEDURE — 74011250636 HC RX REV CODE- 250/636: Performed by: NURSE ANESTHETIST, CERTIFIED REGISTERED

## 2020-09-24 PROCEDURE — 74011250636 HC RX REV CODE- 250/636: Performed by: INTERNAL MEDICINE

## 2020-09-24 PROCEDURE — 2709999900 HC NON-CHARGEABLE SUPPLY

## 2020-09-24 PROCEDURE — 2709999900 HC NON-CHARGEABLE SUPPLY: Performed by: INTERNAL MEDICINE

## 2020-09-24 PROCEDURE — 74011250637 HC RX REV CODE- 250/637: Performed by: FAMILY MEDICINE

## 2020-09-24 PROCEDURE — 74011000250 HC RX REV CODE- 250: Performed by: NURSE ANESTHETIST, CERTIFIED REGISTERED

## 2020-09-24 PROCEDURE — 0DJ08ZZ INSPECTION OF UPPER INTESTINAL TRACT, VIA NATURAL OR ARTIFICIAL OPENING ENDOSCOPIC: ICD-10-PCS | Performed by: INTERNAL MEDICINE

## 2020-09-24 RX ORDER — SODIUM CHLORIDE, SODIUM LACTATE, POTASSIUM CHLORIDE, CALCIUM CHLORIDE 600; 310; 30; 20 MG/100ML; MG/100ML; MG/100ML; MG/100ML
100 INJECTION, SOLUTION INTRAVENOUS CONTINUOUS
Status: DISCONTINUED | OUTPATIENT
Start: 2020-09-24 | End: 2020-09-25 | Stop reason: HOSPADM

## 2020-09-24 RX ORDER — PROPOFOL 10 MG/ML
INJECTION, EMULSION INTRAVENOUS AS NEEDED
Status: DISCONTINUED | OUTPATIENT
Start: 2020-09-24 | End: 2020-09-24 | Stop reason: HOSPADM

## 2020-09-24 RX ORDER — ACETAMINOPHEN 325 MG/1
650 TABLET ORAL
Status: DISCONTINUED | OUTPATIENT
Start: 2020-09-24 | End: 2020-09-25 | Stop reason: HOSPADM

## 2020-09-24 RX ORDER — LIDOCAINE HYDROCHLORIDE 20 MG/ML
INJECTION, SOLUTION EPIDURAL; INFILTRATION; INTRACAUDAL; PERINEURAL AS NEEDED
Status: DISCONTINUED | OUTPATIENT
Start: 2020-09-24 | End: 2020-09-24 | Stop reason: HOSPADM

## 2020-09-24 RX ORDER — SODIUM CHLORIDE, SODIUM LACTATE, POTASSIUM CHLORIDE, CALCIUM CHLORIDE 600; 310; 30; 20 MG/100ML; MG/100ML; MG/100ML; MG/100ML
INJECTION, SOLUTION INTRAVENOUS
Status: DISCONTINUED | OUTPATIENT
Start: 2020-09-24 | End: 2020-09-24 | Stop reason: HOSPADM

## 2020-09-24 RX ORDER — PROPOFOL 10 MG/ML
INJECTION, EMULSION INTRAVENOUS
Status: DISCONTINUED | OUTPATIENT
Start: 2020-09-24 | End: 2020-09-24 | Stop reason: HOSPADM

## 2020-09-24 RX ADMIN — Medication 10 ML: at 15:44

## 2020-09-24 RX ADMIN — PANTOPRAZOLE SODIUM 40 MG: 40 INJECTION, POWDER, FOR SOLUTION INTRAVENOUS at 09:23

## 2020-09-24 RX ADMIN — Medication 10 ML: at 06:07

## 2020-09-24 RX ADMIN — LIDOCAINE HYDROCHLORIDE 100 MG: 20 INJECTION, SOLUTION EPIDURAL; INFILTRATION; INTRACAUDAL; PERINEURAL at 13:08

## 2020-09-24 RX ADMIN — ACETAMINOPHEN 650 MG: 325 TABLET, FILM COATED ORAL at 18:08

## 2020-09-24 RX ADMIN — SODIUM CHLORIDE, SODIUM LACTATE, POTASSIUM CHLORIDE, AND CALCIUM CHLORIDE 100 ML/HR: 600; 310; 30; 20 INJECTION, SOLUTION INTRAVENOUS at 12:44

## 2020-09-24 RX ADMIN — Medication 10 ML: at 22:00

## 2020-09-24 RX ADMIN — PROPOFOL 50 MG: 10 INJECTION, EMULSION INTRAVENOUS at 13:10

## 2020-09-24 RX ADMIN — PROPOFOL 160 MCG/KG/MIN: 10 INJECTION, EMULSION INTRAVENOUS at 13:10

## 2020-09-24 RX ADMIN — PHENYLEPHRINE HYDROCHLORIDE 100 MCG: 10 INJECTION INTRAVENOUS at 13:26

## 2020-09-24 RX ADMIN — PHENYLEPHRINE HYDROCHLORIDE 100 MCG: 10 INJECTION INTRAVENOUS at 13:28

## 2020-09-24 RX ADMIN — SODIUM CHLORIDE, SODIUM LACTATE, POTASSIUM CHLORIDE, AND CALCIUM CHLORIDE: 600; 310; 30; 20 INJECTION, SOLUTION INTRAVENOUS at 13:04

## 2020-09-24 RX ADMIN — PHENYLEPHRINE HYDROCHLORIDE 100 MCG: 10 INJECTION INTRAVENOUS at 13:48

## 2020-09-24 NOTE — OP NOTES
Gastroenterology Procedure Note           Procedure:  Colonoscopy    Date of Procedure:  9/24/2020    Patient:  Sarah Bruner     1965    Indication:  Hematochezia     Sedation:  MAC    Pre-Procedure Exam:    Mental status:  alert and oriented  Airway:  normal oropharyngeal airway and neck mobility  CV:  regular rate and rhythm   Respiratory:  clear to auscultation    Procedure:  A History and Physical has been performed, and patient medication allergies have been reviewed. Risks of perforation, hemorrhage, adverse drug reaction, and aspiration were discussed. Informed consent was obtained for the procedure, including sedation. The patient was placed in the left lateral decubitus position. The heart rate, oxygen saturations, blood pressure, and response to care were monitored throughout the procedure. After performing a rectal exam, the colonoscope was passed through the anus and advanced under direct vision to the cecum, identified by appendiceal orifice and ileocecal valve. The quality of prep was adequate. A careful inspection was made as the colonoscope was withdrawn, including a retroflexed view of the rectum. The patient tolerated the procedure well. Findings:     ANUS:  Anal exam reveals no masses or hemorrhoids. RECTUM:  Rectal exam reveals no masses or hemorrhoids. COLON:  Extensive diverticulosis was seen throughout the entire colon. A moderate amount of old blood is seen. Vigorous irrigation with aspiration of liquid was performed to optimize visualization. After 40 minutes of repeated careful inspection, no active bleeding or culprit lesion was identified. TERMINAL ILEUM:  The terminal ileum was normal.    Specimen:  No    Estimated Blood Loss:  Minimal    Implant:  None           Impression:    Pandiverticulosis. No active bleeding or culprit lesion was identified for endoscopic therapy. Plan:  1. Resume diet.    2. If rebleeding occurs, patient will likely require IR embolization or surgery. 3. We will sign off, but do not hesitate to contact us for any additional assistance. We will arrange for a GI follow-up office visit in 3-4 weeks. Patient will be contacted by our office.      Signed:  Pretty Barron MD  9/24/2020  9:42 AM

## 2020-09-24 NOTE — PROGRESS NOTES
TRANSFER - IN REPORT:    Verbal report received from SARIAH GARCIA RN(name) on Mayelin Body  being received from 6th floor(unit) for ordered procedure      Report consisted of patients Situation, Background, Assessment and   Recommendations(SBAR). Information from the following report(s) SBAR, Kardex, Intake/Output, MAR and Recent Results was reviewed with the receiving nurse. Opportunity for questions and clarification was provided. Assessment completed upon patients arrival to unit and care assumed.            Patient to be transported to the GI lab for colonoscopy scheduled for 1300

## 2020-09-24 NOTE — PROGRESS NOTES
TRANSFER - OUT REPORT:    Verbal report given to LAUREN Stock(name) on Javier Been  being transferred to ENDO(unit) for ordered procedure       Report consisted of patients Situation, Background, Assessment and   Recommendations(SBAR). Information from the following report(s) SBAR was reviewed with the receiving nurse. Lines:   Peripheral IV 09/22/20 Right Forearm (Active)   Site Assessment Clean, dry, & intact 09/24/20 0715   Phlebitis Assessment 0 09/24/20 0715   Infiltration Assessment 0 09/24/20 0715   Dressing Status Clean, dry, & intact 09/24/20 0715   Dressing Type Tape;Transparent 09/24/20 0715   Hub Color/Line Status Flushed 09/23/20 9968        Opportunity for questions and clarification was provided.       Patient transported with:   [employer]

## 2020-09-24 NOTE — PROGRESS NOTES
Tap water enema administered with Pt on left side. 1500 mL luke warm water. Pt tolerated procedure well. Towels and washcloths provided. Primary RN Srinivasan Walker updated.

## 2020-09-24 NOTE — ROUTINE PROCESS
TRANSFER - OUT REPORT: 
 
Verbal report given to Tanja Gill RN(name) on Tobin Stanford  being transferred to [facility](unit) for routine post - op Report consisted of patients Situation, Background, Assessment and  
Recommendations(SBAR). Information from the following report(s) SBAR was reviewed with the receiving nurse. Lines:  
Peripheral IV 09/22/20 Right Forearm (Active) Site Assessment Clean, dry, & intact 09/24/20 1240 Phlebitis Assessment 0 09/24/20 1240 Infiltration Assessment 0 09/24/20 1240 Dressing Status Clean, dry, & intact 09/24/20 1240 Dressing Type Tape;Transparent 09/24/20 1240 Hub Color/Line Status Pink; Infusing;Flushed 09/24/20 1240 Opportunity for questions and clarification was provided. Patient transported with: 
 [employer]

## 2020-09-24 NOTE — PROGRESS NOTES
TRANSFER - IN REPORT:    Verbal report received from LAUREN Zayas(name) on [employer]  being received from ENDO(unit) for routine progression of care      Report consisted of patients Situation, Background, Assessment and   Recommendations(SBAR). Information from the following report(s) SBAR was reviewed with the receiving nurse. Opportunity for questions and clarification was provided. Assessment completed upon patients arrival to unit and care assumed.

## 2020-09-24 NOTE — PROGRESS NOTES
Chart screened by  for discharge planning. No needs identified at this time. Patient was scheduled for colonoscopy this day. Please consult or notify  if any new issues arise. CM continues to monitor.

## 2020-09-24 NOTE — PROGRESS NOTES
Pt npo since midnight. Hourly and PRN rounds performed during this shift; all needs met at this time. Bed in low/locked position and call light, personal items within reach.

## 2020-09-24 NOTE — ANESTHESIA PREPROCEDURE EVALUATION
Relevant Problems   No relevant active problems       Anesthetic History   No history of anesthetic complications            Review of Systems / Medical History  Patient summary reviewed and pertinent labs reviewed    Pulmonary  Within defined limits                 Neuro/Psych         Psychiatric history (Anxiety)     Cardiovascular    Hypertension: well controlled              Exercise tolerance: >4 METS  Comments: H/o cardiomyopathy from ETOH, most recent echo with EF 50%   GI/Hepatic/Renal     GERD: well controlled      Hiatal hernia     Endo/Other        Anemia (requiring blood transfusion)     Other Findings   Comments: ETOH abuse in past  Current lower GI bleed         Physical Exam    Airway  Mallampati: II  TM Distance: 4 - 6 cm  Neck ROM: normal range of motion   Mouth opening: Normal     Cardiovascular    Rhythm: regular  Rate: normal         Dental    Dentition: Caps/crowns     Pulmonary  Breath sounds clear to auscultation               Abdominal  GI exam deferred       Other Findings            Anesthetic Plan    ASA: 3  Anesthesia type: total IV anesthesia          Induction: Intravenous  Anesthetic plan and risks discussed with: Patient

## 2020-09-24 NOTE — H&P
History and Physical for Procedures             Date: 2020     History of Present Illness:  Patient presents to undergo EGD and colonoscopy. Past Medical History:   Diagnosis Date    Alcoholism /alcohol abuse ([facility] [place] [address]     Anxiety     Cardiomyopathy ([facility] [place] [address] 2016    Diverticular disease     colon resection with no symptoms since surgery    Essential hypertension     managed with med.  GERD (gastroesophageal reflux disease)     takes medication but not altogether relieved    Hiatal hernia with gastroesophageal reflux 2013    Hypertension     managed with med.       Past Surgical History:   Procedure Laterality Date    COLONOSCOPY N/A 2020    COLONOSCOPY ROOM 517 performed by [facility] at [facility] END[facility]COPY    HX COLECTOMY  2008    HX COLONOSCOPY  3/2018,     10 years    HX CYST REMOVAL  2017    renal cyst    HX HERNIA REPAIR      Juan Manuel fundiplication      Family History   Problem Relation Age of Onset    Cancer Father         brain    Breast Cancer Mother     No Known Problems Brother      Social History     Tobacco Use    Smoking status: Former Smoker     Years: 20.00     Last attempt to quit: 2015     Years since quittin.8    Smokeless tobacco: Never Used   Substance Use Topics    Alcohol use: No     Alcohol/week: 0.0 standard drinks     Comment: last drink 3/2015 sober        Allergies   Allergen Reactions    Pcn [Penicillins] Hives     Current Facility-Administered Medications   Medication Dose Route Frequency    0.9% sodium chloride infusion 250 mL  250 mL IntraVENous PRN    cyanocobalamin tablet 1,000 mcg  1,000 mcg Oral DAILY    multivitamin, stress formula (STRESS TAB) tablet 1 Tab  1 Tab Oral DAILY    sodium chloride (NS) flush 5-40 mL  5-40 mL IntraVENous Q8H    sodium chloride (NS) flush 5-40 mL  5-40 mL IntraVENous PRN    ondansetron (ZOFRAN) injection 4 mg  4 mg IntraVENous Q4H PRN    pantoprazole (PROTONIX) 40 mg in 0.9% sodium chloride 10 mL injection  40 mg IntraVENous Q12H    lactated Ringers infusion  50 mL/hr IntraVENous CONTINUOUS        Review of Systems:  A detailed 10 organ review of systems is obtained with pertinent positives as listed in the History of Present Illness. All others are negative. Objective:     Physical Exam:  Visit Vitals  /75 (BP 1 Location: Right arm, BP Patient Position: At rest)   Pulse 92   Temp 97.8 °F (36.6 °C)   Resp 18   Ht 5' 8\" (1.727 m)   Wt 86.2 kg (190 lb)   SpO2 97%   BMI 28.89 kg/m²      General:  Alert and oriented. Heart: Regular rate and rhythm  Lungs:  Clear to auscultation bilaterally  Abdomen: Soft, nontender, nondistended    Impression/Plan:     Proceed with EGD and colonoscopy as planned. I have discussed with the patient the technique, benefits, alternatives, and risks of these procedures, including medication reaction, immediate or delayed bleeding, or perforation of the gastrointestinal tract.      Signed By: Paco Chilel MD     September 24, 2020

## 2020-09-24 NOTE — OP NOTES
Gastroenterology Procedure Note           Procedure:  Esophagogastroduodenoscopy    Date of Procedure:  9/24/2020    Patient:  Estevan Moulton     1965    Indication:  Hematochezia     Sedation:  MAC    Pre-Procedure Physical Exam:    Mental status:  alert and oriented  Airway:  normal oropharyngeal airway and neck mobility  CV:  regular rate and rhythm  Respiratory:  clear to auscultation    Procedure:  A History and Physical has been performed, and patient medication allergies have been reviewed. Risks of perforation, hemorrhage, adverse drug reaction, and aspiration were discussed. Informed consent was obtained for the procedure, including sedation. The patient was placed in the left lateral decubitus position. The heart rate, oxygen saturations, blood pressure, and response to care were monitored throughout the procedure. The gastroscope was passed through the mouth and advanced under direct vision to the second portion of the duodenum. A careful inspection was made as the gastroscope was withdrawn, including a retroflexed view of the proximal stomach. The patient tolerated the procedure well. Findings:     OROPHARYNX: Cords and pyriform recesses appear normal.   ESOPHAGUS: The distal esophagus was tortuous. The Z-Line is intact. STOMACH: On retroflexion, the flap-valve is classified as Hill Grade III, with a tissue fold present at the lesser curvature that is not prominent and an open hiatus. A sliding-type hiatal hernia is seen with axial height of 2 cm and approximate transverse width of 2 cm. The fundus on antegrade and retroflexed views is normal. The body, antrum, and pylorus are normal.   DUODENUM: The bulb and second portions are normal.    Specimen:  No    Estimated Blood Loss:  None    Implant:  None           Impression:    Tortuous distal esophagus. Hiatal hernia. No source of GI bleeding is seen. Plan:  Proceed with colonoscopy as planned for today.      Signed:  Romulo Baez Nicholas Rao MD  9/24/2020  9:41 AM

## 2020-09-24 NOTE — PROGRESS NOTES
Hourly rounds performed. Pt has not had any further bleeding since returning from endo. Will continue to monitor.

## 2020-09-24 NOTE — ANESTHESIA POSTPROCEDURE EVALUATION
Procedure(s):  ESOPHAGOGASTRODUODENOSCOPY (EGD)  COLONOSCOPY /29/ Room 630. total IV anesthesia    Anesthesia Post Evaluation      Multimodal analgesia: multimodal analgesia not used between 6 hours prior to anesthesia start to PACU discharge  Patient location during evaluation: bedside  Patient participation: complete - patient participated  Level of consciousness: awake  Pain score: 1  Pain management: adequate  Airway patency: patent  Anesthetic complications: no  Cardiovascular status: acceptable  Respiratory status: acceptable  Hydration status: acceptable  Comments: Pt doing well. Post anesthesia nausea and vomiting:  none  Final Post Anesthesia Temperature Assessment:  Normothermia (36.0-37.5 degrees C)      INITIAL Post-op Vital signs:   Vitals Value Taken Time   BP 92/55 9/24/2020  2:01 PM   Temp 36 °C (96.8 °F) 9/24/2020  1:59 PM   Pulse 70 9/24/2020  2:05 PM   Resp 15 9/24/2020  1:59 PM   SpO2 97 % 9/24/2020  2:05 PM   Vitals shown include unvalidated device data.

## 2020-09-24 NOTE — PROGRESS NOTES
Progress Note    Patient: Adonay Russ MRN: [id number]  SSN: [id number]    YOB: 1965  Age: 54 y.o. Sex: male      Admit Date: 9/21/2020    LOS: 3 days     Subjective:   F/U GI bleeding. \"53 yo hx of GI bleeding, diverticulitis (s/p partial colon resection), and GERD (s/p Nissen fundoplication) presents to the ER with chief complaint of bloody stools. Patient reports he woke up earlier during the night and felt like he had to go to the bathroom. He reports when he got to the bathroom he passed a large amount of stool with a large amount of bright red blood. \"    Does admit to taking more Aleve lately due to back pain. GI consulted. Recommended high fiber diet and to avoid NSAIDs. Recommended to consider NM GI bleeding scan if large volume hematochezia seen. On 9/22 noted to have worsening bleeding so NM bleeding scan ordered which showed bleeding at the RLQ. IR consulted who attempted intervention but no active bleeding seen while in IR procedure room. Received 1 unit PRBC on 9/23. Hg fluctuating. Still with bloody BMs. No chest pain or SOB. States he is feeling better compared to yesterday.    Current Facility-Administered Medications   Medication Dose Route Frequency    0.9% sodium chloride infusion 250 mL  250 mL IntraVENous PRN    cyanocobalamin tablet 1,000 mcg  1,000 mcg Oral DAILY    multivitamin, stress formula (STRESS TAB) tablet 1 Tab  1 Tab Oral DAILY    sodium chloride (NS) flush 5-40 mL  5-40 mL IntraVENous Q8H    sodium chloride (NS) flush 5-40 mL  5-40 mL IntraVENous PRN    ondansetron (ZOFRAN) injection 4 mg  4 mg IntraVENous Q4H PRN    pantoprazole (PROTONIX) 40 mg in 0.9% sodium chloride 10 mL injection  40 mg IntraVENous Q12H    lactated Ringers infusion  50 mL/hr IntraVENous CONTINUOUS       Objective:     Vitals:    09/23/20 1951 09/24/20 0031 09/24/20 0438 09/24/20 0707   BP: (!) 148/67 110/72 110/73 115/75   Pulse: 81 83 76 92   Resp: 18 18 20 18 Temp: 98.4 °F (36.9 °C) 98.1 °F (36.7 °C) 98.7 °F (37.1 °C) 97.8 °F (36.6 °C)   SpO2: 98% 97% 95% 97%   Weight:       Height:             Intake and Output:  Current Shift: No intake/output data recorded. Last three shifts: 09/22 1901 - 09/24 0700  In: 920 [P.O.:590]  Out: -     Physical Exam:   General:  Alert, cooperative, no distress, good color to face. Eyes:  Conjunctivae/corneas clear. Ears:  Normal TMs and external ear canals both ears. Nose: Nares normal. Septum midline. Mouth/Throat: Lips, mucosa, and tongue normal.    Neck:  no JVD. Back:   deferred   Lungs:   Clear to auscultation bilaterally. Heart:  Regular rate and rhythm, S1, S2 normal   Abdomen:   Soft, non-tender. Bowel sounds normal.    Extremities: Extremities normal, atraumatic, no cyanosis or edema. Pulses: 2+ and symmetric all extremities. Skin: Skin color, texture, turgor normal. No rashes or lesions   Lymph nodes: Cervical, supraclavicular, and axillary nodes normal.   Neurologic: CNII-XII intact.         Lab/Data Review:    Recent Results (from the past 24 hour(s))   CREATININE, POC    Collection Time: 09/23/20 10:27 AM   Result Value Ref Range    Creatinine (POC) 0.6 (L) 0.8 - 1.5 mg/dL    GFRAA, POC >60 >60 ml/min/1.73m2    GFRNA, POC >60 >60 ml/min/1.73m2   HGB & HCT    Collection Time: 09/23/20 12:00 PM   Result Value Ref Range    HGB 8.3 (L) 13.6 - 17.2 g/dL    HCT 25.2 (L) 41.1 - 50.3 %   HGB & HCT    Collection Time: 09/23/20  6:02 PM   Result Value Ref Range    HGB 8.8 (L) 13.6 - 17.2 g/dL    HCT 26.8 (L) 41.1 - 50.3 %   HGB & HCT    Collection Time: 09/24/20  1:56 AM   Result Value Ref Range    HGB 8.0 (L) 13.6 - 17.2 g/dL    HCT 24.0 (L) 41.1 - 63.8 %   METABOLIC PANEL, BASIC    Collection Time: 09/24/20  1:56 AM   Result Value Ref Range    Sodium 145 136 - 145 mmol/L    Potassium 3.4 (L) 3.5 - 5.1 mmol/L    Chloride 113 (H) 98 - 107 mmol/L    CO2 26 21 - 32 mmol/L    Anion gap 6 (L) 7 - 16 mmol/L    Glucose 102 (H) 65 - 100 mg/dL    BUN 8 6 - 23 MG/DL    Creatinine 0.62 (L) 0.8 - 1.5 MG/DL    GFR est AA >60 >60 ml/min/1.73m2    GFR est non-AA >60 >60 ml/min/1.73m2    Calcium 7.7 (L) 8.3 - 10.4 MG/DL       Assessment/ Plan: Active Problems:    Gastroesophageal reflux disease (12/19/2018)      GI bleed (2/15/2020)    Gi bleeding - PPI BID. GI following. IR could not perform embolization since no active bleeding. Trend H&H.  Plan for colonoscopy today    Possible dc in 2 days    DVT prophylaxis - SCDs  Signed By: Blake Juarez DO     September 24, 2020

## 2020-09-25 VITALS
DIASTOLIC BLOOD PRESSURE: 80 MMHG | BODY MASS INDEX: 28.79 KG/M2 | HEART RATE: 80 BPM | WEIGHT: 190 LBS | SYSTOLIC BLOOD PRESSURE: 105 MMHG | HEIGHT: 68 IN | RESPIRATION RATE: 18 BRPM | OXYGEN SATURATION: 96 % | TEMPERATURE: 98.3 F

## 2020-09-25 LAB
HCT VFR BLD AUTO: 23.8 % (ref 41.1–50.3)
HGB BLD-MCNC: 7.8 G/DL (ref 13.6–17.2)

## 2020-09-25 PROCEDURE — 85018 HEMOGLOBIN: CPT

## 2020-09-25 PROCEDURE — C9113 INJ PANTOPRAZOLE SODIUM, VIA: HCPCS | Performed by: INTERNAL MEDICINE

## 2020-09-25 PROCEDURE — 74011250637 HC RX REV CODE- 250/637: Performed by: INTERNAL MEDICINE

## 2020-09-25 PROCEDURE — 74011250636 HC RX REV CODE- 250/636: Performed by: INTERNAL MEDICINE

## 2020-09-25 PROCEDURE — 74011000250 HC RX REV CODE- 250: Performed by: INTERNAL MEDICINE

## 2020-09-25 PROCEDURE — 36415 COLL VENOUS BLD VENIPUNCTURE: CPT

## 2020-09-25 RX ADMIN — Medication 10 ML: at 05:26

## 2020-09-25 RX ADMIN — PANTOPRAZOLE SODIUM 40 MG: 40 INJECTION, POWDER, FOR SOLUTION INTRAVENOUS at 09:04

## 2020-09-25 RX ADMIN — CYANOCOBALAMIN TAB 1000 MCG 1000 MCG: 1000 TAB at 09:04

## 2020-09-25 NOTE — PROGRESS NOTES
Pt rounded on hourly and PRN. No complaints of pain, nausea,vomiting. Pt reported no BM's this shift. Bed is low, locked, call bell within reach. All needs met this shift. Will continue to monitor until next RN comes on.

## 2020-09-25 NOTE — PROGRESS NOTES
Discharge instructions provided to Pt including GI diet. Pt verbalized understanding. Pt requested work excuse through Oct 1. Dr Hiral Pearson ok with this time. PIV x 1 removed. Primary RN aware.

## 2020-09-25 NOTE — DISCHARGE SUMMARY
Hospitalist Discharge Summary     Patient ID:  Fredo Miller  [id number]  54 y.o.  1965  Admit date: 9/21/2020  3:54 AM  Discharge date and time: 9/25/2020  Attending: Zev Guthrie DO  PCP:  Sandro Nielson MD  Treatment Team: Attending Provider: Zev Guthrie DO; Consulting Provider: Gabriela Amaro MD; Care Manager: Cecelia Pollock; Utilization Review: Dusty Earl; Utilization Review: Mcarthur Son; Primary Nurse: Stephanie Ayala    Principal Diagnosis <principal problem not specified>   Active Problems:    Gastroesophageal reflux disease (12/19/2018)      GI bleed (2/15/2020)     Hospital Course: \"55 yo hx of GI bleeding, diverticulitis (s/p partial colon resection), and GERD (s/p Nissen fundoplication) presents to the ER with chief complaint of bloody stools. Patient reports he woke up earlier during the night and felt like he had to go to the bathroom.  He reports when he got to the bathroom he passed a large amount of stool with a large amount of bright red blood. \"     Does admit to taking more Aleve lately due to back pain. GI consulted. Recommended high fiber diet and to avoid NSAIDs. Recommended to consider NM GI bleeding scan if large volume hematochezia seen. On 9/22 noted to have worsening bleeding so NM bleeding scan ordered which showed bleeding at the RLQ. IR consulted who attempted intervention but no active bleeding seen while in IR procedure room. Received 1 unit PRBC on 9/23. Hg fluctuating. Had colonoscopy on 9/24 that showed pandiverticulosis with no active bleeding. Since procedure, no further obvious signs of bleeding. Patient feeling well and feels safe to go home. Repeat H&H in three days. Tolerating diet. H&H on day of discharge 7.8. If obvious bleeding, weakness, or AMS, please come back to the ED    Please refer to the admission H&P for details of presentation. In summary, the patient is stable for discharge.      Significant Diagnostic Studies: Labs: Results:       Chemistry Recent Labs     09/24/20  0156   *      K 3.4*   *   CO2 26   BUN 8   CREA 0.62*   CA 7.7*   AGAP 6*      CBC w/Diff Recent Labs     09/25/20  0204 09/24/20  1606 09/24/20  0156   HGB 7.8* 8.2* 8.0*   HCT 23.8* 25.0* 24.0*      Cardiac Enzymes No results for input(s): CPK, CKND1, JUNIOR in the last 72 hours. No lab exists for component: CKRMB, TROIP   Coagulation No results for input(s): PTP, INR, APTT, INREXT in the last 72 hours. Lipid Panel Lab Results   Component Value Date/Time    Cholesterol, total 193 10/04/2019 08:22 AM    HDL Cholesterol 52 10/04/2019 08:22 AM    LDL, calculated 127 (H) 10/04/2019 08:22 AM    VLDL, calculated 14 10/04/2019 08:22 AM    Triglyceride 72 10/04/2019 08:22 AM      BNP No results for input(s): BNPP in the last 72 hours. Liver Enzymes No results for input(s): TP, ALB, TBIL, AP in the last 72 hours. No lab exists for component: SGOT, GPT, DBIL   Thyroid Studies Lab Results   Component Value Date/Time    T4, Total 8.5 02/22/2012 04:19 PM    TSH 1.420 10/04/2019 08:22 AM            Discharge Exam:  Visit Vitals  /80 (BP 1 Location: Right arm, BP Patient Position: At rest)   Pulse 80   Temp 98.3 °F (36.8 °C)   Resp 18   Ht 5' 8\" (1.727 m)   Wt 86.2 kg (190 lb)   SpO2 96%   BMI 28.89 kg/m²     General appearance: alert, cooperative, no distress, appears stated age. Good color to face. Lungs: clear to auscultation bilaterally  Heart: regular rate and rhythm, S1, S2 normal  Abdomen: soft, non-tender. Bowel sounds normal.   Extremities: no cyanosis or edema  Neurologic: Grossly normal    Disposition:Home   Discharge Condition: stable  Patient Instructions: As above   Current Discharge Medication List      CONTINUE these medications which have NOT CHANGED    Details   B.infantis-B.ani-B.long-B.bifi (Probiotic 4X) 10-15 mg TbEC Take 1 Tab by mouth daily. cyanocobalamin 1,000 mcg tablet Take 1,000 mcg by mouth daily. ascorbic acid (VITAMIN C) 1,000 mg tablet Take 1,000 mg by mouth every morning.        multivitamin (ONE A DAY) tablet Take 1 Tab by mouth every morning. Activity:Up and zayda  Diet:GI soft, titrate as can tolerate. Wound Care:None     Follow-up PCP in one week. GI in three weeks.    ·     Time spent to discharge patient 35 minutes  Signed:  Britni Guevara DO  9/25/2020  7:39 AM

## 2020-09-25 NOTE — DISCHARGE INSTRUCTIONS
Learning About Fundoplication Surgery  What is fundoplication surgery? Fundoplication surgery is done to treat gastroesophageal reflux disease (GERD). In this surgery, the doctor strengthens the valve between the stomach and the esophagus. The esophagus is the tube that connects the mouth to the stomach. A strong valve prevents stomach acid from moving back into the esophagus. The doctor will wrap the upper part of the stomach (fundus) around the lower part of the esophagus. After surgery, you should have fewer symptoms of GERD, such as heartburn. How is it done? In this surgery, the doctor wraps the upper part of the stomach (fundus) around the lower part of the esophagus. It's most often done as laparoscopic surgery. The doctor puts a lighted tube, or scope, and other surgical tools through small incisions (cuts) in your belly. The doctor is able to see your organs with the scope. Most people stay in the hospital 2 to 3 days. Your doctor may do an open surgery. The doctor makes a larger cut in the middle of your belly. You will probably stay in the hospital for 4 or 5 days after open surgery. What can you expect after this surgery? You may be sore and have some pain in your belly for several weeks. If you had laparoscopic surgery, you also may have pain near your shoulder for a day or two. It may be hard for you to swallow for up to 6 weeks. You may also have cramping in your belly, feel bloated, or pass more gas than before. The cramping and bloating usually go away in 2 to 3 months. But you may keep passing more gas for a long time. When you burp, you may not get as much relief as you did before the surgery. Or you may not be able to burp after surgery. The surgery makes your stomach a little smaller, so you may get full more quickly when you eat. In 2 to 3 months, the stomach adjusts. You will be able to eat your usual amounts of food.   How quickly you recover depends on what type of surgery you had. After laparoscopic surgery, most people can go back to work or their normal routine in about 2 to 3 weeks. It depends on their work. After open surgery, you may need 4 to 6 weeks to get back to your normal routine. The incisions from both types of surgeries leave scars that fade over time. Follow-up care is a key part of your treatment and safety. Be sure to make and go to all appointments, and call your doctor if you are having problems. It's also a good idea to know your test results and keep a list of the medicines you take. Where can you learn more? Go to http://ezequiel-geovany.info/  Enter R113 in the search box to learn more about \"Learning About Fundoplication Surgery. \"  Current as of: April 15, 2020               Content Version: 12.6  © 9124-3830 [employer]. Care instructions adapted under license by [employer] (which disclaims liability or warranty for this information). If you have questions about a medical condition or this instruction, always ask your healthcare professional. [place] [address] any warranty or liability for your use of this information. Patient Education        Gastrointestinal Bleeding: Care Instructions  Your Care Instructions     The digestive or gastrointestinal tract goes from the mouth to the anus. It is often called the GI tract. Bleeding can happen anywhere in the GI tract. It may be caused by an ulcer, an infection, or cancer. It may also be caused by medicines such as aspirin or ibuprofen. Light bleeding may not cause any symptoms at first. But if you continue to bleed for a while, you may feel very weak or tired. Sudden, heavy bleeding means you need to see a doctor right away. This kind of bleeding can be very dangerous. But it can usually be cured or controlled. The doctor may do some tests to find the cause of your bleeding. Follow-up care is a key part of your treatment and safety.  Be sure to make and go to all appointments, and call your doctor if you are having problems. It's also a good idea to know your test results and keep a list of the medicines you take. How can you care for yourself at home? · Be safe with medicines. Take your medicines exactly as prescribed. Call your doctor if you think you are having a problem with your medicine. You will get more details on the specific medicines your doctor prescribes. · Do not take aspirin or other anti-inflammatory medicines, such as naproxen (Aleve) or ibuprofen (Advil, Motrin), without talking to your doctor first. Ask your doctor if it is okay to use acetaminophen (Tylenol). · Do not drink alcohol. · The bleeding may make you lose iron. So it's important to eat foods that have a lot of iron. These include red meat, shellfish, poultry, and eggs. They also include beans, raisins, whole-grain breads, and leafy green vegetables. If you want help planning meals, you can make an appointment with a dietitian. When should you call for help? Call 911 anytime you think you may need emergency care. For example, call if:    · You have sudden, severe belly pain.     · You vomit blood or what looks like coffee grounds.     · You passed out (lost consciousness).     · Your stools are maroon or very bloody. Call your doctor now or seek immediate medical care if:    · You are dizzy or lightheaded, or you feel like you may faint.     · Your stools are black and look like tar, or they have streaks of blood.     · You have belly pain.     · You vomit or have nausea.     · You have trouble swallowing, or it hurts when you swallow. Watch closely for changes in your health, and be sure to contact your doctor if:    · You do not get better as expected. Where can you learn more? Go to http://www.gray.com/  Enter F981 in the search box to learn more about \"Gastrointestinal Bleeding: Care Instructions. \"  Current as of: June 26, 7502               JJJQABU Version: 12.6  © 2987-8910 [employer]. Care instructions adapted under license by [employer] (which disclaims liability or warranty for this information). If you have questions about a medical condition or this instruction, always ask your healthcare professional. [address] any warranty or liability for your use of this information.

## 2020-09-25 NOTE — PROGRESS NOTES
[address] September 25, 2020       RE: Slim Aguayo      To Whom It May Concern,    This is to certify that Slim Aguayo may may return to work on Friday, October 2nd. Mr. Sandrita Bro was at [facility] from 9/21/2020-9/25/2020. Please feel free to contact my office at [phone] if you have any questions or concerns. Thank you for your assistance in this matter.       Sincerely,  Dot Arora MD

## 2020-09-28 ENCOUNTER — APPOINTMENT (OUTPATIENT)
Dept: PHYSICAL THERAPY | Age: 55
End: 2020-09-28
Payer: COMMERCIAL

## 2020-09-30 ENCOUNTER — APPOINTMENT (OUTPATIENT)
Dept: PHYSICAL THERAPY | Age: 55
End: 2020-09-30
Payer: COMMERCIAL

## 2020-10-05 ENCOUNTER — APPOINTMENT (OUTPATIENT)
Dept: PHYSICAL THERAPY | Age: 55
End: 2020-10-05

## 2020-10-07 ENCOUNTER — APPOINTMENT (OUTPATIENT)
Dept: PHYSICAL THERAPY | Age: 55
End: 2020-10-07

## 2020-12-11 NOTE — THERAPY DISCHARGE
Davon Chaves  : 1965      Payor: [employer] / Plan: Justo Hernández / Product Type: Commerical /    Bill Gomez at [address] [facility] [address], [facility], [address] [place] [address]  [phone]   [phone]              OUTPATIENT PHYSICAL THERAPY:Discharge: 2020    ICD-10: Treatment Diagnosis:   Pain in Right Shoulder (M25.511)  Impingement syndrome of Right Shoulder (M75.41)              Precautions/Allergies:   Pcn [penicillins]   Fall Risk Score: 1 (? 5 = High Risk)  MD Orders: Eval and Treat  MEDICAL/REFERRING DIAGNOSIS:  Acute pain of right shoulder   DATE OF ONSET: 3 weeks ago  REFERRING PHYSICIAN: Linda Fonseca NP  RETURN PHYSICIAN APPOINTMENT: TBD by patient      FINAL ASSESSMENT:   Mr. Mercedes Rothman self discharged due to family illness. Unable to perform final assessment. PROBLEM LIST (Impacting functional limitations):  · Decreased Strength  · Decreased ADL/Functional Activities  · Increased Pain  · Decreased Activity Tolerance  · Increased Shortness of Breath  · Decreased Flexibility/Joint Mobility  · Decreased Schleicher with Home Exercise Program INTERVENTIONS PLANNED:  1. Cold  2. Family Education  3. Home Exercise Program (HEP)  4. Manual Therapy  5. Neuromuscular Re-education/Strengthening  6. Range of Motion (ROM)  7. Therapeutic Activites  8. Therapeutic Exercise/Strengthening  9. Transfer Training  10. Ultrasound   TREATMENT PLAN:    GOALS: (Goals have been discussed and agreed upon with patient.)     Short-Term Goals~4 weeks  Goal Met   1. Davon Chaves will report <=2/10 pain with working on his computer during the day at work 1. [] Date:   2. Davon Chaves will demonstrate improvement in active shoulder R IR to >T9  to increase UE function and participation in ADLs. 2.  [] Date:   3.  Davon Chaves will demonstrate demonstrate improvement in active shoulder R flexion to >165 degrees to increase UE function and participation in ADLs. 3.  [] Date:   4. Tobin Stanford will show a greater than 8 point decrease on the DASH in order to show an increase in upper extremity function. 4.  [] Date:   5. Tobin Stanford will be independent in all HEP 5.  [] Date:   6.  6.  [] Date:         Long Term Goals~8 weeks Goal Met   1. Tobin Stanford will show full ROM of the UE in order to return to full functional mobility  1. [] Date:   2. Tobin Stanford will show a greater than 15 point decrease on the DASH in order to show an increase in upper extremity function 2. [] Date:   3. Tobin Stanford will report doing hair/bathing without difficulty and <=2/10 pain in order to be independent with ADL's 3.  [] Date:   4. Tobin Stanford will be independent in all advanced HEP 4.  [] Date:            Outcome Measure: Tool Used: Disabilities of the Arm, Shoulder and Hand (DASH) Questionnaire - Quick Version  Score:  Initial: 19/55  Most Recent: X/55 (Date: -- )   Interpretation of Score: The DASH is designed to measure the activities of daily living in person's with upper extremity dysfunction or pain. Each section is scored on a 1-5 scale, 5 representing the greatest disability. The scores of each section are added together for a total score of 55. Medical Necessity:   · Skilled intervention continues to be required due to deficits and impairments seen upon initial evaluation affecting patient's participation in ADLs and functional tasks. Reason for Services/Other Comments:  · Patient continues to require skilled intervention due to deficits and impairments seen upon initial evaluation affecting patient's participation in ADLs and functional tasks.     Total Treatment Duration: 40 mins  PT Patient Time In/Time Out  Time In: 1600  Time Out: 1645    Rehabilitation Potential For Stated Goals: good  Regarding Abhilash Lynchd's therapy, I certify that the treatment plan above will be carried out by a therapist or under their direction. Thank you for this referral,  Tori Nevarez,      Referring Physician Signature: Chloe Prajapati NP              Date                    HISTORY:   History of Present Injury/Illness (Reason for Referral):  Tobin Stanford is a 54 y.o male referred to PT by his PCP due to acute onset of R shoulder pain about 3 weeks ago. No VAN that he can recall but relates it to working on his computer mouse. The pain goes up to his neck but no referral patterns down the arm (N/T). -Present symptoms/complaints (on day of evaluation)  Pain Scale:  · Current: 4/10  · Best: 3/10  · Worst: 7/10    · Aggravating factors: sleeping and using his computer mouse and reaching out away from body  · Relieving factors: massaging it himself, resting arm by side and ibuprofen  · Irritability: Low (Onset of pain is is longer than the time it takes for Pain to go away)    Dominant Side:  right  Past Medical History/Comorbidities:   Mr. Valere Opitz  has a past medical history of Alcoholism /alcohol abuse ([facility] Anxiety, Cardiomyopathy ([facility] (6/30/2016), Diverticular disease, Essential hypertension, GERD (gastroesophageal reflux disease), Hiatal hernia with gastroesophageal reflux (2/5/2013), and Hypertension. He also has no past medical history of Dementia, Difficult intubation, Endocrine disease, Infectious disease, Malignant hyperthermia due to anesthesia, Nausea & vomiting, Neurological disorder, Pseudocholinesterase deficiency, Sleep disorder, or Unspecified adverse effect of anesthesia. Mr. Valere Opitz  has a past surgical history that includes hx cyst removal (2017); hx colectomy (4/2008); hx hernia repair; colonoscopy (N/A, 2/24/2020); colonoscopy (N/A, 9/24/2020); hx colonoscopy (3/2018, 2008); and hx colonoscopy (2020).   Social History/Living Environment:    - a lot of computer work   Prior Level of Function/Work/Activity:  Unrestricted  Other Clinical Tests:         none  Current Medications:    Current Outpatient Medications:     cyclobenzaprine (FLEXERIL) 10 mg tablet, , Disp: , Rfl:     B.infantis-B.ani-B.long-B.bifi (Probiotic 4X) 10-15 mg TbEC, Take 1 Tab by mouth daily. , Disp: , Rfl:     cyanocobalamin 1,000 mcg tablet, Take 1,000 mcg by mouth daily. , Disp: , Rfl:     ascorbic acid (VITAMIN C) 1,000 mg tablet, Take 1,000 mg by mouth every morning.  , Disp: , Rfl:     multivitamin (ONE A DAY) tablet, Take 1 Tab by mouth every morning.  , Disp: , Rfl:         Ambulatory/Rehab Services H2 Model Falls Risk Assessment    Risk Factors:       (1)  Gender [Male] Ability to Rise from Chair:       (0)  Ability to rise in a single movement    Falls Prevention Plan:       No modifications necessary   Total: (5 or greater = High Risk): 1    ©2010 [facility] of [employer]. All Rights Reserved. [facility] Patent #[id number]. Federal Law prohibits the replication, distribution or use without written permission from [facility] [employer]       Date Last Reviewed:  12/11/2020   EXAMINATION:   Observation/Orthostatic Postural Assessment:     Forward Head and Rounded Shoulders  Palpation:          TTP posterior scapular mms (rhomboids) with trp present  ROM:    AROM/PROM         Joint: Eval Date: 12/11/2020  Re-Assess Date:  Re-Assess Date:    ACTIVE ROM (standing) RIGHT LEFT RIGHT LEFT RIGHT LEFT   Shoulder Flexion 156            Shoulder Abduction             Shoulder Internal Rotation (Apley's) T7            Shoulder External Rotation (Apley's) T4            Elbow ROM             PASSIVE ROM (supine)             Shoulder Flexion             Shoulder Abduction   [address]  [address]        Shoulder Internal Rotation   Martin Drilling  [address] [facility] [address]  [address]    Shoulder External Rotation                                 Martin Drilling                   Strength:    Joint: Eval Date: 12/11/2020  Re-Assess Date:  Re-Assess Date:     RIGHT LEFT RIGHT LEFT RIGHT LEFT   Shoulder Flexion 4/5 4/5           Shoulder Abduction  (C5) 4/5 4/5           Shoulder Internal Rotation 5/5 5/5           Shoulder External Rotation 4/5 4+/5           Elbow Flexion  (C6) 5/5 5/5           Elbow Extension (C7) 4/5 5/5           Wrist Flexion (C7)             Wrist Extension (C6)             Resisted Thumb Extension/Finger Abduction (C8/T1)             Resisted Cervical Rotation (C1):             Resisted Shoulder Shrug (C2, 3, 4):               Strength                                                        Manual:  Joint Directon Grade Treatment Effect   Thoracic spine=hypomobile  PA III and IV Improved Symptoms post treatment                   Special Tests:     Kathryn-Freddie: Positive   Neer's: Negative    Neurological Screen: Assessed @ Initial Visit    Radiating symptoms? Yes/No=NO  Functional Mobility:  Assessed @ Initial Visit= limited thoracic ext and postural muscles         Body Structures Involved:  1. Bones  2. Joints  3. Muscles  4. Ligaments Body Functions Affected:  1. Sensory/Pain  2. Neuromusculoskeletal  3. Movement Related Activities and Participation Affected:  1. Mobility  2.  Self Care

## 2021-03-15 ENCOUNTER — HOSPITAL ENCOUNTER (OUTPATIENT)
Dept: GENERAL RADIOLOGY | Age: 56
Discharge: HOME | End: 2021-03-15

## 2021-03-15 DIAGNOSIS — Z86.79 HISTORY OF CARDIOMYOPATHY: ICD-10-CM

## 2021-03-15 DIAGNOSIS — Z86.16 HISTORY OF COVID-19: ICD-10-CM

## 2021-03-15 DIAGNOSIS — R06.02 SOB (SHORTNESS OF BREATH): ICD-10-CM

## 2021-03-16 NOTE — PROGRESS NOTES
Hear size WNL and lungs appear WNL.  Please call and notify patient. [facility]

## 2021-03-17 PROBLEM — R06.02 SOB (SHORTNESS OF BREATH): Status: ACTIVE | Noted: 2021-03-17

## 2021-04-06 ENCOUNTER — HOSPITAL ENCOUNTER (OUTPATIENT)
Dept: CT IMAGING | Age: 56
Discharge: HOME | End: 2021-04-06
Attending: INTERNAL MEDICINE

## 2021-04-06 ENCOUNTER — HOSPITAL ENCOUNTER (INPATIENT)
Age: 56
LOS: 2 days | Discharge: HOME | DRG: 176 | End: 2021-04-08
Attending: INTERNAL MEDICINE | Admitting: INTERNAL MEDICINE
Payer: COMMERCIAL

## 2021-04-06 ENCOUNTER — APPOINTMENT (OUTPATIENT)
Dept: ULTRASOUND IMAGING | Age: 56
DRG: 176 | End: 2021-04-06
Attending: PHYSICIAN ASSISTANT
Payer: COMMERCIAL

## 2021-04-06 DIAGNOSIS — U07.1 COVID-19: ICD-10-CM

## 2021-04-06 DIAGNOSIS — R06.02 SOB (SHORTNESS OF BREATH): ICD-10-CM

## 2021-04-06 DIAGNOSIS — R06.09 EXERTIONAL DYSPNEA: ICD-10-CM

## 2021-04-06 DIAGNOSIS — I26.99 PULMONARY EMBOLISM, BILATERAL (HCC): ICD-10-CM

## 2021-04-06 DIAGNOSIS — R29.818 SUSPECTED SLEEP APNEA: ICD-10-CM

## 2021-04-06 DIAGNOSIS — K21.9 GASTROESOPHAGEAL REFLUX DISEASE, UNSPECIFIED WHETHER ESOPHAGITIS PRESENT: ICD-10-CM

## 2021-04-06 DIAGNOSIS — Z87.19 HISTORY OF GI BLEED: ICD-10-CM

## 2021-04-06 DIAGNOSIS — K57.90 DIVERTICULOSIS: Chronic | ICD-10-CM

## 2021-04-06 DIAGNOSIS — G47.34 NOCTURNAL HYPOXIA: ICD-10-CM

## 2021-04-06 LAB
APTT PPP: 28.4 SEC (ref 24.1–35.1)
BASOPHILS # BLD: 0 K/UL (ref 0–0.2)
BASOPHILS NFR BLD: 1 % (ref 0–2)
DIFFERENTIAL METHOD BLD: ABNORMAL
EOSINOPHIL # BLD: 0.1 K/UL (ref 0–0.8)
EOSINOPHIL NFR BLD: 2 % (ref 0.5–7.8)
ERYTHROCYTE [DISTWIDTH] IN BLOOD BY AUTOMATED COUNT: 16.9 % (ref 11.9–14.6)
HCT VFR BLD AUTO: 40.9 % (ref 41.1–50.3)
HGB BLD-MCNC: 12.7 G/DL (ref 13.6–17.2)
IMM GRANULOCYTES # BLD AUTO: 0 K/UL (ref 0–0.5)
IMM GRANULOCYTES NFR BLD AUTO: 0 % (ref 0–5)
LYMPHOCYTES # BLD: 1.3 K/UL (ref 0.5–4.6)
LYMPHOCYTES NFR BLD: 33 % (ref 13–44)
MCH RBC QN AUTO: 27.7 PG (ref 26.1–32.9)
MCHC RBC AUTO-ENTMCNC: 31.1 G/DL (ref 31.4–35)
MCV RBC AUTO: 89.1 FL (ref 79.6–97.8)
MONOCYTES # BLD: 0.3 K/UL (ref 0.1–1.3)
MONOCYTES NFR BLD: 7 % (ref 4–12)
NEUTS SEG # BLD: 2.2 K/UL (ref 1.7–8.2)
NEUTS SEG NFR BLD: 57 % (ref 43–78)
NRBC # BLD: 0 K/UL (ref 0–0.2)
PLATELET # BLD AUTO: 186 K/UL (ref 150–450)
PMV BLD AUTO: 9.4 FL (ref 9.4–12.3)
RBC # BLD AUTO: 4.59 M/UL (ref 4.23–5.6)
UFH PPP CHRO-ACNC: 0.66 IU/ML (ref 0.3–0.7)
WBC # BLD AUTO: 3.9 K/UL (ref 4.3–11.1)

## 2021-04-06 PROCEDURE — 65660000000 HC RM CCU STEPDOWN

## 2021-04-06 PROCEDURE — 99223 1ST HOSP IP/OBS HIGH 75: CPT | Performed by: INTERNAL MEDICINE

## 2021-04-06 PROCEDURE — 74011250636 HC RX REV CODE- 250/636: Performed by: INTERNAL MEDICINE

## 2021-04-06 PROCEDURE — 85025 COMPLETE CBC W/AUTO DIFF WBC: CPT

## 2021-04-06 PROCEDURE — 85730 THROMBOPLASTIN TIME PARTIAL: CPT

## 2021-04-06 PROCEDURE — 93005 ELECTROCARDIOGRAM TRACING: CPT | Performed by: NURSE PRACTITIONER

## 2021-04-06 PROCEDURE — 36415 COLL VENOUS BLD VENIPUNCTURE: CPT

## 2021-04-06 PROCEDURE — 74011250636 HC RX REV CODE- 250/636: Performed by: NURSE PRACTITIONER

## 2021-04-06 PROCEDURE — 85520 HEPARIN ASSAY: CPT

## 2021-04-06 PROCEDURE — 93970 EXTREMITY STUDY: CPT

## 2021-04-06 PROCEDURE — 2709999900 HC NON-CHARGEABLE SUPPLY

## 2021-04-06 PROCEDURE — 74011250637 HC RX REV CODE- 250/637: Performed by: INTERNAL MEDICINE

## 2021-04-06 PROCEDURE — C8929 TTE W OR WO FOL WCON,DOPPLER: HCPCS

## 2021-04-06 PROCEDURE — 74011000250 HC RX REV CODE- 250: Performed by: INTERNAL MEDICINE

## 2021-04-06 RX ORDER — SODIUM CHLORIDE 0.9 % (FLUSH) 0.9 %
5-40 SYRINGE (ML) INJECTION EVERY 8 HOURS
Status: DISCONTINUED | OUTPATIENT
Start: 2021-04-06 | End: 2021-04-08 | Stop reason: HOSPADM

## 2021-04-06 RX ORDER — HEPARIN SODIUM 5000 [USP'U]/ML
80 INJECTION, SOLUTION INTRAVENOUS; SUBCUTANEOUS ONCE
Status: COMPLETED | OUTPATIENT
Start: 2021-04-06 | End: 2021-04-06

## 2021-04-06 RX ORDER — SODIUM CHLORIDE 0.9 % (FLUSH) 0.9 %
10 SYRINGE (ML) INJECTION
Status: COMPLETED | OUTPATIENT
Start: 2021-04-06 | End: 2021-04-06

## 2021-04-06 RX ORDER — SODIUM CHLORIDE 0.9 % (FLUSH) 0.9 %
5-40 SYRINGE (ML) INJECTION AS NEEDED
Status: DISCONTINUED | OUTPATIENT
Start: 2021-04-06 | End: 2021-04-08 | Stop reason: HOSPADM

## 2021-04-06 RX ORDER — DOCUSATE SODIUM 100 MG/1
100 CAPSULE, LIQUID FILLED ORAL 2 TIMES DAILY
Status: DISCONTINUED | OUTPATIENT
Start: 2021-04-06 | End: 2021-04-08 | Stop reason: HOSPADM

## 2021-04-06 RX ORDER — MORPHINE SULFATE 2 MG/ML
2 INJECTION, SOLUTION INTRAMUSCULAR; INTRAVENOUS
Status: DISCONTINUED | OUTPATIENT
Start: 2021-04-06 | End: 2021-04-08 | Stop reason: HOSPADM

## 2021-04-06 RX ORDER — ACETAMINOPHEN 325 MG/1
650 TABLET ORAL
Status: DISCONTINUED | OUTPATIENT
Start: 2021-04-06 | End: 2021-04-08 | Stop reason: HOSPADM

## 2021-04-06 RX ORDER — NITROGLYCERIN 0.4 MG/1
0.4 TABLET SUBLINGUAL
Status: DISCONTINUED | OUTPATIENT
Start: 2021-04-06 | End: 2021-04-08 | Stop reason: HOSPADM

## 2021-04-06 RX ORDER — HEPARIN SODIUM 5000 [USP'U]/100ML
18-36 INJECTION, SOLUTION INTRAVENOUS
Status: DISCONTINUED | OUTPATIENT
Start: 2021-04-06 | End: 2021-04-08

## 2021-04-06 RX ADMIN — PERFLUTREN 1 ML: 6.52 INJECTION, SUSPENSION INTRAVENOUS at 17:05

## 2021-04-06 RX ADMIN — Medication 10 ML: at 16:41

## 2021-04-06 RX ADMIN — HEPARIN SODIUM 6750 UNITS: 5000 INJECTION INTRAVENOUS; SUBCUTANEOUS at 16:36

## 2021-04-06 RX ADMIN — DOCUSATE SODIUM 100 MG: 100 CAPSULE, LIQUID FILLED ORAL at 18:21

## 2021-04-06 RX ADMIN — Medication 10 ML: at 13:29

## 2021-04-06 RX ADMIN — HEPARIN SODIUM AND DEXTROSE 18 UNITS/KG/HR: 5000; 5 INJECTION INTRAVENOUS at 16:38

## 2021-04-06 NOTE — CONSULTS
CONSULT NOTE    Carlos Donnelly    4/6/2021    Date of Admission:  4/6/2021    The patient's chart is reviewed and the patient is discussed with the staff. Subjective:     Patient is a 64 y.o.  male seen and evaluated at the request of Dr. Violeta Keane. He has a history of HTN, depression, GERD, GI bleed. He was diagnosed with Covid December. This was a relatively mild case and was managed at home. His shortness of breath did not really start until about a month ago. He describes it as relatively sudden and relatively constant since that time. It does not seem to have been preceded by any LE edema, redness, or calf pain. No chest pain, cough, or hemoptysis. He was seen by cardiology with workup failing to reveal a cardiac cause of his dyspnea. He was sent for a CT PE today which showed sizable B PE's. He has been admitted and started on heparin drip. He states this is his first episode of clot. No family history of clotting disorders. He is not on any hormone therapy and is not currently a smoker. No known cancers. He does have the above mentioned history of GI bleed. Had diverticular bleeds in Feb and Sept of 2020 both requiring hospitalizations. He states he has modified his diet with increased fiber without recurrence since then. He also had a partial colon resection for the same about 10 years ago. Review of Systems  A comprehensive review of systems was negative except for that written in the HPI.     Patient Active Problem List   Diagnosis Code    Alcoholism /alcohol abuse [id number]    [facility] I42.9    Essential hypertension with goal blood pressure less than 130/85 I10    Anxiety F41.9    Recurrent depression ([facility] F33.9    PVCs (premature ventricular contractions) I49.3    GERD (gastroesophageal reflux disease) K21.9    GI bleed K92.2    Diverticulosis K57.90    Exertional dyspnea R06.00    Pulmonary embolism, bilateral ([facility] I26.99    Pulmonary embolism (HCC) I26.99    History of GI bleed Z87.19     Prior to Admission Medications   Prescriptions Last Dose Informant Patient Reported? Taking? B.infantis-B.ani-B.long-B.bifi (Probiotic 4X) 10-15 mg TbEC   Yes No   Sig: Take 1 Tab by mouth daily. acetaminophen (TylenoL) 325 mg tablet   Yes No   Sig: Take 325 mg by mouth every four (4) hours as needed for Pain. ascorbic acid (VITAMIN C) 1,000 mg tablet   Yes No   Sig: Take 1,000 mg by mouth every morning. cyanocobalamin 1,000 mcg tablet   Yes No   Sig: Take 1,000 mcg by mouth daily. multivitamin (ONE A DAY) tablet   Yes No   Sig: Take 1 Tab by mouth every morning. Facility-Administered Medications: None       Past Medical History:   Diagnosis Date    Alcoholism /alcohol abuse ([facility]     Anxiety     [facility] 6[facility]2016    Diverticular disease     colon resection with no symptoms since surgery    Essential hypertension     managed with med.  GERD (gastroesophageal reflux disease)     takes medication but not altogether relieved    Hiatal hernia with gastroesophageal reflux 2/5/2013    Hypertension     managed with med.       Past Surgical History:   Procedure Laterality Date    COLONOSCOPY N/A 2/24/2020    COLONOSCOPY ROOM 517 performed by Ruby Davidson MD at [place] [address]. N/A 9/24/2020    COLONOSCOPY /29/ Room 630 performed by Pamella Villar MD at [facility] HX COLONOSCOPY  3/2018, 2008    10 years    HX COLONOSCOPY  2020     2 in 2020    HX CYST REMOVAL  2017    renal cyst    HX HERNIA REPAIR      Juan Manuel fundiplication    HX TOTAL COLECTOMY  4/2008     Social History     Socioeconomic History    Marital status:      Spouse name: Not on file    Number of children: Not on file    Years of education: Not on file    Highest education level: Not on file   Occupational History    Not on file   Social Needs    Financial resource strain: Not on file    Food insecurity Worry: Not on file     Inability: Not on file    Transportation needs     Medical: Not on file     Non-medical: Not on file   Tobacco Use    Smoking status: Former Smoker     Years: 20.00     Quit date: 2015     Years since quittin.3    Smokeless tobacco: Never Used   Substance and Sexual Activity    Alcohol use: No     Alcohol/week: 0.0 standard drinks     Comment: last drink 3/2015 sober    Drug use: No    Sexual activity: Yes     Partners: Female   Lifestyle    Physical activity     Days per week: Not on file     Minutes per session: Not on file    Stress: Not on file   Relationships    Social connections     Talks on phone: Not on file     Gets together: Not on file     Attends Hinduism service: Not on file     Active member of club or organization: Not on file     Attends meetings of clubs or organizations: Not on file     Relationship status: Not on file    Intimate partner violence     Fear of current or ex partner: Not on file     Emotionally abused: Not on file     Physically abused: Not on file     Forced sexual activity: Not on file   Other Topics Concern    Not on file   Social History Narrative    Not on file     Family History   Problem Relation Age of Onset    Cancer Father         brain    Breast Cancer Mother     No Known Problems Brother      Allergies   Allergen Reactions    Pcn [Penicillins] Hives       Current Facility-Administered Medications   Medication Dose Route Frequency    acetaminophen (TYLENOL) tablet 650 mg  650 mg Oral Q4H PRN    sodium chloride (NS) flush 5-40 mL  5-40 mL IntraVENous Q8H    sodium chloride (NS) flush 5-40 mL  5-40 mL IntraVENous PRN    nitroglycerin (NITROSTAT) tablet 0.4 mg  0.4 mg SubLINGual Q5MIN PRN    morphine injection 2 mg  2 mg IntraVENous Q4H PRN    heparin 25,000 units in dextrose 500 mL infusion  18-36 Units/kg/hr IntraVENous TITRATE         Objective:     Vitals:    21 1614   BP: 113/70   Pulse: 87   Resp: 20   Temp: 98.3 °F (36.8 °C)   SpO2: 94%   Weight: 186 lb (84.4 kg)   Height: 5' 8\" (1.727 m)       PHYSICAL EXAM     Constitutional:  the patient is well developed and in no acute distress  EENMT:  Sclera clear, pupils equal, oral mucosa moist  Respiratory: CTA B, no w/r/r  Cardiovascular:  RRR without M,G,R  Gastrointestinal: soft and non-tender; with positive bowel sounds. Musculoskeletal: warm without cyanosis. There is no lower extremity edema. Skin:  no jaundice or rashes, no wounds   Neurologic: no gross neuro deficits     Psychiatric:  alert and oriented x ppt    CT Chest 4/6/21:        Recent Labs     04/06/21  1617   WBC 3.9*   HGB 12.7*   HCT 40.9*        No results for input(s): NA, K, CL, GLU, CO2, BUN, CREA, MG, PHOS, CA, TROIQ, ALB, TBIL, TBILI, ALT, BNPP in the last 72 hours. No lab exists for component: TROIP, GPT, SGOT  No results for input(s): PH, PCO2, PO2, HCO3, PHI, PCO2I, PO2I, HCO3I in the last 72 hours. No results for input(s): LCAD, LAC in the last 72 hours. Assessment:  (Medical Decision Making)     Hospital Problems  Date Reviewed: 3/31/2021          Codes Class Noted POA    Pulmonary embolism, bilateral ([facility] ICD-10-CM: I26.99  ICD-9-CM: 415.19  4/6/2021 Unknown        History of GI bleed ICD-10-CM: Z87.19  ICD-9-CM: V12.79  4/6/2021 Unknown        Exertional dyspnea ICD-10-CM: R06.00  ICD-9-CM: 786.09  3/17/2021 Unknown        Diverticulosis (Chronic) ICD-10-CM: K57.90  ICD-9-CM: 562.10  2/15/2020 Yes        GERD (gastroesophageal reflux disease) ICD-10-CM: K21.9  ICD-9-CM: 530.81  12/19/2018 Unknown            Patient with large, R>L B PE's. No signs of R heart strain on bedside echo. First episode of clot. Possible inciting events include recent covid infection, though his symptom onset was about 2 months following his covid illness, and increased sedentary lifestyle with onset of [address] classes.      Plan:  (Medical Decision Making)     --will consult IR to see if patient may be a candidate for EKOS to address the largest and most centrally located of these clots.   -in the meantime agree with heparin drip. -docusate to minimize straining with BM's.   -agree with bedrest for now, however will want to cautiously ambulate in the next few days. -monitor for any GI bleeding given his h/o diverticular bleeds. Discussed this possibility with him and the need for IVC filter placement should this occur. He is willing to try anticoagulation first, however. -LE doppler already ordered. More than 50% of the time documented was spent in face-to-face contact with the patient and in the care of the patient on the floor/unit where the patient is located. Thank you very much for this referral.  We appreciate the opportunity to participate in this patient's care. Will follow along with above stated plan.     Angela Grubbs MD

## 2021-04-06 NOTE — H&P
[facility] History & Physical      Date of  Admission: 4/6/2021  3:40 PM     Primary Care Physician: Dr Valerie Pardo   Primary Cardiologist: Dr Naatlie Mitchell Physician: Dr Tami Loya    CC: worsening exertional SOB    HPI:  Juana Bruno is a 64 y.o. male with PMH of HTN, PVCs, mild to moderate mitral regurgitation, GERD s/p Nissen Fundoplication 2-97 years ago, severe GI bleeding due to extensive diverticulosis last year in February & September, and COVID-19 infection in 12/2020 who was seen at the office of [facility] by Dr Tami Loya for worsening exertional dyspnea. Pt reported worsening exertional dyspnea going on for about one month. Pt had ECHO that showed normal left ventricular regional wall motion and ejection fraction with mild to moderate eccentric moderate mitral regurgitation and trivial valvular insufficiency otherwise. Pulmonary pressures were not estimated. Nuclear stress test showed normal perfusion with no ischemia with ejection fraction 61% and normal left ventricular regional wall motion. He remains short of breath with normal activities of daily living. While in office patient ambulated 200 feet and oxygen saturation dropped to 87% with increased dyspnea. Pt was scheduled for outpatient CT chest today and was found to have multiple bilateral pulmonary emboli. Pt was directly admitted for further care. Past Medical History:   Diagnosis Date    Alcoholism /alcohol abuse ([address]     Anxiety     Cardiomyopathy ([facility] 6/30/2016    Diverticular disease     colon resection with no symptoms since surgery    Essential hypertension     managed with med.  GERD (gastroesophageal reflux disease)     takes medication but not altogether relieved    Hiatal hernia with gastroesophageal reflux 2/5/2013    Hypertension     managed with med.        Past Surgical History:   Procedure Laterality Date    COLONOSCOPY N/A 2/24/2020    COLONOSCOPY ROOM 517 performed by Sonny Slade MD LAISHA at [facility] ENDOSCOPY    COLONOSCOPY N/A 2020    COLONOSCOPY /29/ Room 630 performed by Aenl Sanchez MD at [facility] HX COLONOSCOPY  3/2018,     10 years    HX COLONOSCOPY  2020     2 in 2020    HX CYST REMOVAL      renal cyst    HX HERNIA REPAIR      Juan Manuel fundiplication    HX TOTAL COLECTOMY  2008       Allergies   Allergen Reactions    Pcn [Penicillins] Hives      Social History     Socioeconomic History    Marital status:      Spouse name: Not on file    Number of children: Not on file    Years of education: Not on file    Highest education level: Not on file   Occupational History    Not on file   Social Needs    Financial resource strain: Not on file    Food insecurity     Worry: Not on file     Inability: Not on file    Transportation needs     Medical: Not on file     Non-medical: Not on file   Tobacco Use    Smoking status: Former Smoker     Years: 20.00     Quit date: 2015     Years since quittin.3    Smokeless tobacco: Never Used   Substance and Sexual Activity    Alcohol use: No     Alcohol/week: 0.0 standard drinks     Comment: last drink 3/2015 sober    Drug use: No    Sexual activity: Yes     Partners: Female   Lifestyle    Physical activity     Days per week: Not on file     Minutes per session: Not on file    Stress: Not on file   Relationships    Social connections     Talks on phone: Not on file     Gets together: Not on file     Attends Congregation service: Not on file     Active member of club or organization: Not on file     Attends meetings of clubs or organizations: Not on file     Relationship status: Not on file    Intimate partner violence     Fear of current or ex partner: Not on file     Emotionally abused: Not on file     Physically abused: Not on file     Forced sexual activity: Not on file   Other Topics Concern    Not on file   Social History Narrative    Not on file     Family History   Problem Relation Age of Onset  Cancer Father         brain    Breast Cancer Mother     No Known Problems Brother         Current Facility-Administered Medications   Medication Dose Route Frequency    acetaminophen (TYLENOL) tablet 650 mg  650 mg Oral Q4H PRN    sodium chloride (NS) flush 5-40 mL  5-40 mL IntraVENous Q8H    sodium chloride (NS) flush 5-40 mL  5-40 mL IntraVENous PRN    nitroglycerin (NITROSTAT) tablet 0.4 mg  0.4 mg SubLINGual Q5MIN PRN    morphine injection 2 mg  2 mg IntraVENous Q4H PRN    heparin (porcine) injection 6,750 Units  80 Units/kg IntraVENous ONCE    heparin 25,000 units in dextrose 500 mL infusion  18-36 Units/kg/hr IntraVENous TITRATE     Review of Symptoms:  General: No weight changes, weakness, fever or chills  Skin: no rashes, lumps, or other skin changes  HEENT: Occasional  Dizziness. No lightheadedness, vision changes, hearing changes, tinnitus, vertigo, sinus pressure/pain, bleeding gums, sore throat, or hoarseness  Neck: no swollen glands, goiter, pain or stiffness  Respiratory: Positive for dyspnea. No cough, sputum, hemoptysis, wheezing  Cardiovascular: Positive for exertional dyspnea.  No chest pain or discomfort, palpitations, orthopnea, paroxysmal nocturnal dyspnea, peripheral edema   Gastrointestinal: Positive for GERD, extensive diverticulosis, GI bleed x 2   Urinary: no frequency, urgency , hematuria, burning/pain with urination, recent flank pain, polyuria, nocturia, or difficulty urinating  Peripheral Vascular: no claudication, leg cramps, prior DVTs, swelling of calves, legs, or feet, color change, or swelling with redness or tenderness  Musculoskeletal: no muscle or joint pain/stiffness, joint swelling, erythema of joints, or back pain  Psychiatric: no depression or excessive stress  Neurological: no sensory or motor loss, seizures, syncope, tremors, numbness, no dementia  Hematologic: no anemia, easy bruising or bleeding  Endocrine: No thyroid problems, heat or cold intolerance, excessive sweating, polyuria, polydipsia, diabetes. Subjective:     Physical Exam:    Vitals:    04/06/21 1614   BP: 113/70   Pulse: 87   Resp: 20   Temp: 98.3 °F (36.8 °C)   SpO2: 94%   Weight: 84.4 kg (186 lb)   Height: 5' 8\" (1.727 m)      General: Well Developed, Well Nourished, No Acute Distress  HEENT: pupils equal and round, no abnormalities noted  Neck: supple, no JVD, no carotid bruits  Heart: S1S2 with RRR without murmurs or gallops  Lungs: Clear throughout auscultation bilaterally without adventitious sounds  Abd: soft, nontender, nondistended, with good bowel sounds  Ext: warm, no edema, calves supple/nontender, pulses 2+ bilaterally  Skin: warm and dry  Psychiatric: Normal mood and affect  Neurologic: Alert and oriented X 3    Cardiographics    Telemetry: normal sinus rhythm  ECG: ordered, pending   Echocardiogram: 3/24/21- Echo showed normal left ventricular regional wall motion and ejection fraction with mild to moderate eccentric moderate mitral regurgitation and trivial valvular insufficiency otherwise. Pulmonary pressures were not estimated. Labs: No results found for this or any previous visit (from the past 24 hour(s)). Pt has been seen and examined by Dr. Hayes Burkitt and he agrees with the following assessment and plan:     Assessment/Plan:        Multiple bilateral pulmonary emboli ([address] (4/6/2021)- admit, start heparin gtt, bilateral US LE, check STAT ECHO, consult pulmonary, monitor daily labs    Exertional dyspnea- as above    HTN- stable without medications, cont to monitor while inpatient     GERD s/p Nissen Fundoplication/Extensive diverticulosis with GI bleed x 2- monitor daily labs while on heparin    COVID-19 infection in December       Jose Duran Alalutherma   Supervising Physician: Dr Gilda Rooney:    Patient seen and examined by me. Agree with above note by physician extender.   Key findings are:  No CP, CHF, or arrhythmia symptoms, but persistent dyspnea on exertion ever since Covid diagnosis a few months ago. Symptoms of dyspnea occurred acutely and have been persistent ever since then. Nuclear stress test and an echo in our office recently were unremarkable with benign findings. He remained short of breath at a recent office visit with a drop in O2 saturation to 87% with increased dyspnea on ambulating about 200 feet in the office. Exam is otherwise normal.  Angiogram was performed today as part of the continuing work-up which revealed bilateral right greater than left pulmonary emboli. He is still short of breath with exertion but comfortable at rest.  He denies any lower extremity edema or calf/thigh/hamstring pain to suggest significant DVT but it is likely he has a hypercoagulable state potentially due to Covid with resultant pulmonary embolus. Unfortunately he had significant GI bleeding twice last year without obvious source. He is status post Niesen fundoplication as well with additional extensive diverticulosis. Hemoglobin is currently stable and he denies any recent blood loss since September of last year. He is willing to start heparin and we will follow closely to look for obvious source of GI blood loss and follow daily hemoglobin and hematocrit. Starting a heparin drip now. Checking an echo for right-sided chamber size and function, and scanning lower extremities for occult deep vein thrombosis even though he is clinically asymptomatic. Pulmonary is consulted.   CV- RRR without murmur, no S3 or S4, no jugular venous distention at 45 degrees  Lungs- Clear bilaterally  Abd- soft, nontender, nondistended  Ext- no edema    Plan:   Admit, check labs in the morning and replete electrolytes as needed  Heparin drip per weight-based protocol  Convert to oral anticoagulant of pulmonary choice as long as he has no drop in hemoglobin or obvious GI bleeding  Scan lower extremities for DVT  Echo now for right sided chamber size, function, and PA pressure estimation  Telemetry  Pulmonary consult      RAAD Mason MD  [address] Cardiology  Pager [phone]

## 2021-04-06 NOTE — ROUTINE PROCESS
Patient received to room 307 as direct admit. Patient oriented to room, call light and plan of care. Admission assessment completed. Admission skin assessment completed with second RN and reveals the following: Patient has sun burn. Tattoo on the right arm. Sacrum and heels are free from any breakdown.

## 2021-04-07 PROBLEM — R29.818 SUSPECTED SLEEP APNEA: Status: ACTIVE | Noted: 2021-04-07

## 2021-04-07 LAB
ALBUMIN SERPL-MCNC: 3.3 G/DL (ref 3.5–5)
ALBUMIN/GLOB SERPL: 1 {RATIO} (ref 1.2–3.5)
ALP SERPL-CCNC: 43 U/L (ref 50–136)
ALT SERPL-CCNC: 21 U/L (ref 12–65)
ANION GAP SERPL CALC-SCNC: 4 MMOL/L (ref 7–16)
AST SERPL-CCNC: 11 U/L (ref 15–37)
ATRIAL RATE: 78 BPM
BASOPHILS # BLD: 0 K/UL (ref 0–0.2)
BASOPHILS NFR BLD: 1 % (ref 0–2)
BILIRUB SERPL-MCNC: 0.3 MG/DL (ref 0.2–1.1)
BUN SERPL-MCNC: 11 MG/DL (ref 6–23)
CALCIUM SERPL-MCNC: 8.9 MG/DL (ref 8.3–10.4)
CALCULATED P AXIS, ECG09: 46 DEGREES
CALCULATED R AXIS, ECG10: 13 DEGREES
CALCULATED T AXIS, ECG11: 16 DEGREES
CHLORIDE SERPL-SCNC: 109 MMOL/L (ref 98–107)
CO2 SERPL-SCNC: 27 MMOL/L (ref 21–32)
CREAT SERPL-MCNC: 0.76 MG/DL (ref 0.8–1.5)
DIAGNOSIS, 93000: NORMAL
DIFFERENTIAL METHOD BLD: ABNORMAL
EOSINOPHIL # BLD: 0.1 K/UL (ref 0–0.8)
EOSINOPHIL NFR BLD: 3 % (ref 0.5–7.8)
ERYTHROCYTE [DISTWIDTH] IN BLOOD BY AUTOMATED COUNT: 16.7 % (ref 11.9–14.6)
GLOBULIN SER CALC-MCNC: 3.3 G/DL (ref 2.3–3.5)
GLUCOSE SERPL-MCNC: 113 MG/DL (ref 65–100)
HCT VFR BLD AUTO: 40.2 % (ref 41.1–50.3)
HGB BLD-MCNC: 12.7 G/DL (ref 13.6–17.2)
IMM GRANULOCYTES # BLD AUTO: 0 K/UL (ref 0–0.5)
IMM GRANULOCYTES NFR BLD AUTO: 0 % (ref 0–5)
LYMPHOCYTES # BLD: 1.5 K/UL (ref 0.5–4.6)
LYMPHOCYTES NFR BLD: 38 % (ref 13–44)
MCH RBC QN AUTO: 27.8 PG (ref 26.1–32.9)
MCHC RBC AUTO-ENTMCNC: 31.6 G/DL (ref 31.4–35)
MCV RBC AUTO: 88 FL (ref 79.6–97.8)
MONOCYTES # BLD: 0.3 K/UL (ref 0.1–1.3)
MONOCYTES NFR BLD: 7 % (ref 4–12)
NEUTS SEG # BLD: 2 K/UL (ref 1.7–8.2)
NEUTS SEG NFR BLD: 51 % (ref 43–78)
NRBC # BLD: 0 K/UL (ref 0–0.2)
P-R INTERVAL, ECG05: 156 MS
PLATELET # BLD AUTO: 191 K/UL (ref 150–450)
PMV BLD AUTO: 9.7 FL (ref 9.4–12.3)
POTASSIUM SERPL-SCNC: 3.9 MMOL/L (ref 3.5–5.1)
PROT SERPL-MCNC: 6.6 G/DL (ref 6.3–8.2)
Q-T INTERVAL, ECG07: 372 MS
QRS DURATION, ECG06: 92 MS
QTC CALCULATION (BEZET), ECG08: 424 MS
RBC # BLD AUTO: 4.57 M/UL (ref 4.23–5.6)
SODIUM SERPL-SCNC: 140 MMOL/L (ref 136–145)
UFH PPP CHRO-ACNC: 0.58 IU/ML (ref 0.3–0.7)
UFH PPP CHRO-ACNC: 0.64 IU/ML (ref 0.3–0.7)
UFH PPP CHRO-ACNC: 0.94 IU/ML (ref 0.3–0.7)
VENTRICULAR RATE, ECG03: 78 BPM
WBC # BLD AUTO: 3.9 K/UL (ref 4.3–11.1)

## 2021-04-07 PROCEDURE — 99233 SBSQ HOSP IP/OBS HIGH 50: CPT | Performed by: INTERNAL MEDICINE

## 2021-04-07 PROCEDURE — 85520 HEPARIN ASSAY: CPT

## 2021-04-07 PROCEDURE — 65660000000 HC RM CCU STEPDOWN

## 2021-04-07 PROCEDURE — 85025 COMPLETE CBC W/AUTO DIFF WBC: CPT

## 2021-04-07 PROCEDURE — 74011250637 HC RX REV CODE- 250/637: Performed by: INTERNAL MEDICINE

## 2021-04-07 PROCEDURE — 36415 COLL VENOUS BLD VENIPUNCTURE: CPT

## 2021-04-07 PROCEDURE — 94760 N-INVAS EAR/PLS OXIMETRY 1: CPT

## 2021-04-07 PROCEDURE — 99232 SBSQ HOSP IP/OBS MODERATE 35: CPT | Performed by: INTERNAL MEDICINE

## 2021-04-07 PROCEDURE — 74011250636 HC RX REV CODE- 250/636: Performed by: NURSE PRACTITIONER

## 2021-04-07 PROCEDURE — 80053 COMPREHEN METABOLIC PANEL: CPT

## 2021-04-07 RX ORDER — AMOXICILLIN 250 MG
1 CAPSULE ORAL
Status: DISCONTINUED | OUTPATIENT
Start: 2021-04-07 | End: 2021-04-08 | Stop reason: HOSPADM

## 2021-04-07 RX ADMIN — HEPARIN SODIUM AND DEXTROSE 16 UNITS/KG/HR: 5000; 5 INJECTION INTRAVENOUS at 09:43

## 2021-04-07 RX ADMIN — DOCUSATE SODIUM 100 MG: 100 CAPSULE, LIQUID FILLED ORAL at 17:05

## 2021-04-07 RX ADMIN — DOCUSATE SODIUM 100 MG: 100 CAPSULE, LIQUID FILLED ORAL at 09:44

## 2021-04-07 RX ADMIN — Medication 10 ML: at 21:10

## 2021-04-07 NOTE — CONSULTS
Department of Interventional Radiology  [phone]        Consult Note     Patient: Beto Farmer MRN: [id number]  SSN: [id number]    YOB: 1965  Age: 64 y.o. Sex: male      Referring Physician: Dr. Vic Plasencia Date: 4/7/2021     Subjective:     Chief Complaint: PE    History of Present Illness: Beto Farmer is a 64 y.o. male who is seen in consultation for possible EKOS thrombolysis. Pt was directly admitted from his cardiologist office after a CT scan revealed bilateral PE, largest RLL PA. Pt c/o exertional dyspnea x 1 month. He had COVID-19 12/2020 which did not require hospitalization. Hx severe diverticular bleed requiring hospitalization Sept, Feb 2020 and 10 years ago. Since his last episode, he has changed to a healthier diet. LE US reveals DVT left peroneal vein. No heart strain on ECHO. He denies hx a cancer, prior DVT. No CP, SOB currently. Denies LE swelling. Comfortable breathing while at rest. He has not had a BM this admission. Wife at bedside. He has a sedentary desk job. Past Medical History:   Diagnosis Date    [facility]ism /al[facility] abuse ([facility]     Anxiety     Cardiomyopathy ([facility] 6/30/2016    Diverticular disease     colon resection with no symptoms since surgery    Essential hypertension     managed with med.  GERD (gastroesophageal reflux disease)     takes medication but not altogether relieved    Hiatal hernia with gastroesophageal reflux 2/5/2013    Hypertension     managed with med.       Past Surgical History:   Procedure Laterality Date    COLONOSCOPY N/A 2/24/2020    COLONOSCOPY ROOM 517 performed by Allie Sears MD at [address] [place] [address] N/A 9/24/2020    COLONOSCOPY /29/ Room 630 performed by Pat Stage, MD at [place] [address] HX COLONOSCOPY  3/2018, 2008    10 years    HX COLONOSCOPY  2020     2 in 2020    HX CYST REMOVAL  2017    renal cyst    HX HERNIA REPAIR      Juan Manuel fundiplication    HX TOTAL COLECTOMY  2008      Family History   Problem Relation Age of Onset    Cancer Father         brain    Breast Cancer Mother     No Known Problems Brother      Social History     Tobacco Use    Smoking status: Former Smoker     Years: 20.00     Quit date: 2015     Years since quittin.3    Smokeless tobacco: Never Used   Substance Use Topics    Alcohol use: No     Alcohol/week: 0.0 standard drinks     Comment: last drink 3/2015 sober      Allergies   Allergen Reactions    Pcn [Penicillins] Hives     Current Facility-Administered Medications   Medication Dose Route Frequency Provider Last Rate Last Admin    acetaminophen (TYLENOL) tablet 650 mg  650 mg Oral Q4H PRN Samira David NP        sodium chloride (NS) flush 5-40 mL  5-40 mL IntraVENous Q8H Juana Life D, NP   10 mL at 21 1641    sodium chloride (NS) flush 5-40 mL  5-40 mL IntraVENous PRN Samira David NP        nitroglycerin (NITROSTAT) tablet 0.4 mg  0.4 mg SubLINGual Q5MIN PRN Samira David NP        morphine injection 2 mg  2 mg IntraVENous Q4H PRN Samira David NP        heparin 25,000 units in dextrose 500 mL infusion  18-36 Units/kg/hr IntraVENous TITRATE Samira David NP 27 mL/hr at 21 0818 16 Units/kg/hr at 21 0818    docusate sodium (COLACE) capsule 100 mg  100 mg Oral BID Portia Moore MD   100 mg at 21 1821       Medications Prior to Admission   Medication Sig    B.infantis-B.ani-B.long-B.bifi (Probiotic 4X) 10-15 mg TbEC Take 1 Tab by mouth daily.  cyanocobalamin 1,000 mcg tablet Take 1,000 mcg by mouth daily.  ascorbic acid (VITAMIN C) 1,000 mg tablet Take 1,000 mg by mouth every morning.  multivitamin (ONE A DAY) tablet Take 1 Tab by mouth every morning. Allergies   Allergen Reactions    Pcn [Penicillins] Hives       Review of Systems:  Pertinent positives and negatives in HPI.     Objective:     Physical Exam:  Vitals:    21 2123 21 0020 21 0513 04/07/21 0819   BP: 116/71 (!) 101/58 101/85 126/76   Pulse: 81 74 (!) 50 67   Resp: 20 20 20 20   Temp: 97.4 °F (36.3 °C) 98.2 °F (36.8 °C) 97.2 °F (36.2 °C) 97.5 °F (36.4 °C)   SpO2: 94% 95% 95% 96%   Weight:   88 kg (194 lb)    Height:            Pain Assessment  Pain Intensity 1: 0 (04/07/21 0043)        Patient Stated Pain Goal: 0      HEART: regular rate and rhythm  LUNG: clear to auscultation bilaterally  ABDOMEN: normal findings: soft, non-tender  EXTREMITIES: warm, no edema  Lab/Data Review:  BMP:   Lab Results   Component Value Date/Time     04/07/2021 07:17 AM    K 3.9 04/07/2021 07:17 AM     (H) 04/07/2021 07:17 AM    CO2 27 04/07/2021 07:17 AM    AGAP 4 (L) 04/07/2021 07:17 AM     (H) 04/07/2021 07:17 AM    BUN 11 04/07/2021 07:17 AM    CREA 0.76 (L) 04/07/2021 07:17 AM    GFRAA >60 04/07/2021 07:17 AM    GFRNA >60 04/07/2021 07:17 AM     CBC:   Lab Results   Component Value Date/Time    WBC 3.9 (L) 04/07/2021 07:17 AM    HGB 12.7 (L) 04/07/2021 07:17 AM    HCT 40.2 (L) 04/07/2021 07:17 AM     04/07/2021 07:17 AM     COAGS:   Lab Results   Component Value Date/Time    APTT 28.4 04/06/2021 04:17 PM         Assessment/Plan:   64 y.o. male with bilateral PE. He has no RV strain on ECHO, hemodynamically stable and comfortable breathing on room air. In addition, he has a history of severe diverticular bleed. Recommend anticoagulation with heparin, transition to Eliquis and no thrombolysis. Should anticoagulation become contraindicated, we discussed the possibility of an IVC filter. The patient agrees with this treatment plan. Images reviewed, chart notes and visit discussed with Anup Haq MD who agrees with above.       Sho Rodriguez, [facility] Problems  Date Reviewed: 3/31/2021          Codes Class Noted POA    Pulmonary embolism, bilateral ([address] ICD-10-CM: I26.99  ICD-9-CM: 415.19  4/6/2021 Unknown        History of GI bleed ICD-10-CM: Z87.19  ICD-9-CM: V12.79  4/6/2021 Unknown        Exertional dyspnea ICD-10-CM: R06.00  ICD-9-CM: 786.09  3/17/2021 Unknown        Diverticulosis (Chronic) ICD-10-CM: K57.90  ICD-9-CM: 562.10  2/15/2020 Yes        GERD (gastroesophageal reflux disease) ICD-10-CM: K21.9  ICD-9-CM: 530.81  12/19/2018 Unknown

## 2021-04-07 NOTE — ROUTINE PROCESS
Bedside and Verbal shift change report given to 64 [address] Petty RN (oncoming nurse) by  Julissa shah). Report included the following information SBAR, Kardex, Intake/Output, MAR and Recent Results.

## 2021-04-07 NOTE — ROUTINE PROCESS
Bedside and Verbal shift change report given to (oncoming nurse) by Sheela Moreland RN (offgoing nurse). Report included the following information SBAR, Kardex, Intake/Output, MAR and Recent Results.

## 2021-04-07 NOTE — PROGRESS NOTES
CM met with pt and spouse to discuss d/c planning. [place] PPE and social distancing were observed. A/O x4. Demographics verified. Pt resides with his spouse Misael Angeles [phone] in a one-story house. Pt does not have any home DME. Prior to hospitalization pt was independent with ADLs and was not receiving any HH services. Pt is employed and has insurance with pharmacy benefits. He is on RA. Pt admitted with Dx of Bilateral PE, DVT, Hx GI Bleed, HTN, PVC, GERD, Worsening Exertional Dyspnea, Anxiety, Hx ETOH Abuse, Diverticular Disease, Hiatal Hernia with GERD, and COVID-19 in Dec. 2020. No immediate needs identified. CM will continue to follow and remain available if any needs arise. Care Management Interventions  PCP Verified by CM: Yes(William J. Therese Primrose, MD)  Mode of Transport at Discharge:  Other (see comment)(Family)  Transition of Care Consult (CM Consult): Discharge Planning  Physical Therapy Consult: No  Occupational Therapy Consult: No  Speech Therapy Consult: No  Current Support Network: Lives with Spouse, Own Home, Family Lives Nearby  Confirm Follow Up Transport: Family  The Patient and/or Patient Representative was Provided with a Choice of Provider and Agrees with the Discharge Plan?: Yes  Name of the Patient Representative Who was Provided with a Choice of Provider and Agrees with the Discharge Plan: Adelina Weinstein \"Hieu\" Franklin Nap of Choice List was Provided with Basic Dialogue that Supports the Patient's Individualized Plan of Care/Goals, Treatment Preferences and Shares the Quality Data Associated with the Providers?: Yes  [employer] Information Provided?: No  Discharge Location  Discharge Placement: Home

## 2021-04-07 NOTE — PROGRESS NOTES
Shelly  Admission Date: 4/6/2021             Daily Progress Note: 4/7/2021    The patient's chart is reviewed and the patient is discussed with the staff. Patient is a 64 y.o.  male seen and evaluated at the request of Dr. Martin Salguero. He has a history of HTN, depression, GERD, GI bleed. He was diagnosed with Covid December. This was a relatively mild case and was managed at home. His shortness of breath did not really start until about a month ago. He describes it as relatively sudden and relatively constant since that time. It does not seem to have been preceded by any LE edema, redness, or calf pain. No chest pain, cough, or hemoptysis. He was seen by cardiology with workup failing to reveal a cardiac cause of his dyspnea.      He was sent for a CT PE today which showed sizable B PE's. He has been admitted and started on heparin drip.      He states this is his first episode of clot. No family history of clotting disorders. He is not on any hormone therapy and is not currently a smoker. No known cancers. He does have the above mentioned history of GI bleed. Had diverticular bleeds in Feb and Sept of 2020 both requiring hospitalizations. He states he has modified his diet with increased fiber without recurrence since then. He also had a partial colon resection for the same about 10 years ago. Doppler showed DVT LL peroneal vein. Subjective:     Patient laying in bed, states he feels good today. Still has some TERRELL but denies any SOB. He states he feels that he needs to have a bowel movement but is nervous about straining.     Current Facility-Administered Medications   Medication Dose Route Frequency    psyllium husk-aspartame (METAMUCIL FIBER) packet 1 Packet  1 Packet Oral DAILY PRN    acetaminophen (TYLENOL) tablet 650 mg  650 mg Oral Q4H PRN    sodium chloride (NS) flush 5-40 mL  5-40 mL IntraVENous Q8H    sodium chloride (NS) flush 5-40 mL  5-40 mL IntraVENous PRN    nitroglycerin (NITROSTAT) tablet 0.4 mg  0.4 mg SubLINGual Q5MIN PRN    morphine injection 2 mg  2 mg IntraVENous Q4H PRN    heparin 25,000 units in dextrose 500 mL infusion  18-36 Units/kg/hr IntraVENous TITRATE    docusate sodium (COLACE) capsule 100 mg  100 mg Oral BID       Review of Systems  +TERRELL  Constitutional: negative for fever, chills, sweats  Cardiovascular: negative for chest pain, palpitations, syncope, edema  Gastrointestinal:  negative for dysphagia, reflux, vomiting, diarrhea, abdominal pain, or melena  Neurologic:  negative for focal weakness, numbness, headache    Objective:     Vitals:    04/06/21 2123 04/07/21 0020 04/07/21 0513 04/07/21 0819   BP: 116/71 (!) 101/58 101/85 126/76   Pulse: 81 74 (!) 50 67   Resp: 20 20 20 20   Temp: 97.4 °F (36.3 °C) 98.2 °F (36.8 °C) 97.2 °F (36.2 °C) 97.5 °F (36.4 °C)   SpO2: 94% 95% 95% 96%   Weight:   194 lb (88 kg)    Height:             Intake/Output Summary (Last 24 hours) at 4/7/2021 1235  Last data filed at 4/7/2021 0948  Gross per 24 hour   Intake 240 ml   Output 1275 ml   Net -1035 ml       Physical Exam:   Constitution:  the patient is well developed and in no acute distress  EENMT:  Sclera clear, pupils equal, oral mucosa moist  Respiratory: clear on room air  Cardiovascular:  RRR without M,G,R  Gastrointestinal: soft and non-tender; with positive bowel sounds. Musculoskeletal: warm without cyanosis. There is no lower extremity edema.   Skin:  no jaundice or rashes, no wounds   Neurologic: no gross neuro deficits     Psychiatric:  alert and oriented x 3he left here after all of his office    CT Chest 4/6/21:          LAB    Recent Labs     04/07/21  0717 04/06/21  1617   WBC 3.9* 3.9*   HGB 12.7* 12.7*   HCT 40.2* 40.9*    186     Recent Labs     04/07/21  0717      K 3.9   *   CO2 27   *   BUN 11   CREA 0.76*   CA 8.9   ALB 3.3*   TBILI 0.3   ALT 21         Assessment:  (Medical Decision Making) Hospital Problems  Date Reviewed: 3/31/2021          Codes Class Noted POA    Pulmonary embolism, bilateral ([facility] [address] ICD-10-CM: I26.99  ICD-9-CM: 415.19  4/6/2021 Unknown        History of GI bleed ICD-10-CM: Z87.19  ICD-9-CM: V12.79  4/6/2021 Unknown        Exertional dyspnea ICD-10-CM: R06.00  ICD-9-CM: 786.09  3/17/2021 Unknown        Diverticulosis (Chronic) ICD-10-CM: K57.90  ICD-9-CM: 562.10  2/15/2020 Yes        GERD (gastroesophageal reflux disease) ICD-10-CM: K21.9  ICD-9-CM: 530.81  12/19/2018 Unknown            Patient with large, R>L B PE's. No signs of R heart strain on bedside echo. First episode of clot. Possible inciting events include recent covid infection, though his symptom onset was about 2 months following his covid illness, and increased sedentary lifestyle with onset of [address] classes. Doppler showed DVT LL peroneal vein. Plan:  (Medical Decision Making)     -- per IR recommendations continue anticoagulation, they do not recommend thrombolysis  -- will add PRN pericolace in case of constipation  -- Continue heparin gtt, Consider [place] [facility] [place]    More than 50% of the time documented was spent in face-to-face contact with the patient and in the care of the patient on the floor/unit where the patient is located. Aure Hay, NP    Lungs : decreased sounds in the Right lung base. No wheezing  Heart S1 and S2 audible, no murmers or rubs appreciated  Other     Will get overnight to r/o need for oxygen QHS  Check ambulatory saturation since dyspnea with exertion  Currently only to get IV heparin with transition to oral agents. Not a Thrombolytic canindate  Does snore and has marked nocturnal awakening -- will need to r/o PATRICK in the future -- currently suspect PATRICK    I have spoken with and examined the patient. I have reviewed the history, examination, assessment, and plan and agree with the above. Martha Avila MD      This note was signed electronically. Errors are unfortunately her likely due to dictation software.

## 2021-04-07 NOTE — ROUTINE PROCESS
Bedside shift change report given to self (oncoming nurse) by Robson Nunes (offgoing nurse). Report included the following information SBAR, Kardex, and MAR.

## 2021-04-07 NOTE — PROGRESS NOTES
[facility] CARDIOLOGY PROGRESS NOTE    4/7/2021 9:20 AM    Admit Date: 4/6/2021        Subjective:   Stable overnight without angina, CHF, or palpitations. Vitals stable and controlled. No other complaints overnight. Tolerating meds well. Lower extremity venous duplex positive for distal small vein thrombosis but no large main thrombus. Interventional radiology consultation pending. Pulmonary has seen the patient. On heparin but needs to convert to oral anticoagulation full dose per pulmonary embolism protocol if remains stable with no gastrointestinal bleeding today. .. Objective:      Vitals:    04/06/21 2123 04/07/21 0020 04/07/21 0513 04/07/21 0819   BP: 116/71 (!) 101/58 101/85 126/76   Pulse: 81 74 (!) 50 67   Resp: 20 20 20 20   Temp: 97.4 °F (36.3 °C) 98.2 °F (36.8 °C) 97.2 °F (36.2 °C) 97.5 °F (36.4 °C)   SpO2: 94% 95% 95% 96%   Weight:   194 lb (88 kg)    Height:           Physical Exam:  Neck- supple, no JVD  CV- regular rate and rhythm no MRG  Lung- clear bilaterally  Abd- soft, nontender, nondistended  Ext- no edema  Skin- warm and dry    Data Review:   Recent Labs     04/07/21  0717 04/06/21  1617     --    K 3.9  --    BUN 11  --    CREA 0.76*  --    *  --    WBC 3.9* 3.9*   HGB 12.7* 12.7*   HCT 40.2* 40.9*    186       Assessment and Plan:       Pulmonary embolism, bilateral (HCC) (4/6/2021)-on heparin, follow closely with history of GI bleeding, recheck hemoglobin in the morning. Convert to twice daily Xarelto per pulmonary embolism protocol as long as this is okay with pulmonary. Lower extremity venous duplex noted. Awaiting IR consultation. Echo without evidence for right sided chamber strain/dysfunction.      Active Problems:    GERD (gastroesophageal reflux disease) (12/19/2018)-stable, continue Protonix      Diverticulosis (2/15/2020)-no recent issues but 2 GI bleeds last year (see history and physical)      Exertional dyspnea (3/17/2021)-due to bilateral pulmonary emboli with history of recent Covid infection. See above    Deep vein thrombosis-Thrombosis of the mid left peroneal vein below the level of the knee. No evidence of DVT in the right leg. IR consultation pending. History of GI bleed (4/6/2021)-recheck CBC in the morning. No blood loss on heparin drip and tolerating well        RAAD Campos MD  [facility] Cardiology  Pager [phone]

## 2021-04-07 NOTE — PROGRESS NOTES
Oxygen Qualifier       Room air: SpO2 with O2 and liter flow   Resting SpO2  95%    Ambulating SpO2  94%          Completed by:    Natividad Justice, Respiratory Assistant

## 2021-04-08 VITALS
RESPIRATION RATE: 18 BRPM | WEIGHT: 191.5 LBS | OXYGEN SATURATION: 96 % | HEIGHT: 68 IN | HEART RATE: 96 BPM | SYSTOLIC BLOOD PRESSURE: 120 MMHG | DIASTOLIC BLOOD PRESSURE: 86 MMHG | BODY MASS INDEX: 29.02 KG/M2 | TEMPERATURE: 97.3 F

## 2021-04-08 PROBLEM — G47.34 NOCTURNAL HYPOXIA: Status: ACTIVE | Noted: 2021-04-08

## 2021-04-08 PROBLEM — I49.3 PVCS (PREMATURE VENTRICULAR CONTRACTIONS): Chronic | Status: ACTIVE | Noted: 2018-12-19

## 2021-04-08 PROBLEM — Z87.19 HISTORY OF GI BLEED: Chronic | Status: ACTIVE | Noted: 2021-04-06

## 2021-04-08 PROBLEM — F33.9 RECURRENT DEPRESSION (HCC): Chronic | Status: ACTIVE | Noted: 2017-12-20

## 2021-04-08 PROBLEM — I82.402 LEFT LEG DVT (HCC): Status: ACTIVE | Noted: 2021-04-08

## 2021-04-08 PROBLEM — I10 ESSENTIAL HYPERTENSION WITH GOAL BLOOD PRESSURE LESS THAN 130/85: Chronic | Status: ACTIVE | Noted: 2017-01-19

## 2021-04-08 PROBLEM — K21.9 GERD (GASTROESOPHAGEAL REFLUX DISEASE): Chronic | Status: ACTIVE | Noted: 2018-12-19

## 2021-04-08 LAB
ANION GAP SERPL CALC-SCNC: 5 MMOL/L (ref 7–16)
BASOPHILS # BLD: 0 K/UL (ref 0–0.2)
BASOPHILS NFR BLD: 1 % (ref 0–2)
BUN SERPL-MCNC: 8 MG/DL (ref 6–23)
CALCIUM SERPL-MCNC: 8.7 MG/DL (ref 8.3–10.4)
CHLORIDE SERPL-SCNC: 110 MMOL/L (ref 98–107)
CO2 SERPL-SCNC: 26 MMOL/L (ref 21–32)
CREAT SERPL-MCNC: 0.63 MG/DL (ref 0.8–1.5)
DIFFERENTIAL METHOD BLD: ABNORMAL
EOSINOPHIL # BLD: 0.2 K/UL (ref 0–0.8)
EOSINOPHIL NFR BLD: 4 % (ref 0.5–7.8)
ERYTHROCYTE [DISTWIDTH] IN BLOOD BY AUTOMATED COUNT: 16.8 % (ref 11.9–14.6)
GLUCOSE SERPL-MCNC: 108 MG/DL (ref 65–100)
HCT VFR BLD AUTO: 40.2 % (ref 41.1–50.3)
HGB BLD-MCNC: 12.5 G/DL (ref 13.6–17.2)
IMM GRANULOCYTES # BLD AUTO: 0 K/UL (ref 0–0.5)
IMM GRANULOCYTES NFR BLD AUTO: 0 % (ref 0–5)
LYMPHOCYTES # BLD: 1.4 K/UL (ref 0.5–4.6)
LYMPHOCYTES NFR BLD: 31 % (ref 13–44)
MAGNESIUM SERPL-MCNC: 2 MG/DL (ref 1.8–2.4)
MCH RBC QN AUTO: 27.8 PG (ref 26.1–32.9)
MCHC RBC AUTO-ENTMCNC: 31.1 G/DL (ref 31.4–35)
MCV RBC AUTO: 89.5 FL (ref 79.6–97.8)
MONOCYTES # BLD: 0.4 K/UL (ref 0.1–1.3)
MONOCYTES NFR BLD: 8 % (ref 4–12)
NEUTS SEG # BLD: 2.4 K/UL (ref 1.7–8.2)
NEUTS SEG NFR BLD: 55 % (ref 43–78)
NRBC # BLD: 0 K/UL (ref 0–0.2)
PLATELET # BLD AUTO: 172 K/UL (ref 150–450)
PMV BLD AUTO: 9.3 FL (ref 9.4–12.3)
POTASSIUM SERPL-SCNC: 3.5 MMOL/L (ref 3.5–5.1)
RBC # BLD AUTO: 4.49 M/UL (ref 4.23–5.6)
SODIUM SERPL-SCNC: 141 MMOL/L (ref 138–145)
UFH PPP CHRO-ACNC: 0.61 IU/ML (ref 0.3–0.7)
WBC # BLD AUTO: 4.3 K/UL (ref 4.3–11.1)

## 2021-04-08 PROCEDURE — 83735 ASSAY OF MAGNESIUM: CPT

## 2021-04-08 PROCEDURE — 74011250637 HC RX REV CODE- 250/637: Performed by: INTERNAL MEDICINE

## 2021-04-08 PROCEDURE — 85025 COMPLETE CBC W/AUTO DIFF WBC: CPT

## 2021-04-08 PROCEDURE — 99232 SBSQ HOSP IP/OBS MODERATE 35: CPT | Performed by: INTERNAL MEDICINE

## 2021-04-08 PROCEDURE — 36415 COLL VENOUS BLD VENIPUNCTURE: CPT

## 2021-04-08 PROCEDURE — 85520 HEPARIN ASSAY: CPT

## 2021-04-08 PROCEDURE — 80048 BASIC METABOLIC PNL TOTAL CA: CPT

## 2021-04-08 PROCEDURE — 99238 HOSP IP/OBS DSCHRG MGMT 30/<: CPT | Performed by: INTERNAL MEDICINE

## 2021-04-08 RX ADMIN — RIVAROXABAN 15 MG: 15 TABLET, FILM COATED ORAL at 08:54

## 2021-04-08 RX ADMIN — DOCUSATE SODIUM 100 MG: 100 CAPSULE, LIQUID FILLED ORAL at 08:53

## 2021-04-08 RX ADMIN — Medication 10 ML: at 05:27

## 2021-04-08 NOTE — ROUTINE PROCESS
Bedside and Verbal shift change report given to self (oncoming nurse) by Johnathan Waldron RN  (offgoing nurse). Report included the following information SBAR, Kardex, ED Summary, Procedure Summary, Intake/Output, MAR, Recent Results and Cardiac Rhythm SR.      Hep gtt rate verified

## 2021-04-08 NOTE — DISCHARGE SUMMARY
[facility] Cardiology Discharge Summary     Patient ID:  Piper Cook  [id number]  45 y.o.  1965    Admit date: 4/6/2021    Discharge date:  4/8/2021    Admitting Physician: Ángela Aponte MD     Discharge Physician: Dr. Rich New    Admission Diagnoses: Pulmonary embolism, bilateral ([address] [I26.99]  SOB (shortness of breath) [R06.02]  Pulmonary embolism ([address] [I26.99]    Discharge Diagnoses:   Patient Active Problem List    Diagnosis Date Noted    Suspected sleep apnea 04/07/2021    Pulmonary embolism, bilateral ([address] 04/06/2021    Pulmonary embolism ([address] 04/06/2021    History of GI bleed 04/06/2021    Exertional dyspnea 03/17/2021    GI bleed 02/15/2020    Diverticulosis 02/15/2020    PVCs (premature ventricular contractions) 12/19/2018    GERD (gastroesophageal reflux disease) 12/19/2018    Recurrent depression ([address] 12/20/2017    Anxiety     Essential hypertension with goal blood pressure less than 130/85 01/19/2017    Myocardiopathy ([address] 06/30/2016    Alcoholism /alcohol abuse        Cardiology Procedures this admission:  EchoCardiogram  Chest CT, bilateral doppler ultrasound  Consults: Pulmonary/Intensive care and IR    Hospital Course: Patient admitted for direct admission for worsening exertional shortness of breath. Patient had outpatient chest CT done that showed multiple bilateral PE. He was started on IV heparin for treatment. Echocardiogram was done and showed:  -  Left ventricle: Systolic function was at the lower limits of normal. Ejection fraction was estimated in the range of 50 % to 55 %. There were no regional wall motion abnormalities. Left ventricular diastolic function is Indeterminate with an average E/e' of 5.1.  -  Right ventricle: The size was normal. Systolic function was normal. Estimated peak pressure was in the range of 20-25 mmHg. There was no pulmonary  artery hypertension.  -  Left atrium: The atrium was mildly dilated.   -  Mitral valve: There was a mild prolapse. There was moderate regurgitation.  -  Tricuspid valve: There was trace regurgitation.     Lower extremity doppler was done that showed thrombosis of the mid left peroneal vein below the level of the knee. Pulmonary was asked to see patient in consultation with recommendations to consult IR for possible EKOs placement. IR saw patient with recommendation for continued anticoagulation as outpatient. The morning of discharge, patient was up feeling well without any complaints of chest pain or shortness of breath. Patient's labs were WNL. Patient was seen and examined by Dr. Ty Hill and determined stable and ready for discharge. Patient was instructed on the importance of medication compliance including taking Xarelto as prescribed everyday without missing a dose. The patient will follow up with [facility] Cardiology -- Dr. Ty Hill in 2-3 weeks. Patient has been referred to cardiac rehab. DISPOSITION: The patient is being discharged home in stable condition on a low saturated fat, low cholesterol and low salt diet. The patient is instructed to advance activities as tolerated to the limit of fatigue or shortness of breath. Discharge Exam: Patient has been seen by Dr. Ty Hill: see his progress note for exam details.     Visit Vitals  /77 (BP 1 Location: Right upper arm, BP Patient Position: At rest)   Pulse 69   Temp 97.9 °F (36.6 °C)   Resp 18   Ht 5' 8\" (1.727 m)   Wt 86.9 kg (191 lb 8 oz)   SpO2 93%   BMI 29.12 kg/m²       Recent Results (from the past 24 hour(s))   HEPARIN XA UFH    Collection Time: 04/07/21  3:15 PM   Result Value Ref Range    Heparin Xa UFH 0.58 0.3 - 0.7 IU/mL   HEPARIN XA UFH    Collection Time: 04/07/21  9:17 PM   Result Value Ref Range    Heparin Xa UFH 0.64 0.3 - 0.7 IU/mL   CBC WITH AUTOMATED DIFF    Collection Time: 04/08/21  3:30 AM   Result Value Ref Range    WBC 4.3 4.3 - 11.1 K/uL    RBC 4.49 4.23 - 5.6 M/uL    HGB 12.5 (L) 13.6 - 17.2 g/dL HCT 40.2 (L) 41.1 - 50.3 %    MCV 89.5 79.6 - 97.8 FL    MCH 27.8 26.1 - 32.9 PG    MCHC 31.1 (L) 31.4 - 35.0 g/dL    RDW 16.8 (H) 11.9 - 14.6 %    PLATELET 754 718 - 835 K/uL    MPV 9.3 (L) 9.4 - 12.3 FL    ABSOLUTE NRBC 0.00 0.0 - 0.2 K/uL    DF AUTOMATED      NEUTROPHILS 55 43 - 78 %    LYMPHOCYTES 31 13 - 44 %    MONOCYTES 8 4.0 - 12.0 %    EOSINOPHILS 4 0.5 - 7.8 %    BASOPHILS 1 0.0 - 2.0 %    IMMATURE GRANULOCYTES 0 0.0 - 5.0 %    ABS. NEUTROPHILS 2.4 1.7 - 8.2 K/UL    ABS. LYMPHOCYTES 1.4 0.5 - 4.6 K/UL    ABS. MONOCYTES 0.4 0.1 - 1.3 K/UL    ABS. EOSINOPHILS 0.2 0.0 - 0.8 K/UL    ABS. BASOPHILS 0.0 0.0 - 0.2 K/UL    ABS. IMM. GRANS. 0.0 0.0 - 0.5 K/UL   METABOLIC PANEL, BASIC    Collection Time: 04/08/21  3:30 AM   Result Value Ref Range    Sodium 141 138 - 145 mmol/L    Potassium 3.5 3.5 - 5.1 mmol/L    Chloride 110 (H) 98 - 107 mmol/L    CO2 26 21 - 32 mmol/L    Anion gap 5 (L) 7 - 16 mmol/L    Glucose 108 (H) 65 - 100 mg/dL    BUN 8 6 - 23 MG/DL    Creatinine 0.63 (L) 0.8 - 1.5 MG/DL    GFR est AA >60 >60 ml/min/1.73m2    GFR est non-AA >60 >60 ml/min/1.73m2    Calcium 8.7 8.3 - 10.4 MG/DL   MAGNESIUM    Collection Time: 04/08/21  3:30 AM   Result Value Ref Range    Magnesium 2.0 1.8 - 2.4 mg/dL   HEPARIN XA UFH    Collection Time: 04/08/21  3:30 AM   Result Value Ref Range    Heparin Xa UFH 0.61 0.3 - 0.7 IU/mL         Patient Instructions:   Current Discharge Medication List      START taking these medications    Details   !! rivaroxaban (XARELTO) 15 mg tab tablet Take 1 Tab by mouth two (2) times daily (with meals) for 21 days. Qty: 42 Tab, Refills: 0      !! rivaroxaban (Xarelto) 20 mg tab tablet Take 1 Tab by mouth daily (with dinner). Qty: 30 Tab, Refills: 11       !! - Potential duplicate medications found. Please discuss with provider. CONTINUE these medications which have NOT CHANGED    Details   B.infantis-B.ani-B.long-B.bifi (Probiotic 4X) 10-15 mg TbEC Take 1 Tab by mouth daily. cyanocobalamin 1,000 mcg tablet Take 1,000 mcg by mouth daily. ascorbic acid (VITAMIN C) 1,000 mg tablet Take 1,000 mg by mouth every morning.        multivitamin (ONE A DAY) tablet Take 1 Tab by mouth every morning. STOP taking these medications       acetaminophen (TylenoL) 325 mg tablet Comments:   Reason for Stopping:                 Signed:  Britt Donahue NP  4/8/2021  8:38 AM  ATTENDING ADDENDUM:    Patient seen and examined by me. Agree with above note by physician extender. Key findings are:  No CP or CHF or palpitations. euvolemic and no bleeding with stable Hgb on heparin gtt >24 hours. Convert to xarelto per DVT/PE protocol and follow up in the office soon. CV- RRR without murmur  Lungs- Clear bilaterally  Abd- soft, nontender, nondistended  Ext- no edema    Plan: As above. Call with worsening shortness of breath, any new gastrointestinal bleeding.     Koffi Basurto MD  [address] Cardiology  Pager [phone]

## 2021-04-08 NOTE — PROGRESS NOTES
Oxygen Qualifier       Room air: SpO2 with O2 and liter flow   Resting SpO2  97%     Ambulating SpO2  93%          Completed by:    Tahir Allen

## 2021-04-08 NOTE — DISCHARGE INSTRUCTIONS
\Patient Education        Pulmonary Embolism: Care Instructions  Overview     Pulmonary embolism is the sudden blockage of an artery in the lung. Blood clots in the deep veins of the leg or pelvis (deep vein thrombosis, or DVT) are the most common cause. These blood clots can travel to the lungs. Pulmonary embolism can be very serious. Because you have had one pulmonary embolism, you are at greater risk for having another one. But you can take steps to prevent another pulmonary embolism by following your doctor's instructions. You will probably take a prescription blood-thinning medicine to prevent blood clots. A blood thinner can stop a blood clot from growing larger and prevent new clots from forming. Follow-up care is a key part of your treatment and safety. Be sure to make and go to all appointments, and call your doctor if you are having problems. It's also a good idea to know your test results and keep a list of the medicines you take. How can you care for yourself at home? · Take your medicines exactly as prescribed. Call your doctor if you think you are having a problem with your medicine. You will get more details on the specific medicines your doctor prescribes. · If you are taking a blood thinner, be sure you get instructions about how to take your medicine safely. Blood thinners can cause serious bleeding problems. Preventing future pulmonary embolisms  · Exercise. Keep blood moving in your legs to keep clots from forming. If you are traveling by car, stop every hour or so. Get out and walk around for a few minutes. If you are traveling by bus, train, or plane, get out of your seat and walk up and down the aisles every hour or so. You also can do leg exercises while you are seated. Pump your feet up and down by pulling your toes up toward your knees then pointing them down. · Get up out of bed as soon as possible after an illness or surgery. · Do not smoke.  If you need help quitting, talk to your doctor about stop-smoking programs and medicines. These can increase your chances of quitting for good. · Check with your doctor before taking hormone or birth control pills. These may increase your risk of blood clots. · Ask your doctor about wearing compression stockings to help prevent blood clots in your legs. There are different types of stockings, and they need to fit right. So your doctor will recommend what you need. When should you call for help? Call 911 anytime you think you may need emergency care. For example, call if:    · You have shortness of breath.     · You have chest pain.     · You passed out (lost consciousness).     · You cough up blood. Call your doctor now or seek immediate medical care if:    · You have new or worsening pain or swelling in your leg. Watch closely for changes in your health, and be sure to contact your doctor if:    · You do not get better as expected. Where can you learn more? Go to http://www.gray.com/  Enter M434 in the search box to learn more about \"Pulmonary Embolism: Care Instructions. \"  Current as of: March 4, 2020               Content Version: 12.8  © 2006-2021 [employer]. Care instructions adapted under license by [employer] (which disclaims liability or warranty for this information). If you have questions about a medical condition or this instruction, always ask your healthcare professional. [place] [address] any warranty or liability for your use of this information. Patient Education      Rivaroxaban (Xarelto, Xarelto Starter Pack) - (By mouth)   Why this medicine is used:   Treats and prevents blood clots.   Contact a nurse or doctor right away if you have:  · Sudden or severe headache  · Back pain, numbness, tingling, weakness in your legs or feet  · Loss of bladder or bowel control  · Bloody vomit or vomit that looks like coffee grounds; bloody or black, tarry stools  · Bleeding that does not stop or bruises that do not heal     Common side effects:  · Minor bleeding or bruising  © 2017 [facility] Information is for End User's use only and may not be sold, redistributed or otherwise used for commercial purposes.

## 2021-04-08 NOTE — PROGRESS NOTES
Discharge instructions reviewed with Mariana Aguilar. Prescriptions given for xarelto and med info sheets provided for all new medications. Opportunity for questions provided. Patient voiced understanding of all discharge instructions.      IV and heart monitor removed

## 2021-04-08 NOTE — PROGRESS NOTES
Ruben Brookejuan  Admission Date: 4/6/2021             Daily Progress Note: 4/8/2021    The patient's chart is reviewed and the patient is discussed with the staff. Patient is a 62 y. o. male with a history of HTN, depression, GERD, GI bleed. He was diagnosed with Covid in December. This was a relatively mild case and was managed at home. His shortness of breath did not really start until about a month ago. He describes it as relatively sudden and relatively constant since that time. He was sent for a CT PE protocol which showed sizable bilateral PE's.     No family history of clotting disorders. He is not on any hormone therapy and is not currently a smoker. No known cancers and up to date with colonoscopy and PSA screening. Had diverticular bleeds in Feb and Sept of 2020 both requiring hospitalizations.    Doppler showed DVT LL peroneal vein. Subjective:     Planning to go home. Questions answered regarding medication, follow up, oxygen need. Wife at bedside.      Current Facility-Administered Medications   Medication Dose Route Frequency    rivaroxaban (XARELTO) tablet 15 mg  15 mg Oral BID WITH MEALS    psyllium husk-aspartame (METAMUCIL FIBER) packet 1 Packet  1 Packet Oral DAILY PRN    senna-docusate (PERICOLACE) 8.6-50 mg per tablet 1 Tab  1 Tab Oral DAILY PRN    acetaminophen (TYLENOL) tablet 650 mg  650 mg Oral Q4H PRN    sodium chloride (NS) flush 5-40 mL  5-40 mL IntraVENous Q8H    sodium chloride (NS) flush 5-40 mL  5-40 mL IntraVENous PRN    nitroglycerin (NITROSTAT) tablet 0.4 mg  0.4 mg SubLINGual Q5MIN PRN    morphine injection 2 mg  2 mg IntraVENous Q4H PRN    docusate sodium (COLACE) capsule 100 mg  100 mg Oral BID         Objective:     Vitals:    04/07/21 2034 04/08/21 0225 04/08/21 0453 04/08/21 0846   BP: (!) 140/80 110/68 129/77 120/86   Pulse: 69 82 69 96   Resp: 19 18 18 18   Temp: 98 °F (36.7 °C) 97.7 °F (36.5 °C) 97.9 °F (36.6 °C) 97.3 °F (36.3 °C) SpO2: 95% 94% 93% 96%   Weight:   191 lb 8 oz (86.9 kg)    Height:         Intake and Output:   04/06 1901 - 04/08 0700  In: 960 [P.O.:960]  Out: 2600 [Urine:2600]  04/08 0701 - 04/08 1900  In: 360 [P.O.:360]  Out: -     Physical Exam:   Constitutional:  the patient is well developed and in no acute distress  HEENT:  Sclera clear, pupils equal, oral mucosa moist  Lungs: clear bilaterally. On room air  Cardiovascular:  RRR without M,G,R  Abd/GI: soft and non-tender; with positive bowel sounds. Ext: warm without cyanosis. There is no lower leg edema. Musculoskeletal: moves all four extremities with equal strength  Skin:  no jaundice or rashes, no wounds   Neuro: no gross neuro deficits   Musculoskeletal: can ambulate. No deformity  Psychiatric: Calm. Review of Systems - General ROS: positive for  - none  Respiratory ROS: positive for - shortness of breath  Cardiovascular ROS: no chest pain   Gastrointestinal ROS: no abdominal pain, change in bowel habits, or black or bloody stools  Genito-Urinary ROS: no dysuria, trouble voiding, or hematuria   Lines: peripheral IV    CHEST XRAY:  None today    LAB  Recent Labs     04/08/21  0330 04/07/21  0717 04/06/21  1617   WBC 4.3 3.9* 3.9*   HGB 12.5* 12.7* 12.7*   HCT 40.2* 40.2* 40.9*    191 186     Recent Labs     04/08/21  0330 04/07/21  0717    140   K 3.5 3.9   * 109*   CO2 26 27   * 113*   BUN 8 11   CREA 0.63* 0.76*   MG 2.0  --    ALB  --  3.3*       Assessment:  (Medical Decision Making)     Hospital Problems  Date Reviewed: 3/31/2021          Codes Class Noted POA    Left leg DVT ([facility] ICD-10-CM: I82.402  ICD-9-CM: 453.40  4/8/2021 Yes    peroneal     Nocturnal hypoxia ICD-10-CM: G47.34  ICD-9-CM: 327.24  4/8/2021 Yes    Lowest sat 84% on overnight study    Suspected sleep apnea ICD-10-CM: R29.818  ICD-9-CM: 781.99  4/7/2021 Yes    Desaturations on overnight study - ?  Secondary to PE or PATRICK    Pulmonary embolism, bilateral ([address] ICD-10-CM: I26.99  ICD-9-CM: 415.19  4/6/2021 Yes    Associated with left leg DVT - ? Secondary to recent COVID    History of GI bleed (Chronic) ICD-10-CM: Z87.19  ICD-9-CM: V12.79  4/6/2021 Yes    Secondary to diverticular disease    * (Principal) Exertional dyspnea ICD-10-CM: R06.00  ICD-9-CM: 786.09  3/17/2021 Yes    Secondary to PD    Diverticulosis (Chronic) ICD-10-CM: K57.90  ICD-9-CM: 562.10  2/15/2020 Yes    As above    GERD (gastroesophageal reflux disease) (Chronic) ICD-10-CM: K21.9  ICD-9-CM: 530.81  12/19/2018 Yes              Plan:  (Medical Decision Making)   1. Now on Xarelto - explained dosing  2. ? Need to check d dimer. Plan to follow up in the office and usually stop anticoagulation after 3 months but not sure if this is secondary to COVID if 3 months is sufficient. 3. Needs oxygen with sleep - 2 liters. Lowest sat 84% on overnight study. Will reassess need for after PE issue resolved. Sleep evaluation as outpatient later    Roman Norris NP   Lungs: clear  Heart:  RRR with no Murmur/Rubs/Gallops    Additional Comments:  Probably needs 6 months anticoagulation    I have spoken with and examined the patient. I agree with the above assessment and plan as documented. Desirae Louie MD      More than 50% of time documented was spent face-to-face contact with the patient and in the care of the patient on the floor/unit where the patient is located.

## 2021-04-08 NOTE — ROUTINE PROCESS
Bedside and Verbal shift change report given to Sharlene Griffith  (oncoming nurse) by self Robb Sat nurse). Report included the following information SBAR, Kardex, Intake/Output, MAR, Recent Results and Cardiac Rhythm SR.         Hep gtt stopped

## 2021-04-08 NOTE — PROGRESS NOTES
Care Management Interventions  PCP Verified by CM: Yes(Leobardo Ham MD)  Mode of Transport at Discharge: Other (see comment)(Family)  Transition of Care Consult (CM Consult): Discharge Planning  Physical Therapy Consult: No  Occupational Therapy Consult: No  Speech Therapy Consult: No  Current Support Network: Lives with Spouse, Own Home, Family Lives Nearby  Confirm Follow Up Transport: Family  The Patient and/or Patient Representative was Provided with a Choice of Provider and Agrees with the Discharge Plan?: Yes  Name of the Patient Representative Who was Provided with a Choice of Provider and Agrees with the Discharge Plan: Raghavendra Zaidi \"Hieu\" Priya Doyle of Choice List was Provided with Basic Dialogue that Supports the Patient's Individualized Plan of Care/Goals, Treatment Preferences and Shares the Quality Data Associated with the Providers?: Yes  [employer] Information Provided?: No  Discharge Location  Discharge Placement: Home    CM met with pt and his spouse, Roxann Leos, this AM to discuss DCP. Pt had both RT eval for 6 minute ambulation and overnight oximetry ordered from Pulmonary consult. Pt did not qualify for ambulation O2. Overnight oximetry has desats at 84%. CM contacted Jonas Hinton NP for pulmonary for order for night O2 and flow rate. Pt's spouse states pt would benefit from humidity for home O2. Pt starting Xarelto prescription. Pt's primary nurse, Derrek Jackson, states she has offered pt discount coupon for prescription. Pt has private insurance with [address] 1450 CM completed night home O2 order with Pulm signature completed for 2L NC at night. CM faxed all required documents to [facility] and f/u fax with a phone call to [facility] documents received. Verbal order for humidity made to [facility]. CM met pt prior to discharge to verify with him that [facility] would be contacting him for O2 setup. He is agreeable. No other CM needs identified.  Pt transported home by his spouse.

## 2021-04-08 NOTE — ROUTINE PROCESS
Bedside and Verbal shift change report given to self (oncoming nurse) by Alonzo Jimenez (offgoing nurse). Report included the following information SBAR, Kardex, MAR and Recent Results.

## 2021-04-19 ENCOUNTER — HOSPITAL ENCOUNTER (OUTPATIENT)
Dept: LAB | Age: 56
Discharge: HOME | End: 2021-04-19
Payer: COMMERCIAL

## 2021-04-19 DIAGNOSIS — K57.91 GASTROINTESTINAL HEMORRHAGE ASSOCIATED WITH INTESTINAL DIVERTICULOSIS: ICD-10-CM

## 2021-04-19 LAB
BASOPHILS # BLD: 0 K/UL (ref 0–0.2)
BASOPHILS NFR BLD: 1 % (ref 0–2)
DIFFERENTIAL METHOD BLD: ABNORMAL
EOSINOPHIL # BLD: 0.2 K/UL (ref 0–0.8)
EOSINOPHIL NFR BLD: 5 % (ref 0.5–7.8)
ERYTHROCYTE [DISTWIDTH] IN BLOOD BY AUTOMATED COUNT: 16.1 % (ref 11.9–14.6)
HCT VFR BLD AUTO: 41.3 % (ref 41.1–50.3)
HGB BLD-MCNC: 13 G/DL (ref 13.6–17.2)
IMM GRANULOCYTES # BLD AUTO: 0 K/UL (ref 0–0.5)
IMM GRANULOCYTES NFR BLD AUTO: 0 % (ref 0–5)
LYMPHOCYTES # BLD: 1.6 K/UL (ref 0.5–4.6)
LYMPHOCYTES NFR BLD: 42 % (ref 13–44)
MCH RBC QN AUTO: 28.3 PG (ref 26.1–32.9)
MCHC RBC AUTO-ENTMCNC: 31.5 G/DL (ref 31.4–35)
MCV RBC AUTO: 89.8 FL (ref 79.6–97.8)
MONOCYTES # BLD: 0.4 K/UL (ref 0.1–1.3)
MONOCYTES NFR BLD: 11 % (ref 4–12)
NEUTS SEG # BLD: 1.6 K/UL (ref 1.7–8.2)
NEUTS SEG NFR BLD: 42 % (ref 43–78)
NRBC # BLD: 0 K/UL (ref 0–0.2)
PLATELET # BLD AUTO: 253 K/UL (ref 150–450)
PMV BLD AUTO: 10 FL (ref 9.4–12.3)
RBC # BLD AUTO: 4.6 M/UL (ref 4.23–5.6)
WBC # BLD AUTO: 3.8 K/UL (ref 4.3–11.1)

## 2021-04-19 PROCEDURE — 36415 COLL VENOUS BLD VENIPUNCTURE: CPT

## 2021-04-19 PROCEDURE — 85025 COMPLETE CBC W/AUTO DIFF WBC: CPT

## 2021-04-26 PROBLEM — I82.542 CHRONIC DEEP VEIN THROMBOSIS (DVT) OF TIBIAL VEIN OF LEFT LOWER EXTREMITY (HCC): Status: ACTIVE | Noted: 2021-04-08

## 2021-07-28 ENCOUNTER — HOSPITAL ENCOUNTER (OUTPATIENT)
Dept: SLEEP MEDICINE | Age: 56
Discharge: HOME | End: 2021-07-28
Payer: COMMERCIAL

## 2021-07-28 PROCEDURE — 95806 SLEEP STUDY UNATT&RESP EFFT: CPT

## 2021-08-20 PROBLEM — G47.33 OSA (OBSTRUCTIVE SLEEP APNEA): Status: ACTIVE | Noted: 2021-08-20

## 2021-11-16 NOTE — PROGRESS NOTES
Problem: Falls - Risk of  Goal: *Absence of Falls  Description  Document Trena Dong Fall Risk and appropriate interventions in the flowsheet.   Outcome: Progressing Towards Goal  Note: Fall Risk Interventions:  Mobility Interventions: Strengthening exercises (ROM-active/passive)         Medication Interventions: Patient to call before getting OOB    Elimination Interventions: Call light in reach    History of Falls Interventions: Room close to nurse's station Detail Level: Simple BMI 36.4, pt efforts to lose weight

## 2022-01-01 NOTE — PROGRESS NOTES
CM spoke with patient, to discuss discharge needs. Patient voiced no needs at this time. Patient to discharge home this day. Family will transport. Please consult or notify CM if any needs shall arise. CM remains available. Care Management Interventions  Mode of Transport at Discharge:  Other (see comment)(Family )  Transition of Care Consult (CM Consult): Discharge Planning  Discharge Durable Medical Equipment: No  Physical Therapy Consult: No  Occupational Therapy Consult: No  Speech Therapy Consult: No  Current Support Network: Own Home  Confirm Follow Up Transport: Self   Resource Information Provided?: No  Discharge Location  Discharge Placement: Home Yes

## 2022-03-18 PROBLEM — I82.542 CHRONIC DEEP VEIN THROMBOSIS (DVT) OF TIBIAL VEIN OF LEFT LOWER EXTREMITY (HCC): Status: ACTIVE | Noted: 2021-04-08

## 2022-03-18 PROBLEM — Z87.19 HISTORY OF GI BLEED: Status: ACTIVE | Noted: 2021-04-06

## 2022-03-18 PROBLEM — I26.99 PULMONARY EMBOLISM, BILATERAL (HCC): Status: ACTIVE | Noted: 2021-04-06

## 2022-03-19 PROBLEM — K57.90 DIVERTICULOSIS: Status: ACTIVE | Noted: 2020-02-15

## 2022-03-19 PROBLEM — K21.9 GERD (GASTROESOPHAGEAL REFLUX DISEASE): Status: ACTIVE | Noted: 2018-12-19

## 2022-03-19 PROBLEM — I10 ESSENTIAL HYPERTENSION WITH GOAL BLOOD PRESSURE LESS THAN 130/85: Status: ACTIVE | Noted: 2017-01-19

## 2022-03-19 PROBLEM — G47.33 OSA (OBSTRUCTIVE SLEEP APNEA): Status: ACTIVE | Noted: 2021-08-20

## 2022-03-19 PROBLEM — F33.9 RECURRENT DEPRESSION (HCC): Status: ACTIVE | Noted: 2017-12-20

## 2022-03-19 PROBLEM — G47.34 NOCTURNAL HYPOXIA: Status: ACTIVE | Noted: 2021-04-08

## 2022-03-19 PROBLEM — I49.3 PVCS (PREMATURE VENTRICULAR CONTRACTIONS): Status: ACTIVE | Noted: 2018-12-19

## 2022-03-20 PROBLEM — R06.09 EXERTIONAL DYSPNEA: Status: ACTIVE | Noted: 2021-03-17

## 2022-03-20 PROBLEM — R29.818 SUSPECTED SLEEP APNEA: Status: ACTIVE | Noted: 2021-04-07

## 2022-06-03 ENCOUNTER — APPOINTMENT (OUTPATIENT)
Dept: CT IMAGING | Age: 57
DRG: 392 | End: 2022-06-03
Payer: COMMERCIAL

## 2022-06-03 ENCOUNTER — HOSPITAL ENCOUNTER (EMERGENCY)
Age: 57
Discharge: OTHER INSTITUTION | DRG: 392 | End: 2022-06-03
Attending: EMERGENCY MEDICINE
Payer: COMMERCIAL

## 2022-06-03 ENCOUNTER — HOSPITAL ENCOUNTER (INPATIENT)
Age: 57
LOS: 4 days | Discharge: HOME | DRG: 392 | End: 2022-06-07
Attending: FAMILY MEDICINE | Admitting: HOSPITALIST
Payer: COMMERCIAL

## 2022-06-03 VITALS
RESPIRATION RATE: 16 BRPM | TEMPERATURE: 98.9 F | DIASTOLIC BLOOD PRESSURE: 65 MMHG | OXYGEN SATURATION: 92 % | SYSTOLIC BLOOD PRESSURE: 106 MMHG | HEART RATE: 79 BPM | BODY MASS INDEX: 28.79 KG/M2 | WEIGHT: 190 LBS | HEIGHT: 68 IN

## 2022-06-03 DIAGNOSIS — K57.80 DIVERTICULITIS OF INTESTINE WITH PERFORATION WITHOUT BLEEDING, UNSPECIFIED PART OF INTESTINAL TRACT: Primary | ICD-10-CM

## 2022-06-03 PROBLEM — K57.20 DIVERTICULITIS OF COLON WITH PERFORATION: Status: ACTIVE | Noted: 2022-06-03

## 2022-06-03 LAB
ALBUMIN SERPL-MCNC: 3.9 G/DL (ref 3.5–5)
ALBUMIN/GLOB SERPL: 1.3 {RATIO}
ALP SERPL-CCNC: 47 U/L (ref 45–117)
ALT SERPL-CCNC: 19 U/L (ref 13–61)
ANION GAP SERPL CALC-SCNC: 13 MMOL/L (ref 7–16)
APPEARANCE UR: CLEAR
AST SERPL-CCNC: 17 U/L (ref 15–37)
BACTERIA URNS QL MICRO: ABNORMAL /HPF
BASOPHILS # BLD: 0 K/UL (ref 0–0.2)
BASOPHILS NFR BLD: 0 % (ref 0–2)
BILIRUB SERPL-MCNC: 0.7 MG/DL (ref 0.2–1.1)
BILIRUB UR QL: NEGATIVE
BUN SERPL-MCNC: 9 MG/DL (ref 7–18)
CALCIUM SERPL-MCNC: 9.1 MG/DL (ref 8.3–10.4)
CASTS URNS QL MICRO: 0 /LPF
CHLORIDE SERPL-SCNC: 103 MMOL/L (ref 98–107)
CO2 SERPL-SCNC: 22 MMOL/L (ref 21–32)
COLOR UR: YELLOW
CREAT SERPL-MCNC: 0.69 MG/DL (ref 0.8–1.5)
CRYSTALS URNS QL MICRO: 0 /LPF
DIFFERENTIAL METHOD BLD: ABNORMAL
EOSINOPHIL # BLD: 0 K/UL (ref 0–0.8)
EOSINOPHIL NFR BLD: 0 % (ref 0.5–7.8)
EPI CELLS #/AREA URNS HPF: 0 /HPF
ERYTHROCYTE [DISTWIDTH] IN BLOOD BY AUTOMATED COUNT: 12.4 % (ref 11.9–14.6)
GLOBULIN SER CALC-MCNC: 2.9 G/DL (ref 2.3–3.5)
GLUCOSE SERPL-MCNC: 105 MG/DL (ref 65–100)
GLUCOSE UR STRIP.AUTO-MCNC: NEGATIVE MG/DL
HCT VFR BLD AUTO: 43.4 % (ref 41.1–50.3)
HGB BLD-MCNC: 14.8 G/DL (ref 13.6–17.2)
HGB UR QL STRIP: ABNORMAL
IMM GRANULOCYTES # BLD AUTO: 0 K/UL (ref 0–0.5)
IMM GRANULOCYTES NFR BLD AUTO: 0 % (ref 0–5)
KETONES UR QL STRIP.AUTO: 40 MG/DL
LEUKOCYTE ESTERASE UR QL STRIP.AUTO: NEGATIVE
LIPASE SERPL-CCNC: 18 U/L (ref 13–60)
LYMPHOCYTES # BLD: 1 K/UL (ref 0.5–4.6)
LYMPHOCYTES NFR BLD: 13 % (ref 13–44)
MCH RBC QN AUTO: 32.7 PG (ref 26.1–32.9)
MCHC RBC AUTO-ENTMCNC: 34.1 G/DL (ref 31.4–35)
MCV RBC AUTO: 95.8 FL (ref 79.6–97.8)
MONOCYTES # BLD: 0.6 K/UL (ref 0.1–1.3)
MONOCYTES NFR BLD: 7 % (ref 4–12)
MUCOUS THREADS URNS QL MICRO: ABNORMAL /LPF
NEUTS SEG # BLD: 6.3 K/UL (ref 1.7–8.2)
NEUTS SEG NFR BLD: 80 % (ref 43–78)
NITRITE UR QL STRIP.AUTO: NEGATIVE
NRBC # BLD: 0 K/UL (ref 0–0.2)
OTHER OBSERVATIONS: ABNORMAL
PH UR STRIP: 6 [PH] (ref 5–9)
PLATELET # BLD AUTO: 185 K/UL (ref 150–450)
PMV BLD AUTO: 8.9 FL (ref 9.4–12.3)
POTASSIUM SERPL-SCNC: 3.6 MMOL/L (ref 3.5–5.1)
PROT SERPL-MCNC: 6.8 G/DL (ref 6.4–8.2)
PROT UR STRIP-MCNC: NEGATIVE MG/DL
RBC # BLD AUTO: 4.53 M/UL (ref 4.23–5.6)
RBC #/AREA URNS HPF: ABNORMAL /HPF
SODIUM SERPL-SCNC: 138 MMOL/L (ref 136–145)
SP GR UR REFRACTOMETRY: 1.01 (ref 1–1.02)
UROBILINOGEN UR QL STRIP.AUTO: 0.2 EU/DL (ref 0.2–1)
WBC # BLD AUTO: 8 K/UL (ref 4.3–11.1)
WBC URNS QL MICRO: ABNORMAL /HPF

## 2022-06-03 PROCEDURE — 96376 TX/PRO/DX INJ SAME DRUG ADON: CPT

## 2022-06-03 PROCEDURE — 81003 URINALYSIS AUTO W/O SCOPE: CPT

## 2022-06-03 PROCEDURE — 74177 CT ABD & PELVIS W/CONTRAST: CPT

## 2022-06-03 PROCEDURE — 6360000004 HC RX CONTRAST MEDICATION: Performed by: EMERGENCY MEDICINE

## 2022-06-03 PROCEDURE — 96375 TX/PRO/DX INJ NEW DRUG ADDON: CPT

## 2022-06-03 PROCEDURE — 80053 COMPREHEN METABOLIC PANEL: CPT

## 2022-06-03 PROCEDURE — 6370000000 HC RX 637 (ALT 250 FOR IP): Performed by: EMERGENCY MEDICINE

## 2022-06-03 PROCEDURE — 83690 ASSAY OF LIPASE: CPT

## 2022-06-03 PROCEDURE — 99285 EMERGENCY DEPT VISIT HI MDM: CPT

## 2022-06-03 PROCEDURE — 81015 MICROSCOPIC EXAM OF URINE: CPT

## 2022-06-03 PROCEDURE — 6360000002 HC RX W HCPCS: Performed by: EMERGENCY MEDICINE

## 2022-06-03 PROCEDURE — 1100000000 HC RM PRIVATE

## 2022-06-03 PROCEDURE — 85025 COMPLETE CBC W/AUTO DIFF WBC: CPT

## 2022-06-03 PROCEDURE — 2580000003 HC RX 258: Performed by: EMERGENCY MEDICINE

## 2022-06-03 PROCEDURE — 6360000002 HC RX W HCPCS

## 2022-06-03 PROCEDURE — 96374 THER/PROPH/DIAG INJ IV PUSH: CPT

## 2022-06-03 RX ORDER — SODIUM CHLORIDE 0.9 % (FLUSH) 0.9 %
5-40 SYRINGE (ML) INJECTION EVERY 12 HOURS SCHEDULED
Status: DISCONTINUED | OUTPATIENT
Start: 2022-06-04 | End: 2022-06-07 | Stop reason: HOSPADM

## 2022-06-03 RX ORDER — SODIUM CHLORIDE 9 MG/ML
INJECTION, SOLUTION INTRAVENOUS PRN
Status: DISCONTINUED | OUTPATIENT
Start: 2022-06-03 | End: 2022-06-07 | Stop reason: HOSPADM

## 2022-06-03 RX ORDER — SODIUM CHLORIDE 0.9 % (FLUSH) 0.9 %
5-40 SYRINGE (ML) INJECTION PRN
Status: DISCONTINUED | OUTPATIENT
Start: 2022-06-03 | End: 2022-06-07 | Stop reason: HOSPADM

## 2022-06-03 RX ORDER — SODIUM CHLORIDE 0.9 % (FLUSH) 0.9 %
3 SYRINGE (ML) INJECTION EVERY 8 HOURS
Status: DISCONTINUED | OUTPATIENT
Start: 2022-06-03 | End: 2022-06-03 | Stop reason: HOSPADM

## 2022-06-03 RX ORDER — ACETAMINOPHEN 500 MG
1000 TABLET ORAL
Status: COMPLETED | OUTPATIENT
Start: 2022-06-03 | End: 2022-06-03

## 2022-06-03 RX ORDER — MORPHINE SULFATE 4 MG/ML
4 INJECTION INTRAVENOUS
Status: COMPLETED | OUTPATIENT
Start: 2022-06-03 | End: 2022-06-03

## 2022-06-03 RX ORDER — ACETAMINOPHEN 650 MG/1
650 SUPPOSITORY RECTAL EVERY 6 HOURS PRN
Status: DISCONTINUED | OUTPATIENT
Start: 2022-06-03 | End: 2022-06-07 | Stop reason: HOSPADM

## 2022-06-03 RX ORDER — ACETAMINOPHEN 325 MG/1
650 TABLET ORAL EVERY 6 HOURS PRN
Status: DISCONTINUED | OUTPATIENT
Start: 2022-06-03 | End: 2022-06-07 | Stop reason: HOSPADM

## 2022-06-03 RX ORDER — DEXTROSE AND SODIUM CHLORIDE 5; .45 G/100ML; G/100ML
INJECTION, SOLUTION INTRAVENOUS CONTINUOUS
Status: DISCONTINUED | OUTPATIENT
Start: 2022-06-04 | End: 2022-06-07

## 2022-06-03 RX ORDER — MORPHINE SULFATE 2 MG/ML
2 INJECTION, SOLUTION INTRAMUSCULAR; INTRAVENOUS EVERY 4 HOURS PRN
Status: DISCONTINUED | OUTPATIENT
Start: 2022-06-03 | End: 2022-06-07 | Stop reason: HOSPADM

## 2022-06-03 RX ORDER — ONDANSETRON 2 MG/ML
4 INJECTION INTRAMUSCULAR; INTRAVENOUS
Status: COMPLETED | OUTPATIENT
Start: 2022-06-03 | End: 2022-06-03

## 2022-06-03 RX ORDER — METRONIDAZOLE 500 MG/100ML
500 INJECTION, SOLUTION INTRAVENOUS
Status: DISCONTINUED | OUTPATIENT
Start: 2022-06-03 | End: 2022-06-03 | Stop reason: HOSPADM

## 2022-06-03 RX ORDER — 0.9 % SODIUM CHLORIDE 0.9 %
1000 INTRAVENOUS SOLUTION INTRAVENOUS ONCE
Status: COMPLETED | OUTPATIENT
Start: 2022-06-03 | End: 2022-06-03

## 2022-06-03 RX ORDER — ONDANSETRON 4 MG/1
4 TABLET, ORALLY DISINTEGRATING ORAL EVERY 8 HOURS PRN
Status: DISCONTINUED | OUTPATIENT
Start: 2022-06-03 | End: 2022-06-07 | Stop reason: HOSPADM

## 2022-06-03 RX ORDER — POLYETHYLENE GLYCOL 3350 17 G/17G
17 POWDER, FOR SOLUTION ORAL DAILY PRN
Status: DISCONTINUED | OUTPATIENT
Start: 2022-06-03 | End: 2022-06-05

## 2022-06-03 RX ORDER — ONDANSETRON 2 MG/ML
4 INJECTION INTRAMUSCULAR; INTRAVENOUS EVERY 6 HOURS PRN
Status: DISCONTINUED | OUTPATIENT
Start: 2022-06-03 | End: 2022-06-07 | Stop reason: HOSPADM

## 2022-06-03 RX ORDER — METRONIDAZOLE 500 MG/100ML
500 INJECTION, SOLUTION INTRAVENOUS EVERY 12 HOURS
Status: DISCONTINUED | OUTPATIENT
Start: 2022-06-04 | End: 2022-06-07 | Stop reason: HOSPADM

## 2022-06-03 RX ORDER — MAGNESIUM HYDROXIDE/ALUMINUM HYDROXICE/SIMETHICONE 120; 1200; 1200 MG/30ML; MG/30ML; MG/30ML
30 SUSPENSION ORAL EVERY 6 HOURS PRN
Status: DISCONTINUED | OUTPATIENT
Start: 2022-06-03 | End: 2022-06-07 | Stop reason: HOSPADM

## 2022-06-03 RX ADMIN — ONDANSETRON 4 MG: 2 INJECTION INTRAMUSCULAR; INTRAVENOUS at 19:16

## 2022-06-03 RX ADMIN — DIATRIZOATE MEGLUMINE AND DIATRIZOATE SODIUM 15 ML: 660; 100 LIQUID ORAL; RECTAL at 19:25

## 2022-06-03 RX ADMIN — SODIUM CHLORIDE, PRESERVATIVE FREE 3 ML: 5 INJECTION INTRAVENOUS at 18:01

## 2022-06-03 RX ADMIN — IOPAMIDOL 100 ML: 755 INJECTION, SOLUTION INTRAVENOUS at 20:46

## 2022-06-03 RX ADMIN — MORPHINE SULFATE 4 MG: 4 INJECTION INTRAVENOUS at 22:39

## 2022-06-03 RX ADMIN — SODIUM CHLORIDE 1000 ML: 9 INJECTION, SOLUTION INTRAVENOUS at 19:22

## 2022-06-03 RX ADMIN — MORPHINE SULFATE 4 MG: 4 INJECTION INTRAVENOUS at 19:16

## 2022-06-03 RX ADMIN — ACETAMINOPHEN 1000 MG: 500 TABLET, FILM COATED ORAL at 19:15

## 2022-06-03 ASSESSMENT — PAIN SCALES - GENERAL
PAINLEVEL_OUTOF10: 5
PAINLEVEL_OUTOF10: 2
PAINLEVEL_OUTOF10: 5
PAINLEVEL_OUTOF10: 8
PAINLEVEL_OUTOF10: 8
PAINLEVEL_OUTOF10: 2
PAINLEVEL_OUTOF10: 6
PAINLEVEL_OUTOF10: 8

## 2022-06-03 ASSESSMENT — ENCOUNTER SYMPTOMS
RESPIRATORY NEGATIVE: 1
VOMITING: 0
ABDOMINAL PAIN: 0
NAUSEA: 1
RECTAL PAIN: 1
BLOOD IN STOOL: 0
DIARRHEA: 0

## 2022-06-03 ASSESSMENT — PAIN - FUNCTIONAL ASSESSMENT
PAIN_FUNCTIONAL_ASSESSMENT: 0-10
PAIN_FUNCTIONAL_ASSESSMENT: ACTIVITIES ARE NOT PREVENTED

## 2022-06-03 ASSESSMENT — PAIN DESCRIPTION - ONSET: ONSET: ON-GOING

## 2022-06-03 ASSESSMENT — PAIN DESCRIPTION - PAIN TYPE: TYPE: ACUTE PAIN

## 2022-06-03 ASSESSMENT — PAIN DESCRIPTION - LOCATION
LOCATION: RECTUM
LOCATION: ABDOMEN

## 2022-06-03 ASSESSMENT — PAIN DESCRIPTION - DESCRIPTORS: DESCRIPTORS: ACHING

## 2022-06-03 ASSESSMENT — PAIN DESCRIPTION - FREQUENCY: FREQUENCY: CONTINUOUS

## 2022-06-03 ASSESSMENT — PAIN DESCRIPTION - ORIENTATION: ORIENTATION: MID;LOWER

## 2022-06-03 NOTE — ED TRIAGE NOTES
Pt states he has had rectal pain for a bout a week, has hx of diverticulitis. Denies NVD, has had lethargy. Denies known fevers. States he feels like he needs to have BM, last BM was yesterday.

## 2022-06-04 LAB
ANION GAP SERPL CALC-SCNC: 6 MMOL/L (ref 7–16)
BASOPHILS # BLD: 0 K/UL (ref 0–0.2)
BASOPHILS NFR BLD: 0 % (ref 0–2)
BUN SERPL-MCNC: 9 MG/DL (ref 6–23)
CALCIUM SERPL-MCNC: 8.5 MG/DL (ref 8.3–10.4)
CHLORIDE SERPL-SCNC: 107 MMOL/L (ref 98–107)
CO2 SERPL-SCNC: 25 MMOL/L (ref 21–32)
CREAT SERPL-MCNC: 0.7 MG/DL (ref 0.8–1.5)
DIFFERENTIAL METHOD BLD: ABNORMAL
EOSINOPHIL # BLD: 0 K/UL (ref 0–0.8)
EOSINOPHIL NFR BLD: 0 % (ref 0.5–7.8)
ERYTHROCYTE [DISTWIDTH] IN BLOOD BY AUTOMATED COUNT: 12.8 % (ref 11.9–14.6)
GLUCOSE SERPL-MCNC: 118 MG/DL (ref 65–100)
HCT VFR BLD AUTO: 40.6 % (ref 41.1–50.3)
HGB BLD-MCNC: 13.5 G/DL (ref 13.6–17.2)
IMM GRANULOCYTES # BLD AUTO: 0 K/UL (ref 0–0.5)
IMM GRANULOCYTES NFR BLD AUTO: 1 % (ref 0–5)
LYMPHOCYTES # BLD: 0.6 K/UL (ref 0.5–4.6)
LYMPHOCYTES NFR BLD: 7 % (ref 13–44)
MAGNESIUM SERPL-MCNC: 2.1 MG/DL (ref 1.8–2.4)
MCH RBC QN AUTO: 32.1 PG (ref 26.1–32.9)
MCHC RBC AUTO-ENTMCNC: 33.3 G/DL (ref 31.4–35)
MCV RBC AUTO: 96.4 FL (ref 79.6–97.8)
MONOCYTES # BLD: 0.4 K/UL (ref 0.1–1.3)
MONOCYTES NFR BLD: 4 % (ref 4–12)
NEUTS SEG # BLD: 7.6 K/UL (ref 1.7–8.2)
NEUTS SEG NFR BLD: 88 % (ref 43–78)
NRBC # BLD: 0 K/UL (ref 0–0.2)
PLATELET # BLD AUTO: 171 K/UL (ref 150–450)
PMV BLD AUTO: 9.6 FL (ref 9.4–12.3)
POTASSIUM SERPL-SCNC: 3.5 MMOL/L (ref 3.5–5.1)
RBC # BLD AUTO: 4.21 M/UL (ref 4.23–5.6)
SODIUM SERPL-SCNC: 138 MMOL/L (ref 136–145)
WBC # BLD AUTO: 8.7 K/UL (ref 4.3–11.1)

## 2022-06-04 PROCEDURE — 2580000003 HC RX 258: Performed by: HOSPITALIST

## 2022-06-04 PROCEDURE — 83735 ASSAY OF MAGNESIUM: CPT

## 2022-06-04 PROCEDURE — 1100000000 HC RM PRIVATE

## 2022-06-04 PROCEDURE — 6360000002 HC RX W HCPCS: Performed by: FAMILY MEDICINE

## 2022-06-04 PROCEDURE — 2500000003 HC RX 250 WO HCPCS: Performed by: HOSPITALIST

## 2022-06-04 PROCEDURE — 6370000000 HC RX 637 (ALT 250 FOR IP): Performed by: HOSPITALIST

## 2022-06-04 PROCEDURE — 36415 COLL VENOUS BLD VENIPUNCTURE: CPT

## 2022-06-04 PROCEDURE — 85025 COMPLETE CBC W/AUTO DIFF WBC: CPT

## 2022-06-04 PROCEDURE — 6370000000 HC RX 637 (ALT 250 FOR IP): Performed by: FAMILY MEDICINE

## 2022-06-04 PROCEDURE — 6360000002 HC RX W HCPCS: Performed by: HOSPITALIST

## 2022-06-04 PROCEDURE — 80048 BASIC METABOLIC PNL TOTAL CA: CPT

## 2022-06-04 RX ORDER — DOCUSATE SODIUM 100 MG/1
100 CAPSULE, LIQUID FILLED ORAL DAILY
Status: DISCONTINUED | OUTPATIENT
Start: 2022-06-04 | End: 2022-06-07 | Stop reason: HOSPADM

## 2022-06-04 RX ORDER — HEPARIN SODIUM 5000 [USP'U]/ML
5000 INJECTION, SOLUTION INTRAVENOUS; SUBCUTANEOUS EVERY 8 HOURS SCHEDULED
Status: DISCONTINUED | OUTPATIENT
Start: 2022-06-04 | End: 2022-06-07 | Stop reason: HOSPADM

## 2022-06-04 RX ORDER — CIPROFLOXACIN 2 MG/ML
400 INJECTION, SOLUTION INTRAVENOUS EVERY 12 HOURS
Status: DISCONTINUED | OUTPATIENT
Start: 2022-06-04 | End: 2022-06-07 | Stop reason: HOSPADM

## 2022-06-04 RX ORDER — HYDROCODONE BITARTRATE AND ACETAMINOPHEN 5; 325 MG/1; MG/1
1 TABLET ORAL EVERY 6 HOURS PRN
Status: DISCONTINUED | OUTPATIENT
Start: 2022-06-04 | End: 2022-06-07 | Stop reason: HOSPADM

## 2022-06-04 RX ADMIN — DEXTROSE AND SODIUM CHLORIDE: 5; 450 INJECTION, SOLUTION INTRAVENOUS at 01:01

## 2022-06-04 RX ADMIN — CIPROFLOXACIN 400 MG: 2 INJECTION, SOLUTION INTRAVENOUS at 16:20

## 2022-06-04 RX ADMIN — ACETAMINOPHEN 650 MG: 325 TABLET ORAL at 21:46

## 2022-06-04 RX ADMIN — SODIUM CHLORIDE, PRESERVATIVE FREE 10 ML: 5 INJECTION INTRAVENOUS at 11:06

## 2022-06-04 RX ADMIN — DOCUSATE SODIUM 100 MG: 100 CAPSULE, LIQUID FILLED ORAL at 16:18

## 2022-06-04 RX ADMIN — ACETAMINOPHEN 650 MG: 325 TABLET ORAL at 11:31

## 2022-06-04 RX ADMIN — METRONIDAZOLE 500 MG: 500 INJECTION, SOLUTION INTRAVENOUS at 06:17

## 2022-06-04 RX ADMIN — ACETAMINOPHEN 650 MG: 325 TABLET ORAL at 04:45

## 2022-06-04 RX ADMIN — DEXTROSE AND SODIUM CHLORIDE: 5; 450 INJECTION, SOLUTION INTRAVENOUS at 16:17

## 2022-06-04 RX ADMIN — CEFTRIAXONE 1000 MG: 1 INJECTION, POWDER, FOR SOLUTION INTRAMUSCULAR; INTRAVENOUS at 01:02

## 2022-06-04 RX ADMIN — METRONIDAZOLE 500 MG: 500 INJECTION, SOLUTION INTRAVENOUS at 12:14

## 2022-06-04 RX ADMIN — ONDANSETRON 4 MG: 2 INJECTION INTRAMUSCULAR; INTRAVENOUS at 02:46

## 2022-06-04 RX ADMIN — SODIUM CHLORIDE, PRESERVATIVE FREE 5 ML: 5 INJECTION INTRAVENOUS at 21:47

## 2022-06-04 RX ADMIN — MORPHINE SULFATE 2 MG: 2 INJECTION, SOLUTION INTRAMUSCULAR; INTRAVENOUS at 00:40

## 2022-06-04 RX ADMIN — HYDROCODONE BITARTRATE AND ACETAMINOPHEN 1 TABLET: 5; 325 TABLET ORAL at 13:26

## 2022-06-04 ASSESSMENT — PAIN DESCRIPTION - ONSET
ONSET: AWAKENED FROM SLEEP
ONSET: GRADUAL

## 2022-06-04 ASSESSMENT — PAIN SCALES - GENERAL
PAINLEVEL_OUTOF10: 3
PAINLEVEL_OUTOF10: 0
PAINLEVEL_OUTOF10: 1
PAINLEVEL_OUTOF10: 8
PAINLEVEL_OUTOF10: 6
PAINLEVEL_OUTOF10: 0
PAINLEVEL_OUTOF10: 6
PAINLEVEL_OUTOF10: 3
PAINLEVEL_OUTOF10: 0
PAINLEVEL_OUTOF10: 3

## 2022-06-04 ASSESSMENT — PAIN DESCRIPTION - PAIN TYPE
TYPE: ACUTE PAIN
TYPE: ACUTE PAIN

## 2022-06-04 ASSESSMENT — PAIN DESCRIPTION - LOCATION
LOCATION: HEAD
LOCATION: ABDOMEN

## 2022-06-04 ASSESSMENT — PAIN - FUNCTIONAL ASSESSMENT
PAIN_FUNCTIONAL_ASSESSMENT: ACTIVITIES ARE NOT PREVENTED
PAIN_FUNCTIONAL_ASSESSMENT: ACTIVITIES ARE NOT PREVENTED

## 2022-06-04 ASSESSMENT — PAIN DESCRIPTION - ORIENTATION
ORIENTATION: MID
ORIENTATION: MID;LOWER
ORIENTATION: MID
ORIENTATION: MID

## 2022-06-04 ASSESSMENT — PAIN DESCRIPTION - DESCRIPTORS
DESCRIPTORS: ACHING

## 2022-06-04 ASSESSMENT — PAIN DESCRIPTION - FREQUENCY
FREQUENCY: CONTINUOUS
FREQUENCY: INTERMITTENT

## 2022-06-04 NOTE — ED PROVIDER NOTES
Patient awaiting CT at shift change. CT shows diverticulitis with contained perforation. Discussed with surgery they felt downtown but hospitalist should admit since IR may be the one to intervene in this case. We will start metronidazole with Rocephin due to the patient's penicillin allergy. Patient is stable for transfer.      Elliot Jeffers MD  06/03/22 1860       Elliot Jeffers MD  06/04/22 8860

## 2022-06-04 NOTE — CONSULTS
H&P/Consult Note/Progress Note/Office Note:   Randal Vasquez  MRN: [id number]  :1965  Age:57 y.o.    HPI: Randal Vasquez is a 62 y.o. male who come with 50 H of abdominal pain and nausea. CT shows contained perforation at the level of his old anastomosis (LAR for diverticulitis as per patient) . He also complaints of chills, anorexia and general malaise. He is admitted for conservative treatment with IV antibiotics            Past Medical History:   Diagnosis Date    Alcoholism /alcohol abuse     Anxiety     Cardiomyopathy ([address] 2016    Diverticular disease     colon resection with no symptoms since surgery    Essential hypertension     managed with med.  GERD (gastroesophageal reflux disease)     takes medication but not altogether relieved    GI bleed 2/15/2020    Hiatal hernia with gastroesophageal reflux 2013    Hypertension     managed with med.       Past Surgical History:   Procedure Laterality Date    COLONOSCOPY  2020 in     COLONOSCOPY  3/2018,     10 years    COLONOSCOPY N/A 2020    COLONOSCOPY /29/ Room 630 performed by El[facility] Drake [facility] at [facility] ENDOSCOPY    COLONOSCOPY N/A 2020    COLONOSCOPY ROOM 517 performed by Juan Arteaga MD at [address] [place] [address],[address]  2017    renal cyst    HERNIA REPAIR      Surendra fundiplication    TOTAL COLECTOMY  2008     Current Facility-Administered Medications   Medication Dose Route Frequency    HYDROcodone-acetaminophen (NORCO) 5-325 MG per tablet 1 tablet  1 tablet Oral Q6H PRN    sodium chloride flush 0.9 % injection 5-40 mL  5-40 mL IntraVENous 2 times per day    sodium chloride flush 0.9 % injection 5-40 mL  5-40 mL IntraVENous PRN    0.9 % sodium chloride infusion   IntraVENous PRN    ondansetron (ZOFRAN-ODT) disintegrating tablet 4 mg  4 mg Oral Q8H PRN    Or    ondansetron (ZOFRAN) injection 4 mg  4 mg IntraVENous Q6H PRN    polyethylene glycol (GLYCOLAX) packet 17 g  17 g Oral Daily PRN    aluminum & magnesium hydroxide-simethicone (MAALOX) 200-200-20 MG/5ML suspension 30 mL  30 mL Oral Q6H PRN    acetaminophen (TYLENOL) tablet 650 mg  650 mg Oral Q6H PRN    Or    acetaminophen (TYLENOL) suppository 650 mg  650 mg Rectal Q6H PRN    dextrose 5 % and 0.45 % sodium chloride infusion   IntraVENous Continuous    cefTRIAXone (ROCEPHIN) 1000 mg IVPB in NS 50ml minibag  1,000 mg IntraVENous Q24H    metronidazole (FLAGYL) 500 mg in 0.9% NaCl 100 mL IVPB premix  500 mg IntraVENous Q12H    morphine injection 2 mg  2 mg IntraVENous Q4H PRN     Penicillins  Social History     Socioeconomic History    Marital status:      Spouse name: Not on file    Number of children: Not on file    Years of education: Not on file    Highest education level: Not on file   Occupational History    Not on file   Tobacco Use    Smoking status: Former Smoker     Quit date: 2015     Years since quittin.5    Smokeless tobacco: Never Used   Substance and Sexual Activity    Alcohol use: No     Alcohol/week: 0.0 standard drinks    Drug use: No    Sexual activity: Not on file   Other Topics Concern    Not on file   Social History Narrative    Not on file     Social Determinants of Health     Financial Resource Strain:     Difficulty of Paying Living Expenses: Not on file   Food Insecurity:     Worried About Running Out of Food in the Last Year: Not on file    [place] of Food in the Last Year: Not on file   Transportation Needs:     Lack of Transportation (Medical): Not on file    Lack of Transportation (Non-Medical):  Not on file   Physical Activity:     Days of Exercise per Week: Not on file    Minutes of Exercise per Session: Not on file   Stress:     Feeling of Stress : Not on file   Social Connections:     Frequency of Communication with Friends and Family: Not on file    Frequency of Social Gatherings with Friends and Family: Not on file    Attends Islam Services: Not on file   Felix Ponce Active Member of Clubs or Organizations: Not on file    Attends Club or Organization Meetings: Not on file    Marital Status: Not on file   Intimate Partner Violence:     Fear of Current or Ex-Partner: Not on file    Emotionally Abused: Not on file    Physically Abused: Not on file    Sexually Abused: Not on file   Housing Stability:     Unable to Pay for Housing in the Last Year: Not on file    Number of [place] in the Last Year: Not on file    Unstable Housing in the Last Year: Not on file     Social History     Tobacco Use   Smoking Status Former Smoker    Quit date: 2015    Years since quittin.5   Smokeless Tobacco Never Used     Family History   Problem Relation Age of Onset    No Known Problems Brother     Cancer Father         brain    Breast Cancer Mother      ROS: The patient has no difficulty with chest pain or shortness of breath. No fever or chills. Comprehensive review of systems was otherwise unremarkable except as noted above. Physical Exam:   BP 96/63   Pulse 79   Temp 98.6 °F (37 °C) (Oral)   Resp 18   Ht 5' 8\" (1.727 m)   Wt 190 lb (86.2 kg)   SpO2 92%   BMI 28.89 kg/m²   Vitals:    22 2328 22 0040 22 0243 22 0731   BP: 124/77  122/66 96/63   Pulse:   89 79   Resp:  17 24 18   Temp: 98.7 °F (37.1 °C)  98.6 °F (37 °C) 98.6 °F (37 °C)   TempSrc: Oral   Oral   SpO2: 96%  95% 92%   Weight:       Height:         No intake/output data recorded.  1901 -  0700  In: 0   Out: 400 [Urine:400]    Constitutional: Alert, oriented, cooperative patient in no acute distress; appears stated age    Eyes:Sclera are clear. EOMs intact  ENMT: no external lesions gross hearing normal; no obvious neck masses, no ear or lip lesions, nares normal  CV: RRR. Normal perfusion  Resp: No JVD. Breathing is  non-labored; no audible wheezing.     GI: soft and non-distended  , tender in suprapubic area, no signs of peritonitis, no incisional hernia Musculoskeletal: unremarkable with normal function. No embolic signs or cyanosis. Neuro:  Oriented; moves all 4; no focal deficits  Psychiatric: normal affect and mood, no memory impairment    Recent vitals (if inpt):  Patient Vitals for the past 24 hrs:   BP Temp Temp src Pulse Resp SpO2 Height Weight   06/04/22 0731 96/63 98.6 °F (37 °C) Oral 79 18 92 % -- --   06/04/22 0243 122/66 98.6 °F (37 °C) -- 89 24 95 % -- --   06/04/22 0040 -- -- -- -- 17 -- -- --   06/03/22 2328 124/77 98.7 °F (37.1 °C) Oral -- -- 96 % -- --   06/03/22 2315 124/77 98.7 °F (37.1 °C) Oral 76 15 -- 5' 8\" (1.727 m) 190 lb (86.2 kg)       Amount and/or Complexity of Data Reviewed and Analyzed:  I reviewed and analyzed all of the unique labs and radiologic studies that are shown below as well as any that are in the HPI, and any that are in the expanded problem list below  *Each unique test, order, or document contributes to the combination of 2 or combination of 3 in Category 1 below. For this visit I also reviewed old records and prior notes. Recent Labs     06/03/22  1758 06/03/22  1758 06/04/22  0343   WBC 8.0   < > 8.7   HGB 14.8   < > 13.5*      < > 171      < > 138   K 3.6   < > 3.5      < > 107   CO2 22   < > 25   BUN 9   < > 9   ALT 19  --   --     < > = values in this interval not displayed.      Review of most recent CBC  Lab Results   Component Value Date    WBC 8.7 06/04/2022    HGB 13.5 (L) 06/04/2022    HCT 40.6 (L) 06/04/2022    MCV 96.4 06/04/2022     06/04/2022       Review of most recent BMP  Lab Results   Component Value Date     06/04/2022    K 3.5 06/04/2022     06/04/2022    CO2 25 06/04/2022    BUN 9 06/04/2022    CREATININE 0.70 06/04/2022    GLUCOSE 118 06/04/2022    CALCIUM 8.5 06/04/2022        Review of most recent LFTs (and lipase if done)  Lab Results   Component Value Date    ALT 19 06/03/2022    AST 17 06/03/2022    ALKPHOS 47 06/03/2022    BILITOT 0.7 06/03/2022 Lab Results   Component Value Date    LIPASE 18 06/03/2022       Lab Results   Component Value Date    INR 0.9 02/20/2020    APTT 28.4 04/06/2021       Review of most recent HgbA1c  No results found for: LABA1C  No results found for: EAG    Nutritional assessment screen for wound healing issues:  Lab Results   Component Value Date    LABALBU 3.9 06/03/2022       @lastcovr@    Xray Result (most recent):  XR CHEST STANDARD TWO VW 03/15/2021    Narrative  EXAM: CHEST X-RAY, 2 VIEWS    INDICATION: Dyspnea on exertion    COMPARISON: Chest x-ray 9/9/2009    TECHNIQUE: Frontal and lateral views of the chest were obtained. FINDINGS: The lungs are clear and the pulmonary vasculature is normal. No  pneumothorax or pleural effusion. The cardiomediastinal silhouette is within  normal limits. The bones are diffusely demineralized. Multilevel degenerative  changes of the spine. Surgical clips project over the upper abdomen. Impression  No radiographic evidence of acute cardiopulmonary disease. CT Result (most recent):  CT ABDOMEN PELVIS W IV CONTRAST 06/03/2022    Narrative  HISTORY:  abdominal pain, history of diverticulitis, fever. Rectal pressure, 4-5  days duration. COMPARISON: 9/23/2020    EXAM: CT abdomen and pelvis with iv contrast    TECHNIQUE:  Thin section axial CT was performed from the lung bases through the symphysis  pubis during uneventful rapid bolus intravenous administration of 125 mL of  Isovue 370. Oral contrast was also administered. Radiation dose reduction  techniques were used for this study. Our CT scanners use one or all of the  following: Automated exposure control, adjustment of the mA and/or kV according  to patient size, use of iterative reconstruction. FINDINGS:    There is a small right middle lobe subpleural nodule measuring 3 mm (image 1). There is also a hiatal hernia present. CT abdomen: The liver and spleen enhances homogeneously without discrete  lesions.  There is no biliary ductal dilatation. The gallbladder, pancreas and  adrenal glands are normal. The kidneys enhance symmetrically. There is a cyst  within the lower pole the right kidney. Bowel loops in the upper abdomen are  normal. No definite upper abdominal lymphadenopathy seen. CT pelvis: The appendix is normal. There is marked pericolonic fat stranding at  the rectosigmoid junction, at the site of prior anastomosis. There is an air and  fluid collection at this level measuring 3.3 x 2.8 cm. The bladder and rectum  are normal. No pelvic adenopathy seen. No free air or free fluid seen within the  pelvis. Bone window evaluation demonstrates no aggressive osseous lesions. Impression  Findings compatible with contained perforation of diverticulitis. This is  present at the anastomotic site at the rectosigmoid junction    US Result (most recent):  [place] BILATERAL 04/06/2021    Narrative  Bilateral lower extremity venous ultrasound    INDICATION:  Pain and swelling, pulmonary embolus    Doppler ultrasound of both lower extremities was performed. FINDINGS:  There is normal flow in the greater saphenous, common femoral,  superficial femoral, and popliteal veins. Normal compression and augmentation is  demonstrated. The proximal right calf and are patent. There is thrombus within the peroneal  vein in the mid left calf. No fluid collections are seen in either leg. Impression  1. Thrombosis of the mid left peroneal vein below the level of the knee. 2.  No evidence of DVT in the right leg. Admission date (for inpatients): 6/3/2022   * No surgery found *  * No surgery found *        ASSESSMENT/PLAN:  Recurrent left sided diverticulitis with a contained perforation and a 3 cm abscess. He is S/P LAR for diverticulitis years ago.   Non septic, no peritonitis on exam    -NPO  -IV antibiotics  -will consult IR for possible drain placement

## 2022-06-04 NOTE — PLAN OF CARE
Problem: Discharge Planning  Goal: Discharge to home or other facility with appropriate resources  Outcome: Progressing  Flowsheets (Taken 6/3/2022 2328)  Discharge to home or other facility with appropriate resources:   Identify barriers to discharge with patient and caregiver   Identify discharge learning needs (meds, wound care, etc)     Problem: Pain  Goal: Verbalizes/displays adequate comfort level or baseline comfort level  Outcome: Progressing  Flowsheets (Taken 6/3/2022 2328)  Verbalizes/displays adequate comfort level or baseline comfort level:   Encourage patient to monitor pain and request assistance   Assess pain using appropriate pain scale   Administer analgesics based on type and severity of pain and evaluate response   Implement non-pharmacological measures as appropriate and evaluate response   Consider cultural and social influences on pain and pain management   Notify Licensed Independent Practitioner if interventions unsuccessful or patient reports new pain     Problem: Safety - Adult  Goal: Free from fall injury  Outcome: Progressing     Problem: Skin/Tissue Integrity  Goal: Absence of new skin breakdown  Description: 1. Monitor for areas of redness and/or skin breakdown  2. Assess vascular access sites hourly  3. Every 4-6 hours minimum:  Change oxygen saturation probe site  4. Every 4-6 hours:  If on nasal continuous positive airway pressure, respiratory therapy assess nares and determine need for appliance change or resting period.   Outcome: Progressing     Problem: Pain  Goal: Verbalizes/displays adequate comfort level or baseline comfort level  Outcome: Progressing  Flowsheets (Taken 6/3/2022 2328)  Verbalizes/displays adequate comfort level or baseline comfort level:   Encourage patient to monitor pain and request assistance   Assess pain using appropriate pain scale   Administer analgesics based on type and severity of pain and evaluate response   Implement non-pharmacological measures as appropriate and evaluate response   Consider cultural and social influences on pain and pain management   Notify Licensed Independent Practitioner if interventions unsuccessful or patient reports new pain     Problem: Safety - Adult  Goal: Free from fall injury  Outcome: Progressing     Problem: Skin/Tissue Integrity  Goal: Absence of new skin breakdown  Description: 1. Monitor for areas of redness and/or skin breakdown  2. Assess vascular access sites hourly  3. Every 4-6 hours minimum:  Change oxygen saturation probe site  4. Every 4-6 hours:  If on nasal continuous positive airway pressure, respiratory therapy assess nares and determine need for appliance change or resting period.   Outcome: Progressing

## 2022-06-04 NOTE — PROGRESS NOTES
TRANSFER - IN REPORT:    Verbal report received from Yue [facility] on Roger Poot being received from [facility] ED for routine progression of care. Report consisted of patients Situation, Background, Assessment and Recommendations(SBAR). Information from the following report(s) SBAR, Kardex, ED Summary and MAR was reviewed. Opportunity for questions and clarification was provided. Assessment completed upon patients arrival to unit and care assumed. Patient received to room 828. Patient connected to monitor and assessment completed. Plan of care reviewed. Patient oriented to room and call light. Patient aware to use call light to communicate any chest pain or needs. Admission skin assessment completed with second RN and reveals the following:   Sacrum and heels are intact. Abdomen semi-soft and slightly distended. Attempted dual skin sign off with Brittany Waddell RN, but it would not scan.

## 2022-06-04 NOTE — H&P
Hospitalist History and Physical   Admit Date:  6/3/2022 11:20 PM   Name:  Brenna Gonzalez   Age:  62 y.o. Sex:  male  :  1965   MRN:  [id number]   Room:  828/01    Presenting Complaint: \"Pt states he has had rectal pain for about a week, has hx of diverticulitis. Denies NVD, has had lethargy. Denies known fevers. States he feels like he needs to have BM, last BM was yesterday\"     Reason(s) for Admission: Diverticulitis of colon with perforation [K57.20]     History of Present Illness:   Brenna Gonzalez is a 62 y.o. male with past medical history of PE (s/p treatment  ), currently on no meds who presented to [facility] with complaints of rectal pressure and pain for the past 4 to 5 days. Last BM yesterday. Today nausea, decreased appetite, chills, T max 100.6. Aching from the waist down. No vomiting. History of diverticulitis with anterior colon resection 14 years ago. Has had GI bleed in past from diverticulosis. No bleeding recently.      In ER, he had low grade fever 100.1 and tachycardia  Labs otherwise benign  Rectal exam in ER revealed rectal tenderness but no masses felt. Imaging revealed diverticulitis with contained perforation    ER MD Discussed with surgery. They recommended downtown admission to hospitalist service as IR may be the one to intervene in this case. He was started on metronidazole with Rocephin due to the patient's penicillin allergy. Patient is stable for transfer. At time of arrival, he denies CP or SOB. Has Lower abd pain only that was relieved with morphine, though this has worn off    Review of Systems:  10 systems reviewed and negative except as noted in HPI.   Assessment & Plan:     Principal Problem:    Diverticulitis of intestine with perforation  Plan:   1) Admit med bed directly from  [facility]  2) Continue with IV abx and IVF  3) Will keep patient NPO  4) Consult General Surgery in AM  5) SCDs    Active Problems:    Diverticulitis of colon with perforation  Plan:       Dispo/Discharge Planning:     HOME    Diet: Diet NPO Exceptions are: Sips of Water with Meds  VTE ppx: SCDs  Code status: Full Code    Hospital Problems:  Principal Problem:    Diverticulitis of intestine with perforation  Active Problems:    Diverticulitis of colon with perforation  Resolved Problems:    * No resolved hospital problems. *       Past History:     Past Medical History:   Diagnosis Date    Alcoholism /alcohol abuse     Anxiety     Cardiomyopathy ([address] 2016    Diverticular disease     colon resection with no symptoms since surgery    Essential hypertension     managed with med.  GERD (gastroesophageal reflux disease)     takes medication but not altogether relieved    GI bleed 2/15/2020    Hiatal hernia with gastroesophageal reflux 2013    Hypertension     managed with med. Past Surgical History:   Procedure Laterality Date    COLONOSCOPY  2020     2 in 2020    COLONOSCOPY  3/2018,     10 years    COLONOSCOPY N/A 2020    COLONOSCOPY // Room 630 performed by Russel Carmona MD at [place] [address] COLONOSCOPY N/A 2020    COLONOSCOPY ROOM 517 performed by April Lea MD at [address] [place] [address],[address]  2017    renal cyst    HERNIA REPAIR      Surendra fundiplication    TOTAL COLECTOMY  2008      Allergies   Allergen Reactions    Penicillins Hives      Social History     Tobacco Use    Smoking status: Former Smoker     Quit date: 2015     Years since quittin.5    Smokeless tobacco: Never Used   Substance Use Topics    Alcohol use: No     Alcohol/week: 0.0 standard drinks      Family History   Problem Relation Age of Onset    No Known Problems Brother     Cancer Father         brain    Breast Cancer Mother       Family history reviewed and negative except as noted above.       Immunization History   Administered Date(s) Administered    COVID-19, Pfizer Purple top, DILUTE for use, 12+ yrs, 30mcg/0.3mL Micro    Collection Time: 06/03/22  5:58 PM   Result Value Ref Range    WBC, UA 0-3 0 /hpf    RBC, UA 3-5 0 /hpf    Epithelial Cells UA 0 0 /hpf    BACTERIA, URINE 1+ (H) 0 /hpf    Casts 0 0 /lpf    Crystals 0 0 /LPF    Mucus, UA 1+ (H) 0 /lpf    OTHER OBSERVATIONS RESULTS VERIFIED MANUALLY         Other Studies:  CT ABDOMEN PELVIS W IV CONTRAST Additional Contrast? Oral    Result Date: 6/3/2022  HISTORY:  abdominal pain, history of diverticulitis, fever. Rectal pressure, 4-5 days duration. COMPARISON: 9/23/2020 EXAM: CT abdomen and pelvis with iv contrast TECHNIQUE: Thin section axial CT was performed from the lung bases through the symphysis pubis during uneventful rapid bolus intravenous administration of 125 mL of Isovue 370. Oral contrast was also administered. Radiation dose reduction techniques were used for this study. Our CT scanners use one or all of the following: Automated exposure control, adjustment of the mA and/or kV according to patient size, use of iterative reconstruction. FINDINGS: There is a small right middle lobe subpleural nodule measuring 3 mm (image 1). There is also a hiatal hernia present. CT abdomen: The liver and spleen enhances homogeneously without discrete lesions. There is no biliary ductal dilatation. The gallbladder, pancreas and adrenal glands are normal. The kidneys enhance symmetrically. There is a cyst within the lower pole the right kidney. Bowel loops in the upper abdomen are normal. No definite upper abdominal lymphadenopathy seen. CT pelvis: The appendix is normal. There is marked pericolonic fat stranding at the rectosigmoid junction, at the site of prior anastomosis. There is an air and fluid collection at this level measuring 3.3 x 2.8 cm. The bladder and rectum are normal. No pelvic adenopathy seen. No free air or free fluid seen within the pelvis. Bone window evaluation demonstrates no aggressive osseous lesions.      Findings compatible with contained perforation of diverticulitis. This is present at the anastomotic site at the rectosigmoid junction       Echocardiogram:  No results found for this or any previous visit.       Meds previously ordered:  Orders Placed This Encounter   Medications    sodium chloride flush 0.9 % injection 5-40 mL    sodium chloride flush 0.9 % injection 5-40 mL    0.9 % sodium chloride infusion    OR Linked Order Group     ondansetron (ZOFRAN-ODT) disintegrating tablet 4 mg     ondansetron (ZOFRAN) injection 4 mg    polyethylene glycol (GLYCOLAX) packet 17 g    aluminum & magnesium hydroxide-simethicone (MAALOX) 200-200-20 MG/5ML suspension 30 mL    OR Linked Order Group     acetaminophen (TYLENOL) tablet 650 mg     acetaminophen (TYLENOL) suppository 650 mg    dextrose 5 % and 0.45 % sodium chloride infusion    cefTRIAXone (ROCEPHIN) 1000 mg IVPB in NS 50ml minibag     Order Specific Question:   Antimicrobial Indications     Answer:   Intra-Abdominal Infection    metronidazole (FLAGYL) 500 mg in 0.9% NaCl 100 mL IVPB premix     Order Specific Question:   Antimicrobial Indications     Answer:   Intra-Abdominal Infection    morphine injection 2 mg       Signed:  Rory Rivera MD

## 2022-06-04 NOTE — ED NOTES
TRANSFER - OUT REPORT:    Verbal report given to BARBIE Alvarez on [place]  being transferred to [facility] 8th floor for routine progression of patient care       Report consisted of patient's Situation, Background, Assessment and   Recommendations(SBAR). Information from the following report(s) Nurse Handoff Report, ED Encounter Summary, ED SBAR, [facility], Recent Results and Med Rec Status was reviewed with the receiving nurse. Lines:   Peripheral IV 06/03/22 Left Antecubital (Active)   Site Assessment Clean, dry & intact 06/03/22 2231   Line Status Flushed;Blood return noted; Infusing 06/03/22 2231   Line Care Cap changed 06/03/22 2231   Phlebitis Assessment No symptoms 06/03/22 2231   Infiltration Assessment 0 06/03/22 2231   Alcohol Cap Used No 06/03/22 2231   Dressing Status New dressing applied;Clean, dry & intact 06/03/22 2231   Dressing Type Transparent 06/03/22 2231   Dressing Intervention New 06/03/22 2231        Opportunity for questions and clarification was provided.       Patient transported with:  EMS       Duglas Orosco RN  06/03/22 8373

## 2022-06-04 NOTE — PROGRESS NOTES
Hospitalist Progress Note   Admit Date:  6/3/2022 11:20 PM   Name:  Myriam Fraley   Age:  62 y.o. Sex:  male  :  1965   MRN:  [id number]   Room:  828/    Presenting Complaint: No chief complaint on file. Reason(s) for Admission: Diverticulitis of colon with perforation [facility] Hospital Course & Interval History:   62 y.o. male with past medical history of PE (s/p treatment  ), currently on no meds who presented to [facility] ER with complaints of rectal pressure and pain for the past 4 to 5 days. Last BM day PTA. on 6/3, had nausea, decreased appetite, chills, T max 100.6. Aching from the waist down. No vomiting. History of diverticulitis with anterior colon resection 14 years ago. Has had GI bleed in past from diverticulosis. No bleeding recently. In ER, he had low grade fever 100.1 and tachycardia. Rectal exam in ER revealed rectal tenderness but no masses felt. Imaging revealed diverticulitis with contained perforation     ER MD Discussed with surgery. They recommended downtown admission to hospitalist service as IR may be the one to intervene in this case. [facility] was started on metronidazole with Rocephin due to the patient's penicillin allergy.      Patient is admitted for diverticulitis with contained perforation. Pt placed on broad-spectrum antibiotics. General surgery and IR have been consulted. Subjective/24hr Events (22): Assessment & Plan:     Diverticulitis of intestine with perforation  CTAP shows contained perforation of diverticulitis present at anastomotic site at the rectosigmoid junction  ABX: Change Rocephin to Cipro/Flagyl (-. ..) Empirically  N.p.o. except meds and ice chips  IVF  Analgesics and antiemetics as needed  General surgery on board, appreciate recs  Consult IR for drain placement    Constipation  Colace  Dulcolax prn    Dilated cardiomyopathy  EtOH induced (quit since )  [facility] [employer] cardiology outpatient    Hx of PE/DVT  Follows pulmonary outpatient. Had VTE after having covid infection  Finished with Xarelto 2 months ago per patient    Hx HTN  Normotensive w/o antihypertensives        Discharge Planning:      >2 midnights pending surgery, IR recs    Diet:  Diet NPO Exceptions are: Sips of Water with Meds  DVT PPx: heparin SQ  Code status: Full Code    Hospital Problems:  Principal Problem:    Diverticulitis of intestine with perforation  Active Problems:    Diverticulitis of colon with perforation  Resolved Problems:    * No resolved hospital problems. *      Objective:     Patient Vitals for the past 24 hrs:   Temp Pulse Resp BP SpO2   06/04/22 1113 98.6 °F (37 °C) 64 18 103/66 96 %   06/04/22 0731 98.6 °F (37 °C) 79 18 96/63 92 %   06/04/22 0243 98.6 °F (37 °C) 89 24 122/66 95 %   06/04/22 0040 -- -- 17 -- --   06/03/22 2328 98.7 °F (37.1 °C) -- -- 124/77 96 %   06/03/22 2315 98.7 °F (37.1 °C) 76 15 124/77 --       Oxygen Therapy  SpO2: 96 %  O2 Device: None (Room air)    Estimated body mass index is 28.89 kg/m² as calculated from the following:    Height as of this encounter: 5' 8\" (1.727 m). Weight as of this encounter: 190 lb (86.2 kg). Intake/Output Summary (Last 24 hours) at 6/4/2022 1337  Last data filed at 6/4/2022 0931  Gross per 24 hour   Intake 0 ml   Output 400 ml   Net -400 ml         Physical Exam:     Blood pressure 103/66, pulse 64, temperature 98.6 °F (37 °C), temperature source Oral, resp. rate 18, height 5' 8\" (1.727 m), weight 190 lb (86.2 kg), SpO2 96 %. General:    Well nourished. Head:  Normocephalic, atraumatic  Eyes:  Sclerae appear normal.  Pupils equally round. ENT:  Nares appear normal, no drainage. Moist oral mucosa  Neck:  No restricted ROM. Trachea midline   CV:   RRR. No m/r/g. No jugular venous distension. Lungs:   CTAB. No wheezing, rhonchi, or rales. Respirations even, unlabored  Abdomen: Bowel sounds present.   Soft, nondistended, mild TTP on lower abdominal region without rebound or guarding. Extremities: No cyanosis or clubbing. No edema  Skin:     No rashes and normal coloration. Warm and dry. Neuro:  CN II-XII grossly intact. Sensation intact. A&Ox3  Psych:  Normal mood and affect.       I have reviewed ordered lab tests and independently visualized imaging below:    Recent Labs:  Recent Results (from the past 48 hour(s))   CBC with Diff    Collection Time: 06/03/22  5:58 PM   Result Value Ref Range    WBC 8.0 4.3 - 11.1 K/uL    RBC 4.53 4.23 - 5.60 M/uL    Hemoglobin 14.8 13.6 - 17.2 g/dL    Hematocrit 43.4 41.1 - 50.3 %    MCV 95.8 79.6 - 97.8 FL    MCH 32.7 26.1 - 32.9 PG    MCHC 34.1 31.4 - 35.0 g/dL    RDW 12.4 11.9 - 14.6 %    Platelets 791 206 - 564 K/uL    MPV 8.9 (L) 9.4 - 12.3 FL    nRBC 0.00 0.0 - 0.2 K/uL    Differential Type AUTOMATED      Seg Neutrophils 80 (H) 43 - 78 %    Lymphocytes 13 13 - 44 %    Monocytes 7 4.0 - 12.0 %    Eosinophils % 0 (L) 0.5 - 7.8 %    Basophils 0 0.0 - 2.0 %    Immature Granulocytes 0 0.0 - 5.0 %    Segs Absolute 6.3 1.7 - 8.2 K/UL    Absolute Lymph # 1.0 0.5 - 4.6 K/UL    Absolute Mono # 0.6 0.1 - 1.3 K/UL    Absolute Eos # 0.0 0.0 - 0.8 K/UL    Basophils Absolute 0.0 0.0 - 0.2 K/UL    Absolute Immature Granulocyte 0.0 0.0 - 0.5 K/UL   CMP    Collection Time: 06/03/22  5:58 PM   Result Value Ref Range    Sodium 138 136 - 145 mmol/L    Potassium 3.6 3.5 - 5.1 mmol/L    Chloride 103 98 - 107 mmol/L    CO2 22 21 - 32 mmol/L    Anion Gap 13 7.0 - 16.0 mmol/L    Glucose 105 (H) 65 - 100 mg/dL    BUN 9 7.0 - 18.0 MG/DL    CREATININE 0.69 (L) 0.8 - 1.5 MG/DL    GFR African American >152 >60 ml/min/1.73m2    GFR Non- >60 >60 ml/min/1.73m2    Calcium 9.1 8.3 - 10.4 MG/DL    Total Bilirubin 0.7 0.2 - 1.1 MG/DL    ALT 19 13.0 - 61.0 U/L    AST 17 15 - 37 U/L    Alk Phosphatase 47 45.0 - 117.0 U/L    Total Protein 6.8 6.4 - 8.2 g/dL    Albumin 3.9 3.5 - 5.0 g/dL    Globulin 2.9 2.3 - 3.5 g/dL    Albumin/Globulin Ratio 1.3     Lipase Collection Time: 06/03/22  5:58 PM   Result Value Ref Range    Lipase 18 13 - 60 U/L   Urinalysis w rflx microscopic    Collection Time: 06/03/22  5:58 PM   Result Value Ref Range    Color, UA YELLOW      Appearance CLEAR      Specific Gravity, UA 1.015 1.001 - 1.023      pH, Urine 6.0 5.0 - 9.0      Protein, UA Negative NEG mg/dL    Glucose, UA Negative mg/dL    Ketones, Urine 40 (A) NEG mg/dL    Bilirubin Urine Negative NEG      Blood, Urine TRACE (A) NEG      Urobilinogen, Urine 0.2 0.2 - 1.0 EU/dL    Nitrite, Urine Negative NEG      Leukocyte Esterase, Urine Negative NEG     Urinalysis, Micro    Collection Time: 06/03/22  5:58 PM   Result Value Ref Range    WBC, UA 0-3 0 /hpf    RBC, UA 3-5 0 /hpf    Epithelial Cells UA 0 0 /hpf    BACTERIA, URINE 1+ (H) 0 /hpf    Casts 0 0 /lpf    Crystals 0 0 /LPF    Mucus, UA 1+ (H) 0 /lpf    OTHER OBSERVATIONS RESULTS VERIFIED MANUALLY     Basic Metabolic Panel w/ Reflex to MG    Collection Time: 06/04/22  3:43 AM   Result Value Ref Range    Sodium 138 136 - 145 mmol/L    Potassium 3.5 3.5 - 5.1 mmol/L    Chloride 107 98 - 107 mmol/L    CO2 25 21 - 32 mmol/L    Anion Gap 6 (L) 7 - 16 mmol/L    Glucose 118 (H) 65 - 100 mg/dL    BUN 9 6 - 23 MG/DL    CREATININE 0.70 (L) 0.8 - 1.5 MG/DL    GFR African American >60 >60 ml/min/1.73m2    GFR Non- >60 >60 ml/min/1.73m2    Calcium 8.5 8.3 - 10.4 MG/DL   CBC with Auto Differential    Collection Time: 06/04/22  3:43 AM   Result Value Ref Range    WBC 8.7 4.3 - 11.1 K/uL    RBC 4.21 (L) 4.23 - 5.6 M/uL    Hemoglobin 13.5 (L) 13.6 - 17.2 g/dL    Hematocrit 40.6 (L) 41.1 - 50.3 %    MCV 96.4 79.6 - 97.8 FL    MCH 32.1 26.1 - 32.9 PG    MCHC 33.3 31.4 - 35.0 g/dL    RDW 12.8 11.9 - 14.6 %    Platelets 522 281 - 997 K/uL    MPV 9.6 9.4 - 12.3 FL    nRBC 0.00 0.0 - 0.2 K/uL    Differential Type AUTOMATED      Seg Neutrophils 88 (H) 43 - 78 %    Lymphocytes 7 (L) 13 - 44 %    Monocytes 4 4.0 - 12.0 %    Eosinophils % 0 (L) 0.5 - 7.8 %    Basophils 0 0.0 - 2.0 %    Immature Granulocytes 1 0.0 - 5.0 %    Segs Absolute 7.6 1.7 - 8.2 K/UL    Absolute Lymph # 0.6 0.5 - 4.6 K/UL    Absolute Mono # 0.4 0.1 - 1.3 K/UL    Absolute Eos # 0.0 0.0 - 0.8 K/UL    Basophils Absolute 0.0 0.0 - 0.2 K/UL    Absolute Immature Granulocyte 0.0 0.0 - 0.5 K/UL   Magnesium    Collection Time: 06/04/22  3:43 AM   Result Value Ref Range    Magnesium 2.1 1.8 - 2.4 mg/dL       Other Studies:  CT ABDOMEN PELVIS W IV CONTRAST Additional Contrast? Oral    Result Date: 6/3/2022  HISTORY:  abdominal pain, history of diverticulitis, fever. Rectal pressure, 4-5 days duration. COMPARISON: 9/23/2020 EXAM: CT abdomen and pelvis with iv contrast TECHNIQUE: Thin section axial CT was performed from the lung bases through the symphysis pubis during uneventful rapid bolus intravenous administration of 125 mL of Isovue 370. Oral contrast was also administered. Radiation dose reduction techniques were used for this study. Our CT scanners use one or all of the following: Automated exposure control, adjustment of the mA and/or kV according to patient size, use of iterative reconstruction. FINDINGS: There is a small right middle lobe subpleural nodule measuring 3 mm (image 1). There is also a hiatal hernia present. CT abdomen: The liver and spleen enhances homogeneously without discrete lesions. There is no biliary ductal dilatation. The gallbladder, pancreas and adrenal glands are normal. The kidneys enhance symmetrically. There is a cyst within the lower pole the right kidney. Bowel loops in the upper abdomen are normal. No definite upper abdominal lymphadenopathy seen. CT pelvis: The appendix is normal. There is marked pericolonic fat stranding at the rectosigmoid junction, at the site of prior anastomosis. There is an air and fluid collection at this level measuring 3.3 x 2.8 cm. The bladder and rectum are normal. No pelvic adenopathy seen.  No free air or free fluid seen within the pelvis. Bone window evaluation demonstrates no aggressive osseous lesions. Findings compatible with contained perforation of diverticulitis. This is present at the anastomotic site at the rectosigmoid junction       Current Meds:  Current Facility-Administered Medications   Medication Dose Route Frequency    HYDROcodone-acetaminophen (NORCO) 5-325 MG per tablet 1 tablet  1 tablet Oral Q6H PRN    ciprofloxacin (CIPRO) IVPB 400 mg  400 mg IntraVENous Q12H    docusate sodium (COLACE) capsule 100 mg  100 mg Oral Daily    sodium chloride flush 0.9 % injection 5-40 mL  5-40 mL IntraVENous 2 times per day    sodium chloride flush 0.9 % injection 5-40 mL  5-40 mL IntraVENous PRN    0.9 % sodium chloride infusion   IntraVENous PRN    ondansetron (ZOFRAN-ODT) disintegrating tablet 4 mg  4 mg Oral Q8H PRN    Or    ondansetron (ZOFRAN) injection 4 mg  4 mg IntraVENous Q6H PRN    polyethylene glycol (GLYCOLAX) packet 17 g  17 g Oral Daily PRN    aluminum & magnesium hydroxide-simethicone (MAALOX) 200-200-20 MG/5ML suspension 30 mL  30 mL Oral Q6H PRN    acetaminophen (TYLENOL) tablet 650 mg  650 mg Oral Q6H PRN    Or    acetaminophen (TYLENOL) suppository 650 mg  650 mg Rectal Q6H PRN    dextrose 5 % and 0.45 % sodium chloride infusion   IntraVENous Continuous    metronidazole (FLAGYL) 500 mg in 0.9% NaCl 100 mL IVPB premix  500 mg IntraVENous Q12H    morphine injection 2 mg  2 mg IntraVENous Q4H PRN       Signed: Sergio Cortes DO    Part of this note may have been written by using a voice dictation software. The note has been proof read but may still contain some grammatical/other typographical errors.

## 2022-06-05 PROCEDURE — 2500000003 HC RX 250 WO HCPCS: Performed by: HOSPITALIST

## 2022-06-05 PROCEDURE — 6370000000 HC RX 637 (ALT 250 FOR IP): Performed by: FAMILY MEDICINE

## 2022-06-05 PROCEDURE — 6370000000 HC RX 637 (ALT 250 FOR IP): Performed by: HOSPITALIST

## 2022-06-05 PROCEDURE — 6360000002 HC RX W HCPCS: Performed by: FAMILY MEDICINE

## 2022-06-05 PROCEDURE — 1100000000 HC RM PRIVATE

## 2022-06-05 PROCEDURE — 2580000003 HC RX 258: Performed by: HOSPITALIST

## 2022-06-05 RX ADMIN — METRONIDAZOLE 500 MG: 500 INJECTION, SOLUTION INTRAVENOUS at 00:27

## 2022-06-05 RX ADMIN — ACETAMINOPHEN 650 MG: 325 TABLET ORAL at 19:34

## 2022-06-05 RX ADMIN — METRONIDAZOLE 500 MG: 500 INJECTION, SOLUTION INTRAVENOUS at 12:08

## 2022-06-05 RX ADMIN — DEXTROSE AND SODIUM CHLORIDE: 5; 450 INJECTION, SOLUTION INTRAVENOUS at 12:08

## 2022-06-05 RX ADMIN — ACETAMINOPHEN 650 MG: 325 TABLET ORAL at 09:15

## 2022-06-05 RX ADMIN — SODIUM CHLORIDE, PRESERVATIVE FREE 5 ML: 5 INJECTION INTRAVENOUS at 20:59

## 2022-06-05 RX ADMIN — SODIUM CHLORIDE, PRESERVATIVE FREE 10 ML: 5 INJECTION INTRAVENOUS at 08:52

## 2022-06-05 RX ADMIN — CIPROFLOXACIN 400 MG: 2 INJECTION, SOLUTION INTRAVENOUS at 04:57

## 2022-06-05 RX ADMIN — DOCUSATE SODIUM 100 MG: 100 CAPSULE, LIQUID FILLED ORAL at 08:38

## 2022-06-05 RX ADMIN — PSYLLIUM HUSK 1 PACKET: 3.4 POWDER ORAL at 12:37

## 2022-06-05 RX ADMIN — CIPROFLOXACIN 400 MG: 2 INJECTION, SOLUTION INTRAVENOUS at 16:16

## 2022-06-05 ASSESSMENT — PAIN DESCRIPTION - PAIN TYPE: TYPE: ACUTE PAIN

## 2022-06-05 ASSESSMENT — PAIN - FUNCTIONAL ASSESSMENT: PAIN_FUNCTIONAL_ASSESSMENT: ACTIVITIES ARE NOT PREVENTED

## 2022-06-05 ASSESSMENT — PAIN DESCRIPTION - ONSET: ONSET: GRADUAL

## 2022-06-05 ASSESSMENT — PAIN SCALES - GENERAL
PAINLEVEL_OUTOF10: 3
PAINLEVEL_OUTOF10: 2
PAINLEVEL_OUTOF10: 7
PAINLEVEL_OUTOF10: 0

## 2022-06-05 ASSESSMENT — PAIN DESCRIPTION - DESCRIPTORS
DESCRIPTORS: ACHING
DESCRIPTORS: ACHING;THROBBING

## 2022-06-05 ASSESSMENT — PAIN DESCRIPTION - FREQUENCY: FREQUENCY: INTERMITTENT

## 2022-06-05 ASSESSMENT — PAIN DESCRIPTION - LOCATION
LOCATION: HEAD
LOCATION: HEAD

## 2022-06-05 ASSESSMENT — PAIN DESCRIPTION - ORIENTATION: ORIENTATION: MID

## 2022-06-05 NOTE — PROGRESS NOTES
Patient voices no concerns at this time. Patient is resting in bed with no complaints at this time. Patient denies any pain and appears comfortable at this time. Call light within reach and patient instructed to call if assistance is needed. Will continue to follow.

## 2022-06-05 NOTE — PROGRESS NOTES
The patient had GS surgery on 8/21 and called to report that for the past 2-3 days, she has been experiencing nausea, feeling poorly, headaches and leg pain.  She notes that she increased her calcium intake and notes this helped with the leg pain some.  She would like to know what you recommend, especially for her nausea.    Any recommendations for this patient?    polyethylene glycol (GLYCOLAX) packet 17 g  17 g Oral Daily PRN    aluminum & magnesium hydroxide-simethicone (MAALOX) 200-200-20 MG/5ML suspension 30 mL  30 mL Oral Q6H PRN    acetaminophen (TYLENOL) tablet 650 mg  650 mg Oral Q6H PRN    Or    acetaminophen (TYLENOL) suppository 650 mg  650 mg Rectal Q6H PRN    dextrose 5 % and 0.45 % sodium chloride infusion   IntraVENous Continuous    metronidazole (FLAGYL) 500 mg in 0.9% NaCl 100 mL IVPB premix  500 mg IntraVENous Q12H    morphine injection 2 mg  2 mg IntraVENous Q4H PRN     Penicillins  Social History     Socioeconomic History    Marital status:      Spouse name: Not on file    Number of children: Not on file    Years of education: Not on file    Highest education level: Not on file   Occupational History    Not on file   Tobacco Use    Smoking status: Former Smoker     Quit date: 2015     Years since quittin.5    Smokeless tobacco: Never Used   Substance and Sexual Activity    Alcohol use: No     Alcohol/week: 0.0 standard drinks    Drug use: No    Sexual activity: Not on file   Other Topics Concern    Not on file   Social History Narrative    Not on file     Social Determinants of Health     Financial Resource Strain:     Difficulty of Paying Living Expenses: Not on file   Food Insecurity:     Worried About Running Out of Food in the Last Year: Not on file    [address] of Food in the Last Year: Not on file   Transportation Needs:     Lack of Transportation (Medical): Not on file    Lack of Transportation (Non-Medical):  Not on file   Physical Activity:     Days of Exercise per Week: Not on file    Minutes of Exercise per Session: Not on file   Stress:     Feeling of Stress : Not on file   Social Connections:     Frequency of Communication with Friends and Family: Not on file    Frequency of Social Gatherings with Friends and Family: Not on file    Attends Yazidism Services: Not on file   [employer]s or Organizations: Not on file    Attends Club or Organization Meetings: Not on file    Marital Status: Not on file   Intimate Partner Violence:     Fear of Current or Ex-Partner: Not on file    Emotionally Abused: Not on file    Physically Abused: Not on file    Sexually Abused: Not on file   Housing Stability:     Unable to Pay for Housing in the Last Year: Not on file    Number of [place] in the Last Year: Not on file    Unstable Housing in the Last Year: Not on file     Social History     Tobacco Use   Smoking Status Former Smoker    Quit date: 2015    Years since quittin.5   Smokeless Tobacco Never Used     Family History   Problem Relation Age of Onset    No Known Problems Brother     Cancer Father         brain    Breast Cancer Mother      ROS: The patient has no difficulty with chest pain or shortness of breath. No fever or chills. Comprehensive review of systems was otherwise unremarkable except as noted above. Physical Exam:   /75   Pulse 68   Temp 98.1 °F (36.7 °C)   Resp 19   Ht 5' 8\" (1.727 m)   Wt 190 lb (86.2 kg)   SpO2 95%   BMI 28.89 kg/m²   Vitals:    22 1955 22 2233 22 0358 22 0719   BP: 120/80 102/63 108/69 125/75   Pulse: 82 90 76 68   Resp: 18 18 16 19   Temp: 98.2 °F (36.8 °C) 98.8 °F (37.1 °C) 98.5 °F (36.9 °C) 98.1 °F (36.7 °C)   TempSrc:  Oral Oral    SpO2: 93% 94% 95% 95%   Weight:       Height:         No intake/output data recorded.  1901 -  0700  In: 655.4 [I.V.:498.8]  Out: 400 [Urine:400]    Constitutional: Alert, oriented, cooperative patient in no acute distress; appears stated age    Eyes:Sclera are clear. EOMs intact  ENMT: no external lesions gross hearing normal; no obvious neck masses, no ear or lip lesions, nares normal  CV: RRR. Normal perfusion  Resp: No JVD. Breathing is  non-labored; no audible wheezing.     GI: soft and non-distended , tender in suprapubic area, no peritonitis Musculoskeletal: unremarkable with normal function. No embolic signs or cyanosis. Neuro:  Oriented; moves all 4; no focal deficits  Psychiatric: normal affect and mood, no memory impairment    Recent vitals (if inpt):  Patient Vitals for the past 24 hrs:   BP Temp Temp src Pulse Resp SpO2   06/05/22 0719 125/75 98.1 °F (36.7 °C) -- 68 19 95 %   06/05/22 0358 108/69 98.5 °F (36.9 °C) Oral 76 16 95 %   06/04/22 2233 102/63 98.8 °F (37.1 °C) Oral 90 18 94 %   06/04/22 1955 120/80 98.2 °F (36.8 °C) -- 82 18 93 %   06/04/22 1509 108/66 98.1 °F (36.7 °C) Oral 66 18 94 %   06/04/22 1113 103/66 98.6 °F (37 °C) Oral 64 18 96 %       Amount and/or Complexity of Data Reviewed and Analyzed:  I reviewed and analyzed all of the unique labs and radiologic studies that are shown below as well as any that are in the HPI, and any that are in the expanded problem list below  *Each unique test, order, or document contributes to the combination of 2 or combination of 3 in Category 1 below. For this visit I also reviewed old records and prior notes. Recent Labs     06/03/22  1758 06/03/22  1758 06/04/22  0343   WBC 8.0   < > 8.7   HGB 14.8   < > 13.5*      < > 171      < > 138   K 3.6   < > 3.5      < > 107   CO2 22   < > 25   BUN 9   < > 9   ALT 19  --   --     < > = values in this interval not displayed.      Review of most recent CBC  Lab Results   Component Value Date    WBC 8.7 06/04/2022    HGB 13.5 (L) 06/04/2022    HCT 40.6 (L) 06/04/2022    MCV 96.4 06/04/2022     06/04/2022       Review of most recent BMP  Lab Results   Component Value Date     06/04/2022    K 3.5 06/04/2022     06/04/2022    CO2 25 06/04/2022    BUN 9 06/04/2022    CREATININE 0.70 06/04/2022    GLUCOSE 118 06/04/2022    CALCIUM 8.5 06/04/2022        Review of most recent LFTs (and lipase if done)  Lab Results   Component Value Date    ALT 19 06/03/2022    AST 17 06/03/2022    ALKPHOS 47 06/03/2022    BILITOT 0.7 06/03/2022     Lab Results   Component Value Date    LIPASE 18 06/03/2022       Lab Results   Component Value Date    INR 0.9 02/20/2020    APTT 28.4 04/06/2021       Review of most recent HgbA1c  No results found for: LABA1C  No results found for: EAG    Nutritional assessment screen for wound healing issues:  Lab Results   Component Value Date    LABALBU 3.9 06/03/2022       @lastcovr@    Xray Result (most recent):  XR CHEST STANDARD TWO VW 03/15/2021    Narrative  EXAM: CHEST X-RAY, 2 VIEWS    INDICATION: Dyspnea on exertion    COMPARISON: Chest x-ray 9/9/2009    TECHNIQUE: Frontal and lateral views of the chest were obtained. FINDINGS: The lungs are clear and the pulmonary vasculature is normal. No  pneumothorax or pleural effusion. The cardiomediastinal silhouette is within  normal limits. The bones are diffusely demineralized. Multilevel degenerative  changes of the spine. Surgical clips project over the upper abdomen. Impression  No radiographic evidence of acute cardiopulmonary disease. CT Result (most recent):  CT ABDOMEN PELVIS W IV CONTRAST 06/03/2022    Narrative  HISTORY:  abdominal pain, history of diverticulitis, fever. Rectal pressure, 4-5  days duration. COMPARISON: 9/23/2020    EXAM: CT abdomen and pelvis with iv contrast    TECHNIQUE:  Thin section axial CT was performed from the lung bases through the symphysis  pubis during uneventful rapid bolus intravenous administration of 125 mL of  Isovue 370. Oral contrast was also administered. Radiation dose reduction  techniques were used for this study. Our CT scanners use one or all of the  following: Automated exposure control, adjustment of the mA and/or kV according  to patient size, use of iterative reconstruction. FINDINGS:    There is a small right middle lobe subpleural nodule measuring 3 mm (image 1). There is also a hiatal hernia present. CT abdomen: The liver and spleen enhances homogeneously without discrete  lesions. There is no biliary ductal dilatation. The gallbladder, pancreas and  adrenal glands are normal. The kidneys enhance symmetrically. There is a cyst  within the lower pole the right kidney. Bowel loops in the upper abdomen are  normal. No definite upper abdominal lymphadenopathy seen. CT pelvis: The appendix is normal. There is marked pericolonic fat stranding at  the rectosigmoid junction, at the site of prior anastomosis. There is an air and  fluid collection at this level measuring 3.3 x 2.8 cm. The bladder and rectum  are normal. No pelvic adenopathy seen. No free air or free fluid seen within the  pelvis. Bone window evaluation demonstrates no aggressive osseous lesions. Impression  Findings compatible with contained perforation of diverticulitis. This is  present at the anastomotic site at the rectosigmoid junction    US Result (most recent):  [place] BILATERAL 04/06/2021    Narrative  Bilateral lower extremity venous ultrasound    INDICATION:  Pain and swelling, pulmonary embolus    Doppler ultrasound of both lower extremities was performed. FINDINGS:  There is normal flow in the greater saphenous, common femoral,  superficial femoral, and popliteal veins. Normal compression and augmentation is  demonstrated. The proximal right calf and are patent. There is thrombus within the peroneal  vein in the mid left calf. No fluid collections are seen in either leg. Impression  1. Thrombosis of the mid left peroneal vein below the level of the knee. 2.  No evidence of DVT in the right leg. Admission date (for inpatients): 6/3/2022           ASSESSMENT/PLAN:    Principal Problem:    Diverticulitis of intestine with perforation  Active Problems:    Diverticulitis of colon with perforation  Resolved Problems:    * No resolved hospital problems.  *     Patient Active Problem List    Diagnosis Date Noted    Diverticulitis of intestine with perforation 06/03/2022 Priority: Medium    Diverticulitis of colon with perforation 06/03/2022     Priority: Medium    AISHA (obstructive sleep apnea) 08/20/2021    Chronic deep vein thrombosis (DVT) of tibial vein of left lower extremity ([address] 04/08/2021    Nocturnal hypoxia 04/08/2021    Anxiety     Suspected sleep apnea 04/07/2021    History of GI bleed 04/06/2021    Pulmonary embolism, bilateral ([address] 04/06/2021    Exertional dyspnea 03/17/2021    Diverticulosis 02/15/2020    PVCs (premature ventricular contractions) 12/19/2018    GERD (gastroesophageal reflux disease) 12/19/2018    Recurrent depression ([address] 12/20/2017    Essential hypertension with goal blood pressure less than 130/85 01/19/2017    Myocardiopathy ([address] 06/30/2016          A/P    Recurrent descending colon diverticulitis with contained perforation .     -IV antibiotics  -will attempt IR drain in am   -sips of clears  -NPO after midnight

## 2022-06-05 NOTE — PROGRESS NOTES
Pt resting quietly in bed with wife at the bedside; A&O x4. Respirations even and unlabored on room air. Pt denies pain and reports no further needs at this time. Safety measures are in place. Cipro currently infusing at order rater per [facility] (delayed due to trying to obtain new IV access after old IV infiltration). Will continue to monitor and give report to oncoming RN.

## 2022-06-05 NOTE — PROGRESS NOTES
Patient walking around room independently with no complaints at this time. Patient is alert and orientated with no distress noted. Wife at bedside for support. IV intact and patent with no s/s of infection noted. Respirations even and unlabored with heart rate regular. Patient able to ambulate independently without assistance. Bed in low locked position with call light within reach. Patient instructed to call if assistance is needed. Will continue to monitor.

## 2022-06-05 NOTE — PROGRESS NOTES
Pt resting quietly in bed with wife at the bedside; A&O x4. Respirations even and unlabored on room air. Pt complains of a headache; PRN acetaminophen 650 mg given per MAR. No further needs reported at this time. D5 1/2 NS currently infusing at 75 mL/h. No signs of distress noted. Safety measures are in place. Will continue to monitor.

## 2022-06-05 NOTE — PROGRESS NOTES
SBAR report received from [facility], [facility]. Pt resting quietly in bed with wife at the bedside; A&O x4. Respirations even and unlabored on room air. Pt denies pain and reports no further needs at this time. D5 1/2 NS currently infusing at 75 mL/h. Safety measures are in place.

## 2022-06-05 NOTE — PROGRESS NOTES
Hospitalist Progress Note   Admit Date:  6/3/2022 11:20 PM   Name:  Rachael Fair   Age:  62 y.o. Sex:  male  :  1965   MRN:  [id number]   Room:  828/    Presenting Complaint: No chief complaint on file. Reason(s) for Admission: Diverticulitis of colon with perforation [facility] Course & Interval History:   62 y.o. male with past medical history of PE (s/p treatment  ), currently on no meds who presented to [facility] with complaints of rectal pressure and pain for the past 4 to 5 days. Last BM day PTA. on 6/3, had nausea, decreased appetite, chills, T max 100.6. Aching from the waist down. No vomiting. History of diverticulitis with anterior colon resection 14 years ago. Has had GI bleed in past from diverticulosis. No bleeding recently. In ER, he had low grade fever 100.1 and tachycardia. Rectal exam in ER revealed rectal tenderness but no masses felt. Imaging revealed diverticulitis with contained perforation     ER MD Discussed with surgery. They recommended downtown admission to hospitalist service as IR may be the one to intervene in this case. [facility] was started on metronidazole with Rocephin due to the patient's penicillin allergy.      Patient is admitted for diverticulitis with contained perforation. Pt placed on broad-spectrum antibiotics. General surgery and IR have been consulted. Subjective/24hr Events (22): Family has discussed with surgery and feels more comfortable about IR for drain placement. His family brought in Metamucil which patient takes daily. We will order but family can use one brought from home if they desire. Patient has been ambulating. No fever, chills, SOB, chest pain, abdominal pain or nausea or vomiting.     Assessment & Plan:     Diverticulitis of intestine with perforation  CTAP shows contained perforation of diverticulitis present at anastomotic site at the rectosigmoid junction  ABX: Change Rocephin to Cipro/Flagyl (6/4-...) Empirically  N.p.o. except meds and ice chips  IVF  Analgesics and antiemetics as needed  General surgery on board, appreciate recs  Consult IR for drain placement, plans for AM  NPO after midnight    Constipation  Colace. Add home Psyllium daily  Dulcolax prn    Dilated cardiomyopathy  EtOH induced (quit since 2015)  [address] [facility] cardiology outpatient    Hx of PE/DVT  Follows pulmonary outpatient. Had VTE after having covid infection  Finished with Xarelto 2 months ago per patient    Hx HTN  Normotensive w/o antihypertensives        Discharge Planning:      >2 midnights pending surgery, IR recs    Diet:  Diet NPO Exceptions are: Sips of Water with Meds, Sips of Clear Liquids, Ice Chips  Diet NPO  DVT PPx: heparin SQ  Code status: Full Code    Hospital Problems:  Principal Problem:    Diverticulitis of intestine with perforation  Active Problems:    Diverticulitis of colon with perforation  Resolved Problems:    * No resolved hospital problems. *      Objective:     Patient Vitals for the past 24 hrs:   Temp Pulse Resp BP SpO2   06/05/22 0719 98.1 °F (36.7 °C) 68 19 125/75 95 %   06/05/22 0358 98.5 °F (36.9 °C) 76 16 108/69 95 %   06/04/22 2233 98.8 °F (37.1 °C) 90 18 102/63 94 %   06/04/22 1955 98.2 °F (36.8 °C) 82 18 120/80 93 %   06/04/22 1509 98.1 °F (36.7 °C) 66 18 108/66 94 %   06/04/22 1113 98.6 °F (37 °C) 64 18 103/66 96 %       Oxygen Therapy  SpO2: 95 %  O2 Device: None (Room air)    Estimated body mass index is 28.89 kg/m² as calculated from the following:    Height as of this encounter: 5' 8\" (1.727 m). Weight as of this encounter: 190 lb (86.2 kg). Intake/Output Summary (Last 24 hours) at 6/5/2022 0911  Last data filed at 6/4/2022 1829  Gross per 24 hour   Intake 0 ml   Output --   Net 0 ml         Physical Exam:     Blood pressure 125/75, pulse 68, temperature 98.1 °F (36.7 °C), resp. rate 19, height 5' 8\" (1.727 m), weight 190 lb (86.2 kg), SpO2 95 %. General:    Well nourished. Head:  Normocephalic, atraumatic  Eyes:  Sclerae appear normal.  Pupils equally round. ENT:  Nares appear normal, no drainage. Moist oral mucosa  Neck:  No restricted ROM. Trachea midline   CV:   RRR. No m/r/g. No jugular venous distension. Lungs:   CTAB. No wheezing, rhonchi, or rales. Respirations even, unlabored  Abdomen: Bowel sounds present. Soft, nondistended, nontender  Extremities: No cyanosis or clubbing. No edema  Skin:     No rashes and normal coloration. Warm and dry. Neuro:  CN II-XII grossly intact. Sensation intact. A&Ox3  Psych:  Normal mood and affect.       I have reviewed ordered lab tests and independently visualized imaging below:    Recent Labs:  Recent Results (from the past 48 hour(s))   CBC with Diff    Collection Time: 06/03/22  5:58 PM   Result Value Ref Range    WBC 8.0 4.3 - 11.1 K/uL    RBC 4.53 4.23 - 5.60 M/uL    Hemoglobin 14.8 13.6 - 17.2 g/dL    Hematocrit 43.4 41.1 - 50.3 %    MCV 95.8 79.6 - 97.8 FL    MCH 32.7 26.1 - 32.9 PG    MCHC 34.1 31.4 - 35.0 g/dL    RDW 12.4 11.9 - 14.6 %    Platelets 431 431 - 735 K/uL    MPV 8.9 (L) 9.4 - 12.3 FL    nRBC 0.00 0.0 - 0.2 K/uL    Differential Type AUTOMATED      Seg Neutrophils 80 (H) 43 - 78 %    Lymphocytes 13 13 - 44 %    Monocytes 7 4.0 - 12.0 %    Eosinophils % 0 (L) 0.5 - 7.8 %    Basophils 0 0.0 - 2.0 %    Immature Granulocytes 0 0.0 - 5.0 %    Segs Absolute 6.3 1.7 - 8.2 K/UL    Absolute Lymph # 1.0 0.5 - 4.6 K/UL    Absolute Mono # 0.6 0.1 - 1.3 K/UL    Absolute Eos # 0.0 0.0 - 0.8 K/UL    Basophils Absolute 0.0 0.0 - 0.2 K/UL    Absolute Immature Granulocyte 0.0 0.0 - 0.5 K/UL   CMP    Collection Time: 06/03/22  5:58 PM   Result Value Ref Range    Sodium 138 136 - 145 mmol/L    Potassium 3.6 3.5 - 5.1 mmol/L    Chloride 103 98 - 107 mmol/L    CO2 22 21 - 32 mmol/L    Anion Gap 13 7.0 - 16.0 mmol/L    Glucose 105 (H) 65 - 100 mg/dL    BUN 9 7.0 - 18.0 MG/DL    CREATININE 0.69 (L) 0.8 - 1.5 MG/DL    GFR  American >152 >60 ml/min/1.73m2    GFR Non- >60 >60 ml/min/1.73m2    Calcium 9.1 8.3 - 10.4 MG/DL    Total Bilirubin 0.7 0.2 - 1.1 MG/DL    ALT 19 13.0 - 61.0 U/L    AST 17 15 - 37 U/L    Alk Phosphatase 47 45.0 - 117.0 U/L    Total Protein 6.8 6.4 - 8.2 g/dL    Albumin 3.9 3.5 - 5.0 g/dL    Globulin 2.9 2.3 - 3.5 g/dL    Albumin/Globulin Ratio 1.3     Lipase    Collection Time: 06/03/22  5:58 PM   Result Value Ref Range    Lipase 18 13 - 60 U/L   Urinalysis w rflx microscopic    Collection Time: 06/03/22  5:58 PM   Result Value Ref Range    Color, UA YELLOW      Appearance CLEAR      Specific Gravity, UA 1.015 1.001 - 1.023      pH, Urine 6.0 5.0 - 9.0      Protein, UA Negative NEG mg/dL    Glucose, UA Negative mg/dL    Ketones, Urine 40 (A) NEG mg/dL    Bilirubin Urine Negative NEG      Blood, Urine TRACE (A) NEG      Urobilinogen, Urine 0.2 0.2 - 1.0 EU/dL    Nitrite, Urine Negative NEG      Leukocyte Esterase, Urine Negative NEG     Urinalysis, Micro    Collection Time: 06/03/22  5:58 PM   Result Value Ref Range    WBC, UA 0-3 0 /hpf    RBC, UA 3-5 0 /hpf    Epithelial Cells UA 0 0 /hpf    BACTERIA, URINE 1+ (H) 0 /hpf    Casts 0 0 /lpf    Crystals 0 0 /LPF    Mucus, UA 1+ (H) 0 /lpf    OTHER OBSERVATIONS RESULTS VERIFIED MANUALLY     Basic Metabolic Panel w/ Reflex to MG    Collection Time: 06/04/22  3:43 AM   Result Value Ref Range    Sodium 138 136 - 145 mmol/L    Potassium 3.5 3.5 - 5.1 mmol/L    Chloride 107 98 - 107 mmol/L    CO2 25 21 - 32 mmol/L    Anion Gap 6 (L) 7 - 16 mmol/L    Glucose 118 (H) 65 - 100 mg/dL    BUN 9 6 - 23 MG/DL    CREATININE 0.70 (L) 0.8 - 1.5 MG/DL    GFR African American >60 >60 ml/min/1.73m2    GFR Non- >60 >60 ml/min/1.73m2    Calcium 8.5 8.3 - 10.4 MG/DL   CBC with Auto Differential    Collection Time: 06/04/22  3:43 AM   Result Value Ref Range    WBC 8.7 4.3 - 11.1 K/uL    RBC 4.21 (L) 4.23 - 5.6 M/uL    Hemoglobin 13.5 (L) 13.6 - 17.2 g/dL Hematocrit 40.6 (L) 41.1 - 50.3 %    MCV 96.4 79.6 - 97.8 FL    MCH 32.1 26.1 - 32.9 PG    MCHC 33.3 31.4 - 35.0 g/dL    RDW 12.8 11.9 - 14.6 %    Platelets 750 566 - 417 K/uL    MPV 9.6 9.4 - 12.3 FL    nRBC 0.00 0.0 - 0.2 K/uL    Differential Type AUTOMATED      Seg Neutrophils 88 (H) 43 - 78 %    Lymphocytes 7 (L) 13 - 44 %    Monocytes 4 4.0 - 12.0 %    Eosinophils % 0 (L) 0.5 - 7.8 %    Basophils 0 0.0 - 2.0 %    Immature Granulocytes 1 0.0 - 5.0 %    Segs Absolute 7.6 1.7 - 8.2 K/UL    Absolute Lymph # 0.6 0.5 - 4.6 K/UL    Absolute Mono # 0.4 0.1 - 1.3 K/UL    Absolute Eos # 0.0 0.0 - 0.8 K/UL    Basophils Absolute 0.0 0.0 - 0.2 K/UL    Absolute Immature Granulocyte 0.0 0.0 - 0.5 K/UL   Magnesium    Collection Time: 06/04/22  3:43 AM   Result Value Ref Range    Magnesium 2.1 1.8 - 2.4 mg/dL       Other Studies:  CT ABDOMEN PELVIS W IV CONTRAST Additional Contrast? Oral    Result Date: 6/3/2022  HISTORY:  abdominal pain, history of diverticulitis, fever. Rectal pressure, 4-5 days duration. COMPARISON: 9/23/2020 EXAM: CT abdomen and pelvis with iv contrast TECHNIQUE: Thin section axial CT was performed from the lung bases through the symphysis pubis during uneventful rapid bolus intravenous administration of 125 mL of Isovue 370. Oral contrast was also administered. Radiation dose reduction techniques were used for this study. Our CT scanners use one or all of the following: Automated exposure control, adjustment of the mA and/or kV according to patient size, use of iterative reconstruction. FINDINGS: There is a small right middle lobe subpleural nodule measuring 3 mm (image 1). There is also a hiatal hernia present. CT abdomen: The liver and spleen enhances homogeneously without discrete lesions. There is no biliary ductal dilatation. The gallbladder, pancreas and adrenal glands are normal. The kidneys enhance symmetrically. There is a cyst within the lower pole the right kidney.  Bowel loops in the upper abdomen are normal. No definite upper abdominal lymphadenopathy seen. CT pelvis: The appendix is normal. There is marked pericolonic fat stranding at the rectosigmoid junction, at the site of prior anastomosis. There is an air and fluid collection at this level measuring 3.3 x 2.8 cm. The bladder and rectum are normal. No pelvic adenopathy seen. No free air or free fluid seen within the pelvis. Bone window evaluation demonstrates no aggressive osseous lesions. Findings compatible with contained perforation of diverticulitis.  This is present at the anastomotic site at the rectosigmoid junction       Current Meds:  Current Facility-Administered Medications   Medication Dose Route Frequency    HYDROcodone-acetaminophen (NORCO) 5-325 MG per tablet 1 tablet  1 tablet Oral Q6H PRN    ciprofloxacin (CIPRO) IVPB 400 mg  400 mg IntraVENous Q12H    docusate sodium (COLACE) capsule 100 mg  100 mg Oral Daily    bisacodyl (DULCOLAX) EC tablet 5 mg  5 mg Oral Daily PRN    heparin (porcine) injection 5,000 Units  5,000 Units SubCUTAneous 3 times per day    sodium chloride flush 0.9 % injection 5-40 mL  5-40 mL IntraVENous 2 times per day    sodium chloride flush 0.9 % injection 5-40 mL  5-40 mL IntraVENous PRN    0.9 % sodium chloride infusion   IntraVENous PRN    ondansetron (ZOFRAN-ODT) disintegrating tablet 4 mg  4 mg Oral Q8H PRN    Or    ondansetron (ZOFRAN) injection 4 mg  4 mg IntraVENous Q6H PRN    polyethylene glycol (GLYCOLAX) packet 17 g  17 g Oral Daily PRN    aluminum & magnesium hydroxide-simethicone (MAALOX) 200-200-20 MG/5ML suspension 30 mL  30 mL Oral Q6H PRN    acetaminophen (TYLENOL) tablet 650 mg  650 mg Oral Q6H PRN    Or    acetaminophen (TYLENOL) suppository 650 mg  650 mg Rectal Q6H PRN    dextrose 5 % and 0.45 % sodium chloride infusion   IntraVENous Continuous    metronidazole (FLAGYL) 500 mg in 0.9% NaCl 100 mL IVPB premix  500 mg IntraVENous Q12H    morphine injection 2 mg  2 mg IntraVENous Q4H PRN       Signed: Ernesto Kowalski DO    Part of this note may have been written by using a voice dictation software. The note has been proof read but may still contain some grammatical/other typographical errors.

## 2022-06-05 NOTE — PROGRESS NOTES
Patient stable with no complaints at this time. Patient resting in bed with wife at bedside for support. Patient denies any pain and appears comfortable at this time. Call light within reach and patient instructed to call if assistance is needed.   Report to be given to oncoming RN 7p-7a

## 2022-06-06 LAB
ANION GAP SERPL CALC-SCNC: 7 MMOL/L (ref 7–16)
BUN SERPL-MCNC: 7 MG/DL (ref 6–23)
CALCIUM SERPL-MCNC: 8.5 MG/DL (ref 8.3–10.4)
CHLORIDE SERPL-SCNC: 109 MMOL/L (ref 98–107)
CO2 SERPL-SCNC: 26 MMOL/L (ref 21–32)
CREAT SERPL-MCNC: 0.6 MG/DL (ref 0.8–1.5)
ERYTHROCYTE [DISTWIDTH] IN BLOOD BY AUTOMATED COUNT: 12.4 % (ref 11.9–14.6)
GLUCOSE SERPL-MCNC: 112 MG/DL (ref 65–100)
HCT VFR BLD AUTO: 41.6 % (ref 41.1–50.3)
HGB BLD-MCNC: 14 G/DL (ref 13.6–17.2)
MCH RBC QN AUTO: 32.4 PG (ref 26.1–32.9)
MCHC RBC AUTO-ENTMCNC: 33.7 G/DL (ref 31.4–35)
MCV RBC AUTO: 96.3 FL (ref 79.6–97.8)
NRBC # BLD: 0 K/UL (ref 0–0.2)
PLATELET # BLD AUTO: 187 K/UL (ref 150–450)
PMV BLD AUTO: 9.2 FL (ref 9.4–12.3)
POTASSIUM SERPL-SCNC: 3.3 MMOL/L (ref 3.5–5.1)
RBC # BLD AUTO: 4.32 M/UL (ref 4.23–5.6)
SODIUM SERPL-SCNC: 142 MMOL/L (ref 138–145)
WBC # BLD AUTO: 4.5 K/UL (ref 4.3–11.1)

## 2022-06-06 PROCEDURE — 2580000003 HC RX 258: Performed by: HOSPITALIST

## 2022-06-06 PROCEDURE — 6360000002 HC RX W HCPCS: Performed by: FAMILY MEDICINE

## 2022-06-06 PROCEDURE — 2500000003 HC RX 250 WO HCPCS: Performed by: HOSPITALIST

## 2022-06-06 PROCEDURE — 6370000000 HC RX 637 (ALT 250 FOR IP): Performed by: FAMILY MEDICINE

## 2022-06-06 PROCEDURE — 1100000000 HC RM PRIVATE

## 2022-06-06 PROCEDURE — 6370000000 HC RX 637 (ALT 250 FOR IP): Performed by: HOSPITALIST

## 2022-06-06 PROCEDURE — 36415 COLL VENOUS BLD VENIPUNCTURE: CPT

## 2022-06-06 PROCEDURE — 80048 BASIC METABOLIC PNL TOTAL CA: CPT

## 2022-06-06 PROCEDURE — 85027 COMPLETE CBC AUTOMATED: CPT

## 2022-06-06 RX ORDER — POTASSIUM CHLORIDE 20 MEQ/1
40 TABLET, EXTENDED RELEASE ORAL PRN
Status: DISCONTINUED | OUTPATIENT
Start: 2022-06-06 | End: 2022-06-07 | Stop reason: HOSPADM

## 2022-06-06 RX ORDER — MAGNESIUM SULFATE IN WATER 40 MG/ML
2000 INJECTION, SOLUTION INTRAVENOUS PRN
Status: DISCONTINUED | OUTPATIENT
Start: 2022-06-06 | End: 2022-06-07 | Stop reason: HOSPADM

## 2022-06-06 RX ORDER — POTASSIUM CHLORIDE 7.45 MG/ML
10 INJECTION INTRAVENOUS PRN
Status: DISCONTINUED | OUTPATIENT
Start: 2022-06-06 | End: 2022-06-06

## 2022-06-06 RX ORDER — POTASSIUM CHLORIDE 20 MEQ/1
20 TABLET, EXTENDED RELEASE ORAL 2 TIMES DAILY WITH MEALS
Status: DISCONTINUED | OUTPATIENT
Start: 2022-06-06 | End: 2022-06-07 | Stop reason: HOSPADM

## 2022-06-06 RX ORDER — POTASSIUM CHLORIDE 7.45 MG/ML
10 INJECTION INTRAVENOUS PRN
Status: DISCONTINUED | OUTPATIENT
Start: 2022-06-06 | End: 2022-06-07 | Stop reason: HOSPADM

## 2022-06-06 RX ADMIN — METRONIDAZOLE 500 MG: 500 INJECTION, SOLUTION INTRAVENOUS at 00:09

## 2022-06-06 RX ADMIN — ACETAMINOPHEN 650 MG: 325 TABLET ORAL at 10:01

## 2022-06-06 RX ADMIN — DOCUSATE SODIUM 100 MG: 100 CAPSULE, LIQUID FILLED ORAL at 14:21

## 2022-06-06 RX ADMIN — CIPROFLOXACIN 400 MG: 2 INJECTION, SOLUTION INTRAVENOUS at 05:08

## 2022-06-06 RX ADMIN — POTASSIUM CHLORIDE 10 MEQ: 7.46 INJECTION, SOLUTION INTRAVENOUS at 06:23

## 2022-06-06 RX ADMIN — POTASSIUM CHLORIDE 10 MEQ: 7.46 INJECTION, SOLUTION INTRAVENOUS at 11:12

## 2022-06-06 RX ADMIN — METRONIDAZOLE 500 MG: 500 INJECTION, SOLUTION INTRAVENOUS at 12:33

## 2022-06-06 RX ADMIN — POTASSIUM CHLORIDE 10 MEQ: 7.46 INJECTION, SOLUTION INTRAVENOUS at 09:34

## 2022-06-06 RX ADMIN — DEXTROSE AND SODIUM CHLORIDE: 5; 450 INJECTION, SOLUTION INTRAVENOUS at 00:10

## 2022-06-06 RX ADMIN — POTASSIUM CHLORIDE 10 MEQ: 7.46 INJECTION, SOLUTION INTRAVENOUS at 08:08

## 2022-06-06 RX ADMIN — SODIUM CHLORIDE, PRESERVATIVE FREE 10 ML: 5 INJECTION INTRAVENOUS at 21:00

## 2022-06-06 RX ADMIN — CIPROFLOXACIN 400 MG: 2 INJECTION, SOLUTION INTRAVENOUS at 17:01

## 2022-06-06 ASSESSMENT — PAIN DESCRIPTION - FREQUENCY: FREQUENCY: CONTINUOUS

## 2022-06-06 ASSESSMENT — PAIN - FUNCTIONAL ASSESSMENT: PAIN_FUNCTIONAL_ASSESSMENT: ACTIVITIES ARE NOT PREVENTED

## 2022-06-06 ASSESSMENT — PAIN SCALES - GENERAL
PAINLEVEL_OUTOF10: 0
PAINLEVEL_OUTOF10: 4
PAINLEVEL_OUTOF10: 0

## 2022-06-06 ASSESSMENT — PAIN DESCRIPTION - LOCATION: LOCATION: HEAD

## 2022-06-06 ASSESSMENT — PAIN DESCRIPTION - PAIN TYPE: TYPE: ACUTE PAIN

## 2022-06-06 ASSESSMENT — PAIN DESCRIPTION - ONSET: ONSET: ON-GOING

## 2022-06-06 ASSESSMENT — PAIN DESCRIPTION - DESCRIPTORS: DESCRIPTORS: ACHING

## 2022-06-06 ASSESSMENT — PAIN DESCRIPTION - ORIENTATION: ORIENTATION: MID

## 2022-06-06 NOTE — PROGRESS NOTES
Hospitalist Progress Note   Admit Date:  6/3/2022 11:20 PM   Name:  Laney Al   Age:  62 y.o. Sex:  male  :  1965   MRN:  [id number]   Room:  828/    Presenting Complaint: No chief complaint on file. Reason(s) for Admission: Diverticulitis of colon with perforation [facility] Course & Interval History:   62 y.o. male with past medical history of PE (s/p treatment  ), currently on no meds who presented to [facility] ER with complaints of rectal pressure and pain for the past 4 to 5 days. Last BM day PTA. on 6/3, had nausea, decreased appetite, chills, T max 100.6. Aching from the waist down. No vomiting. History of diverticulitis with anterior colon resection 14 years ago. Has had GI bleed in past from diverticulosis. No bleeding recently. In ER, he had low grade fever 100.1 and tachycardia. Rectal exam in ER revealed rectal tenderness but no masses felt. Imaging revealed diverticulitis with contained perforation     ER MD Discussed with surgery. They recommended downtown admission to hospitalist service as IR may be the one to intervene in this case. [facility] was started on metronidazole with Rocephin due to the patient's penicillin allergy.      Patient is admitted for diverticulitis with contained perforation. Pt placed on broad-spectrum antibiotics. General surgery and IR have been consulted. Subjective/24hr Events (22): Patient was his wife, mother and brother at bedside. Would like to speak to IR about procedure. No complaints. No fever, chills, SOB, chest pain, abdominal pain or nausea or vomiting. Assessment & Plan:     Diverticulitis of intestine with perforation  CTAP shows contained perforation of diverticulitis present at anastomotic site at the rectosigmoid junction  ABX: Change Rocephin to Cipro/Flagyl (-. ..) Empirically  N.p.o. except meds and ice chips  IVF  Analgesics and antiemetics as needed  General surgery on board, appreciate recs  Consult IR for drain placement, plans for today 6/6    Constipation  Colace. Add home Psyllium daily  Dulcolax prn    Dilated cardiomyopathy  EtOH induced (quit since 2015)  [address] [facility] cardiology outpatient    Hx of PE/DVT  Follows pulmonary outpatient. Had VTE after having covid infection  Finished with Xarelto 2 months ago per patient    Hx HTN  Normotensive w/o antihypertensives        Discharge Planning:      >2 midnights pending surgery, IR recs    Diet:  Diet NPO  DVT PPx: heparin SQ  Code status: Full Code    Hospital Problems:  Principal Problem:    Diverticulitis of intestine with perforation  Active Problems:    Diverticulitis of colon with perforation  Resolved Problems:    * No resolved hospital problems. *      Objective:     Patient Vitals for the past 24 hrs:   Temp Pulse Resp BP SpO2   06/06/22 0759 98.1 °F (36.7 °C) 68 20 117/76 96 %   06/06/22 0508 98.1 °F (36.7 °C) 72 20 116/79 92 %   06/05/22 2331 97.7 °F (36.5 °C) 69 20 120/82 98 %   06/05/22 1946 98.2 °F (36.8 °C) 75 20 111/73 93 %   06/05/22 1522 97.5 °F (36.4 °C) 78 19 122/82 97 %       Oxygen Therapy  SpO2: 96 %  O2 Device: None (Room air)    Estimated body mass index is 28.89 kg/m² as calculated from the following:    Height as of this encounter: 5' 8\" (1.727 m). Weight as of this encounter: 190 lb (86.2 kg). Intake/Output Summary (Last 24 hours) at 6/6/2022 1143  Last data filed at 6/6/2022 1033  Gross per 24 hour   Intake 60 ml   Output 0 ml   Net 60 ml         Physical Exam:     Blood pressure 117/76, pulse 68, temperature 98.1 °F (36.7 °C), temperature source Oral, resp. rate 20, height 5' 8\" (1.727 m), weight 190 lb (86.2 kg), SpO2 96 %. General:    Well nourished. Head:  Normocephalic, atraumatic  Eyes:  Sclerae appear normal.  Pupils equally round. ENT:  Nares appear normal, no drainage. Moist oral mucosa  Neck:  No restricted ROM. Trachea midline   CV:   RRR. No m/r/g. No jugular venous distension. Lungs:   CTAB. No wheezing, rhonchi, or rales. Respirations even, unlabored  Abdomen: Bowel sounds present. Soft, nondistended, nontender  Extremities: No cyanosis or clubbing. No edema  Skin:     No rashes and normal coloration. Warm and dry. Neuro:  CN II-XII grossly intact. Sensation intact. A&Ox3  Psych:  Normal mood and affect. I have reviewed ordered lab tests and independently visualized imaging below:    Recent Labs:  Recent Results (from the past 48 hour(s))   CBC    Collection Time: 06/06/22  3:43 AM   Result Value Ref Range    WBC 4.5 4.3 - 11.1 K/uL    RBC 4.32 4.23 - 5.6 M/uL    Hemoglobin 14.0 13.6 - 17.2 g/dL    Hematocrit 41.6 41.1 - 50.3 %    MCV 96.3 79.6 - 97.8 FL    MCH 32.4 26.1 - 32.9 PG    MCHC 33.7 31.4 - 35.0 g/dL    RDW 12.4 11.9 - 14.6 %    Platelets 944 970 - 224 K/uL    MPV 9.2 (L) 9.4 - 12.3 FL    nRBC 0.00 0.0 - 0.2 K/uL   Basic Metabolic Panel    Collection Time: 06/06/22  3:43 AM   Result Value Ref Range    Sodium 142 138 - 145 mmol/L    Potassium 3.3 (L) 3.5 - 5.1 mmol/L    Chloride 109 (H) 98 - 107 mmol/L    CO2 26 21 - 32 mmol/L    Anion Gap 7 7 - 16 mmol/L    Glucose 112 (H) 65 - 100 mg/dL    BUN 7 6 - 23 MG/DL    CREATININE 0.60 (L) 0.8 - 1.5 MG/DL    GFR African American >60 >60 ml/min/1.73m2    GFR Non- >60 >60 ml/min/1.73m2    Calcium 8.5 8.3 - 10.4 MG/DL       Other Studies:  CT ABDOMEN PELVIS W IV CONTRAST Additional Contrast? Oral    Result Date: 6/3/2022  HISTORY:  abdominal pain, history of diverticulitis, fever. Rectal pressure, 4-5 days duration. COMPARISON: 9/23/2020 EXAM: CT abdomen and pelvis with iv contrast TECHNIQUE: Thin section axial CT was performed from the lung bases through the symphysis pubis during uneventful rapid bolus intravenous administration of 125 mL of Isovue 370. Oral contrast was also administered. Radiation dose reduction techniques were used for this study.   Our CT scanners use one or all of the following: Automated exposure control, adjustment of the mA and/or kV according to patient size, use of iterative reconstruction. FINDINGS: There is a small right middle lobe subpleural nodule measuring 3 mm (image 1). There is also a hiatal hernia present. CT abdomen: The liver and spleen enhances homogeneously without discrete lesions. There is no biliary ductal dilatation. The gallbladder, pancreas and adrenal glands are normal. The kidneys enhance symmetrically. There is a cyst within the lower pole the right kidney. Bowel loops in the upper abdomen are normal. No definite upper abdominal lymphadenopathy seen. CT pelvis: The appendix is normal. There is marked pericolonic fat stranding at the rectosigmoid junction, at the site of prior anastomosis. There is an air and fluid collection at this level measuring 3.3 x 2.8 cm. The bladder and rectum are normal. No pelvic adenopathy seen. No free air or free fluid seen within the pelvis. Bone window evaluation demonstrates no aggressive osseous lesions. Findings compatible with contained perforation of diverticulitis.  This is present at the anastomotic site at the rectosigmoid junction       Current Meds:  Current Facility-Administered Medications   Medication Dose Route Frequency    potassium chloride 10 mEq/100 mL IVPB (Peripheral Line)  10 mEq IntraVENous PRN    psyllium (METAMUCIL) 58.12 % packet 1 packet  1 packet Oral Daily    HYDROcodone-acetaminophen (NORCO) 5-325 MG per tablet 1 tablet  1 tablet Oral Q6H PRN    ciprofloxacin (CIPRO) IVPB 400 mg  400 mg IntraVENous Q12H    docusate sodium (COLACE) capsule 100 mg  100 mg Oral Daily    bisacodyl (DULCOLAX) EC tablet 5 mg  5 mg Oral Daily PRN    heparin (porcine) injection 5,000 Units  5,000 Units SubCUTAneous 3 times per day    sodium chloride flush 0.9 % injection 5-40 mL  5-40 mL IntraVENous 2 times per day    sodium chloride flush 0.9 % injection 5-40 mL  5-40 mL IntraVENous PRN    0.9 % sodium chloride infusion IntraVENous PRN    ondansetron (ZOFRAN-ODT) disintegrating tablet 4 mg  4 mg Oral Q8H PRN    Or    ondansetron (ZOFRAN) injection 4 mg  4 mg IntraVENous Q6H PRN    aluminum & magnesium hydroxide-simethicone (MAALOX) 200-200-20 MG/5ML suspension 30 mL  30 mL Oral Q6H PRN    acetaminophen (TYLENOL) tablet 650 mg  650 mg Oral Q6H PRN    Or    acetaminophen (TYLENOL) suppository 650 mg  650 mg Rectal Q6H PRN    dextrose 5 % and 0.45 % sodium chloride infusion   IntraVENous Continuous    metronidazole (FLAGYL) 500 mg in 0.9% NaCl 100 mL IVPB premix  500 mg IntraVENous Q12H    morphine injection 2 mg  2 mg IntraVENous Q4H PRN       Signed: Keyona Kitchen DO    Part of this note may have been written by using a voice dictation software. The note has been proof read but may still contain some grammatical/other typographical errors.

## 2022-06-06 NOTE — PLAN OF CARE
Problem: Discharge Planning  Goal: Discharge to home or other facility with appropriate resources  Outcome: Progressing  Flowsheets (Taken 6/6/2022 0730)  Discharge to home or other facility with appropriate resources: Identify barriers to discharge with patient and caregiver     Problem: Pain  Goal: Verbalizes/displays adequate comfort level or baseline comfort level  Outcome: Progressing     Problem: Safety - Adult  Goal: Free from fall injury  Outcome: Progressing  Flowsheets (Taken 6/6/2022 0730)  Free From Fall Injury: Instruct family/caregiver on patient safety     Problem: Skin/Tissue Integrity  Goal: Absence of new skin breakdown  Description: 1. Monitor for areas of redness and/or skin breakdown  2. Assess vascular access sites hourly  3. Every 4-6 hours minimum:  Change oxygen saturation probe site  4. Every 4-6 hours:  If on nasal continuous positive airway pressure, respiratory therapy assess nares and determine need for appliance change or resting period.   Outcome: Progressing

## 2022-06-06 NOTE — PROGRESS NOTES
Pt is A&Ox4. All VSS on RA. Pt c/o 4/10 headache pain, medicated w PRN Tylenol w adequate pain control. Reports abd is tender but declines medication at this time. Pt is up OOB independently in room. Pt is voiding adequately and passing gas. Pt reports 1x brown formed BM this shift. Diet advanced to clears w no plan for IR procedure. D5NS cont at 50cc/ hr. Supplemental IV K+ administered per protocol. Q1h safety checks cont, will cont to monitor.

## 2022-06-06 NOTE — PROGRESS NOTES
H&P/Consult Note/Progress Note/Office Note:   Elroy De Santiago  MRN: [id number]  :1965  Age:57 y.o.    s: Elroy De Santiago is a 62 y.o. No fevers or chills  \"presure in my rectum\"  Some flatus and mucous small  BM             Past Medical History:   Diagnosis Date    Alcoholism /alcohol abuse     Anxiety     Cardiomyopathy ([address] 2016    Diverticular disease     colon resection with no symptoms since surgery    Essential hypertension     managed with med.  GERD (gastroesophageal reflux disease)     takes medication but not altogether relieved    GI bleed 2/15/2020    Hiatal hernia with gastroesophageal reflux 2013    Hypertension     managed with med.       Past Surgical History:   Procedure Laterality Date    COLONOSCOPY  2020     2 in 2020    COLONOSCOPY  3/2018,     10 years    COLONOSCOPY N/A 2020    COLONOSCOPY // Room 630 performed by Nate Thomas MD at [place] [address] COLONOSCOPY N/A 2020    COLONOSCOPY ROOM 517 performed by [facility]lee Ritter [facility] at [facility] ENDOSCOPY    CYST REMOVAL      renal cyst    HERNIA REPAIR      Surendra fundiplication    TOTAL COLECTOMY  2008     Current Facility-Administered Medications   Medication Dose Route Frequency    potassium chloride (KLOR-CON M) extended release tablet 40 mEq  40 mEq Oral PRN    Or    potassium bicarb-citric acid (EFFER-K) effervescent tablet 40 mEq  40 mEq Oral PRN    Or    potassium chloride 10 mEq/100 mL IVPB (Peripheral Line)  10 mEq IntraVENous PRN    magnesium sulfate 2000 mg in 50 mL IVPB premix  2,000 mg IntraVENous PRN    potassium chloride (KLOR-CON M) extended release tablet 20 mEq  20 mEq Oral BID WC    psyllium (METAMUCIL) 58.12 % packet 1 packet  1 packet Oral Daily    HYDROcodone-acetaminophen (NORCO) 5-325 MG per tablet 1 tablet  1 tablet Oral Q6H PRN    ciprofloxacin (CIPRO) IVPB 400 mg  400 mg IntraVENous Q12H    docusate sodium (COLACE) capsule 100 mg  100 mg Oral Daily    Transportation (Non-Medical): Not on file   Physical Activity:     Days of Exercise per Week: Not on file    Minutes of Exercise per Session: Not on file   Stress:     Feeling of Stress : Not on file   Social Connections:     Frequency of Communication with Friends and Family: Not on file    Frequency of Social Gatherings with Friends and Family: Not on file    Attends [employer] Services: Not on file    Active Member of [address] [place] [address] or Organizations: Not on file    Attends Club or Organization Meetings: Not on file    Marital Status: Not on file   Intimate Partner Violence:     Fear of Current or Ex-Partner: Not on file    Emotionally Abused: Not on file    Physically Abused: Not on file    Sexually Abused: Not on file   Housing Stability:     Unable to Pay for Housing in the Last Year: Not on file    Number of [place] in the Last Year: Not on file    Unstable Housing in the Last Year: Not on file     Social History     Tobacco Use   Smoking Status Former Smoker    Quit date: 2015    Years since quittin.5   Smokeless Tobacco Never Used     Family History   Problem Relation Age of Onset    No Known Problems Brother     Cancer Father         brain    Breast Cancer Mother      ROS: The patient has no difficulty with chest pain or shortness of breath. No fever or chills. Comprehensive review of systems was otherwise unremarkable except as noted above.     Physical Exam:   /76   Pulse 68   Temp 98.1 °F (36.7 °C) (Oral)   Resp 20   Ht 5' 8\" (1.727 m)   Wt 190 lb (86.2 kg)   SpO2 96%   BMI 28.89 kg/m²   Vitals:    22 1946 22 2331 22 0508 22 0759   BP: 111/73 120/82 116/79 117/76   Pulse: 75 69 72 68   Resp: 20 20 20 20   Temp: 98.2 °F (36.8 °C) 97.7 °F (36.5 °C) 98.1 °F (36.7 °C) 98.1 °F (36.7 °C)   TempSrc: Oral Oral Oral Oral   SpO2: 93% 98% 92% 96%   Weight:       Height:          0701 -  1900  In: 60 [P.O.:60]  Out: 0   No intake/output data recorded. Constitutional: Alert, oriented, cooperative patient in no acute distress; appears stated age    Eyes:Sclera are clear. EOMs intact  ENMT: no external lesions gross hearing normal; no obvious neck masses, no ear or lip lesions, nares normal  CV: RRR. Normal perfusion  Resp: No JVD. Breathing is  non-labored; no audible wheezing. GI: soft and non-distended , tender in suprapubic area, no peritonitis    Musculoskeletal: unremarkable with normal function. No embolic signs or cyanosis. Neuro:  Oriented; moves all 4; no focal deficits  Psychiatric: normal affect and mood, no memory impairment    Recent vitals (if inpt):  Patient Vitals for the past 24 hrs:   BP Temp Temp src Pulse Resp SpO2   06/06/22 0759 117/76 98.1 °F (36.7 °C) Oral 68 20 96 %   06/06/22 0508 116/79 98.1 °F (36.7 °C) Oral 72 20 92 %   06/05/22 2331 120/82 97.7 °F (36.5 °C) Oral 69 20 98 %   06/05/22 1946 111/73 98.2 °F (36.8 °C) Oral 75 20 93 %   06/05/22 1522 122/82 97.5 °F (36.4 °C) -- 78 19 97 %       Amount and/or Complexity of Data Reviewed and Analyzed:  I reviewed and analyzed all of the unique labs and radiologic studies that are shown below as well as any that are in the HPI, and any that are in the expanded problem list below  *Each unique test, order, or document contributes to the combination of 2 or combination of 3 in Category 1 below. For this visit I also reviewed old records and prior notes. Recent Labs     06/03/22  1758 06/04/22  0343 06/06/22  0343   WBC 8.0   < > 4.5   HGB 14.8   < > 14.0      < > 187      < > 142   K 3.6   < > 3.3*      < > 109*   CO2 22   < > 26   BUN 9   < > 7   ALT 19  --   --     < > = values in this interval not displayed.      Review of most recent CBC  Lab Results   Component Value Date    WBC 4.5 06/06/2022    HGB 14.0 06/06/2022    HCT 41.6 06/06/2022    MCV 96.3 06/06/2022     06/06/2022       Review of most recent BMP  Lab Results   Component Value Date the  following: Automated exposure control, adjustment of the mA and/or kV according  to patient size, use of iterative reconstruction. FINDINGS:    There is a small right middle lobe subpleural nodule measuring 3 mm (image 1). There is also a hiatal hernia present. CT abdomen: The liver and spleen enhances homogeneously without discrete  lesions. There is no biliary ductal dilatation. The gallbladder, pancreas and  adrenal glands are normal. The kidneys enhance symmetrically. There is a cyst  within the lower pole the right kidney. Bowel loops in the upper abdomen are  normal. No definite upper abdominal lymphadenopathy seen. CT pelvis: The appendix is normal. There is marked pericolonic fat stranding at  the rectosigmoid junction, at the site of prior anastomosis. There is an air and  fluid collection at this level measuring 3.3 x 2.8 cm. The bladder and rectum  are normal. No pelvic adenopathy seen. No free air or free fluid seen within the  pelvis. Bone window evaluation demonstrates no aggressive osseous lesions. Impression  Findings compatible with contained perforation of diverticulitis. This is  present at the anastomotic site at the rectosigmoid junction    US Result (most recent):  [facility]    Narrative  Bilateral lower extremity venous ultrasound    INDICATION:  Pain and swelling, pulmonary embolus    Doppler ultrasound of both lower extremities was performed. FINDINGS:  There is normal flow in the greater saphenous, common femoral,  superficial femoral, and popliteal veins. Normal compression and augmentation is  demonstrated. The proximal right calf and are patent. There is thrombus within the peroneal  vein in the mid left calf. No fluid collections are seen in either leg. Impression  1. Thrombosis of the mid left peroneal vein below the level of the knee. 2.  No evidence of DVT in the right leg.       Admission date (for inpatients): 6/3/2022           ASSESSMENT/PLAN:    Principal Problem:    Diverticulitis of intestine with perforation  Active Problems:    Diverticulitis of colon with perforation  Resolved Problems:    * No resolved hospital problems. *     Patient Active Problem List    Diagnosis Date Noted    Diverticulitis of intestine with perforation 06/03/2022     Priority: Medium    Diverticulitis of colon with perforation 06/03/2022     Priority: Medium    AISHA (obstructive sleep apnea) 08/20/2021    Chronic deep vein thrombosis (DVT) of tibial vein of left lower extremity ([address] 04/08/2021    Nocturnal hypoxia 04/08/2021    Anxiety     Suspected sleep apnea 04/07/2021    History of GI bleed 04/06/2021    Pulmonary embolism, bilateral ([address] 04/06/2021    Exertional dyspnea 03/17/2021    Diverticulosis 02/15/2020    PVCs (premature ventricular contractions) 12/19/2018    GERD (gastroesophageal reflux disease) 12/19/2018    Recurrent depression ([address] 12/20/2017    Essential hypertension with goal blood pressure less than 130/85 01/19/2017    Myocardiopathy ([address] 06/30/2016          A/P    Suspected recurrent descending colon diverticulitis with contained perforation- with IR consult today noted that the area on CT of concern has been present since at least 2014- and is a diverticula or \"knuckle\" adjacent to the staple line. It is now larger and surrounded by inflammation without a discrete abscess. Continue current plan of care with   -IV antibiotics  -CLD     Dr Arlet Haines to review all CTs and provide further recommendations tomorrow. Family and patient aware of plan.

## 2022-06-06 NOTE — CARE COORDINATION
06/06/22 0933   Service Assessment   Patient Orientation Alert and Oriented   Cognition Alert   Primary Caregiver Self   PCP Verified by CM Yes   Prior Functional Level Independent in ADLs/IADLs   Can patient return to prior living arrangement Yes   Ability to make needs known: Good   Family able to assist with home care needs: Yes   Social/Functional History   Lives With Spouse   Mode of Transportation Car   Occupation Full time employment   Patient is independent with ambulation and ADLs. Employed full time. Drives. DME: none  No history of HH or rehab. CM is not anticipating any discharge needs but remains available.

## 2022-06-06 NOTE — PROGRESS NOTES
Pt resting quietly in bed with wife at the bedside; A&O x4. Respirations even and unlabored on room air. Pt denies pain and reports no further needs at this time. D5 1/2 NS currently infusing at 75 mL/h. Potassium infusion started for potassium of 3.3. Bag 1 of 4 started at 0623 per orders in [facility]. No signs of distress noted. Pt remains NPO. Safety measures are in place. Will continue to monitor and give report to oncoming RN.

## 2022-06-06 NOTE — CONSULTS
Department of Interventional Radiology  [phone]    Consult--NO charge    Patient:  Ludwin Lopez MRN:  [id number]  SSN:  [id number]    YOB: 1965  Age:  62 y.o. Sex:  male      Primary Care Provider:  Raza Guy MD  Referring HCW:  Zachary Yeh         CT scans reviewed:  6/3/22, 9/23/20, 2/15/20, 7/24/16, 5/1/14    There is an oral contrast containing diverticula or knuckle of colon adjacent to the staple line that has been present since at least 2014. This \"knuckle\" is now larger and surrounded by inflammation, likely obstructed at the connection to the flow lumen of the recto-sigmoid colon. There is no discrete abscess. No role for percutaneous drainage as this would result in the drain being intra-luminal.     Carmen Sow MD              Past Medical History:   Diagnosis Date    Alcoholism /alcohol abuse     Anxiety     Cardiomyopathy ([address] 6/30/2016    Diverticular disease     colon resection with no symptoms since surgery    Essential hypertension     managed with med.  GERD (gastroesophageal reflux disease)     takes medication but not altogether relieved    GI bleed 2/15/2020    Hiatal hernia with gastroesophageal reflux 2/5/2013    Hypertension     managed with med. Past Surgical History:   Procedure Laterality Date    COLONOSCOPY  2020     2 in 2020    COLONOSCOPY  3/2018, 2008    10 years    COLONOSCOPY N/A 9/24/2020    COLONOSCOPY /29/ Room 630 performed by Ghanshyam Bright MD at [facility] ENDOSCOPY    COLONOSCOPY N/A 2/24/2020    COLONOSCOPY ROOM 517 performed by William Chang MD at [address] [place] [address],[address]  2017    renal cyst    HERNIA REPAIR      Surendra fundiplication    TOTAL COLECTOMY  4/2008        Prior to Admission medications    Medication Sig Start Date End Date Taking?  Authorizing Provider   ascorbic acid (VITAMIN C) 1000 MG tablet Take 1,000 mg by mouth     Ar Automatic Reconciliation   cyanocobalamin 1000 MCG tablet Take 1,000 mcg by mouth daily  Patient not taking: Reported on 6/3/2022    Ar Automatic Reconciliation   rivaroxaban (XARELTO) 20 MG TABS tablet Take 20 mg by mouth Daily with supper  Patient not taking: Reported on 6/3/2022 4/8/21   Ar Automatic Reconciliation        Allergies   Allergen Reactions    Penicillins Hives       Family History   Problem Relation Age of Onset    No Known Problems Brother     Cancer Father         brain    Breast Cancer Mother      Social History     Tobacco Use    Smoking status: Former Smoker     Quit date: 2015     Years since quittin.5    Smokeless tobacco: Never Used   Substance Use Topics    Alcohol use: No     Alcohol/week: 0.0 standard drinks        Objective:       Physical Examination:    Vitals:    22 1946 22 2331 22 0508 22 0759   BP: 111/73 120/82 116/79 117/76   Pulse: 75 69 72 68   Resp: 20 20 20 20   Temp: 98.2 °F (36.8 °C) 97.7 °F (36.5 °C) 98.1 °F (36.7 °C) 98.1 °F (36.7 °C)   TempSrc: Oral Oral Oral Oral   SpO2: 93% 98% 92% 96%   Weight:       Height:           Laboratory:     Lab Results   Component Value Date     2022     2022    K 3.3 2022    K 3.5 2022     2022     2022    CO2 26 2022    CO2 25 2022    BUN 7 2022    BUN 9 2022    GFRAA >60 2022    GFRAA >60 2022    MG 2.1 2022    MG 2.0 2021    PHOS 3.3 2020    GLOB 2.9 2022    GLOB 3.3 2021    ALT 19 2022    ALT 21 2021     Lab Results   Component Value Date    WBC 4.5 2022    WBC 8.7 2022    HGB 14.0 2022    HGB 13.5 2022    HCT 41.6 2022    HCT 40.6 2022     2022     2022     Lab Results   Component Value Date    APTT 28.4 2021    APTT 25.9 2020    INR 0.9 2020    INR 1.0 02/15/2020     Signed By: Shanta Willingham MD     2022

## 2022-06-07 VITALS
HEIGHT: 68 IN | OXYGEN SATURATION: 95 % | DIASTOLIC BLOOD PRESSURE: 74 MMHG | HEART RATE: 88 BPM | BODY MASS INDEX: 28.79 KG/M2 | SYSTOLIC BLOOD PRESSURE: 113 MMHG | RESPIRATION RATE: 18 BRPM | WEIGHT: 190 LBS | TEMPERATURE: 98.6 F

## 2022-06-07 LAB
ANION GAP SERPL CALC-SCNC: 8 MMOL/L (ref 7–16)
BUN SERPL-MCNC: 6 MG/DL (ref 6–23)
CALCIUM SERPL-MCNC: 8.7 MG/DL (ref 8.3–10.4)
CHLORIDE SERPL-SCNC: 111 MMOL/L (ref 98–107)
CO2 SERPL-SCNC: 24 MMOL/L (ref 21–32)
CREAT SERPL-MCNC: 0.6 MG/DL (ref 0.8–1.5)
ERYTHROCYTE [DISTWIDTH] IN BLOOD BY AUTOMATED COUNT: 12.4 % (ref 11.9–14.6)
GLUCOSE SERPL-MCNC: 105 MG/DL (ref 65–100)
HCT VFR BLD AUTO: 42.4 % (ref 41.1–50.3)
HGB BLD-MCNC: 14.2 G/DL (ref 13.6–17.2)
MCH RBC QN AUTO: 31.6 PG (ref 26.1–32.9)
MCHC RBC AUTO-ENTMCNC: 33.5 G/DL (ref 31.4–35)
MCV RBC AUTO: 94.4 FL (ref 79.6–97.8)
NRBC # BLD: 0 K/UL (ref 0–0.2)
PLATELET # BLD AUTO: 248 K/UL (ref 150–450)
PMV BLD AUTO: 9.4 FL (ref 9.4–12.3)
POTASSIUM SERPL-SCNC: 4.1 MMOL/L (ref 3.5–5.1)
RBC # BLD AUTO: 4.49 M/UL (ref 4.23–5.6)
SODIUM SERPL-SCNC: 143 MMOL/L (ref 138–145)
WBC # BLD AUTO: 3.6 K/UL (ref 4.3–11.1)

## 2022-06-07 PROCEDURE — 6370000000 HC RX 637 (ALT 250 FOR IP): Performed by: FAMILY MEDICINE

## 2022-06-07 PROCEDURE — 6360000002 HC RX W HCPCS: Performed by: FAMILY MEDICINE

## 2022-06-07 PROCEDURE — 85027 COMPLETE CBC AUTOMATED: CPT

## 2022-06-07 PROCEDURE — 2580000003 HC RX 258: Performed by: NURSE PRACTITIONER

## 2022-06-07 PROCEDURE — 36415 COLL VENOUS BLD VENIPUNCTURE: CPT

## 2022-06-07 PROCEDURE — 80048 BASIC METABOLIC PNL TOTAL CA: CPT

## 2022-06-07 PROCEDURE — 2580000003 HC RX 258: Performed by: HOSPITALIST

## 2022-06-07 PROCEDURE — 2500000003 HC RX 250 WO HCPCS: Performed by: HOSPITALIST

## 2022-06-07 RX ORDER — METRONIDAZOLE 500 MG/1
500 TABLET ORAL 3 TIMES DAILY
Qty: 30 TABLET | Refills: 0 | Status: SHIPPED | OUTPATIENT
Start: 2022-06-07 | End: 2022-06-17

## 2022-06-07 RX ORDER — SACCHAROMYCES BOULARDII 250 MG
250 CAPSULE ORAL 2 TIMES DAILY
Qty: 20 CAPSULE | Refills: 0 | Status: SHIPPED | OUTPATIENT
Start: 2022-06-07 | End: 2022-06-17

## 2022-06-07 RX ORDER — SACCHAROMYCES BOULARDII 250 MG
250 CAPSULE ORAL 2 TIMES DAILY
Status: DISCONTINUED | OUTPATIENT
Start: 2022-06-07 | End: 2022-06-07 | Stop reason: HOSPADM

## 2022-06-07 RX ORDER — CIPROFLOXACIN 500 MG/1
500 TABLET, FILM COATED ORAL 2 TIMES DAILY
Qty: 20 TABLET | Refills: 0 | Status: SHIPPED | OUTPATIENT
Start: 2022-06-07 | End: 2022-06-17

## 2022-06-07 RX ADMIN — POTASSIUM CHLORIDE 20 MEQ: 20 TABLET, EXTENDED RELEASE ORAL at 08:46

## 2022-06-07 RX ADMIN — PSYLLIUM HUSK 1 PACKET: 3.4 POWDER ORAL at 08:46

## 2022-06-07 RX ADMIN — METRONIDAZOLE 500 MG: 500 INJECTION, SOLUTION INTRAVENOUS at 00:52

## 2022-06-07 RX ADMIN — Medication 250 MG: at 12:37

## 2022-06-07 RX ADMIN — SODIUM CHLORIDE, PRESERVATIVE FREE 10 ML: 5 INJECTION INTRAVENOUS at 08:48

## 2022-06-07 RX ADMIN — METRONIDAZOLE 500 MG: 500 INJECTION, SOLUTION INTRAVENOUS at 12:38

## 2022-06-07 RX ADMIN — POTASSIUM CHLORIDE 20 MEQ: 20 TABLET, EXTENDED RELEASE ORAL at 17:19

## 2022-06-07 RX ADMIN — CIPROFLOXACIN 400 MG: 2 INJECTION, SOLUTION INTRAVENOUS at 04:36

## 2022-06-07 RX ADMIN — DEXTROSE AND SODIUM CHLORIDE: 5; 450 INJECTION, SOLUTION INTRAVENOUS at 00:57

## 2022-06-07 NOTE — PLAN OF CARE
Problem: Discharge Planning  Goal: Discharge to home or other facility with appropriate resources  Outcome: Completed     Problem: Pain  Goal: Verbalizes/displays adequate comfort level or baseline comfort level  Outcome: Completed     Problem: Safety - Adult  Goal: Free from fall injury  Outcome: Completed     Problem: Skin/Tissue Integrity  Goal: Absence of new skin breakdown  Description: 1. Monitor for areas of redness and/or skin breakdown  2. Assess vascular access sites hourly  3. Every 4-6 hours minimum:  Change oxygen saturation probe site  4. Every 4-6 hours:  If on nasal continuous positive airway pressure, respiratory therapy assess nares and determine need for appliance change or resting period.   Outcome: Completed

## 2022-06-07 NOTE — PROGRESS NOTES
Hospitalist Progress Note   Admit Date:  6/3/2022 11:20 PM   Name:  Randal Vasquez   Age:  62 y.o. Sex:  male  :  1965   MRN:  [id number]   Room:  828/    Presenting Complaint: No chief complaint on file. Reason(s) for Admission: Diverticulitis of colon with perforation [facility] Course & Interval History:   62 y.o. male with past medical history of PE (s/p treatment  ), currently on no meds who presented to [facility] with complaints of rectal pressure and pain for the past 4 to 5 days. Last BM day PTA. on 6/3, had nausea, decreased appetite, chills, T max 100.6. Aching from the waist down. No vomiting. History of diverticulitis with anterior colon resection 14 years ago. Has had GI bleed in past from diverticulosis. No bleeding recently. In ER, he had low grade fever 100.1 and tachycardia. Rectal exam in ER revealed rectal tenderness but no masses felt. Imaging revealed diverticulitis with contained perforation     ER MD Discussed with surgery. They recommended downtown admission to hospitalist service as IR may be the one to intervene in this case. Jessica Toro was started on metronidazole with Rocephin due to the patient's penicillin allergy.      Patient is admitted for diverticulitis with contained perforation. Pt placed on broad-spectrum antibiotics. General surgery and IR have been consulted. Dr. Risa Solorio saw pt on  and stated that there is oral contrast contained diverticula or knuckle of colon adjacent to the staple line that has been present since at least . This \"knuckle\" is now larger and surrounded by inflammation, likely obstructed at the connection to the flow lumen of the recto-sigmoid colon. There is no discrete abscess.  No role for percutaneous drainage as this would result in the drain being intra-luminal.   Surgery reviewed imaging as well and recommended no surgical intervention required or planned at this time surgery signed off  and was okay for patient to be discharged. Subjective/24hr Events (06/07/22): Patient was aware of plan from surgery and IR for no intervention as findings appear to be chronic. We discussed GI consult for diverticulitis education and follow-up. Diet will be resumed. Has had small bowel movements and still with intermittent lower abdominal pressure but no pain. No fever, chills, SOB, chest pain, abdominal pain or nausea or vomiting. Assessment & Plan:     Diverticulitis of intestine with perforation  CTAP shows contained perforation of diverticulitis present at anastomotic site at the rectosigmoid junction  ABX: Change Rocephin to Cipro/Flagyl (6/4-. ..) Empirically  Analgesics and antiemetics as needed  Surgery and IR have seen. Appearance chronic. No role for percutaneous drainage as no abscess. No surgical intervention. Surgery Ok for patient to be discharged  Resume diet and d/c IVF  Consult GI, appreciate recs. F/u with GI in 4 weeks  Anticipate discharge in Am once able to have BM and tolerating po. Anticipate going home on Cipro and Flagyl for 10 days to complete a total of 14 days of antibiotics. Add probiotics    Constipation  Colace. Cont home Psyllium daily  Dulcolax prn    Dilated cardiomyopathy  EtOH induced (quit since 2015)  [address] [facility] cardiology outpatient    Hx of PE/DVT  Follows pulmonary outpatient. Had VTE after having covid infection  Finished with Xarelto 2 months ago per patient    Hx HTN  Normotensive w/o antihypertensives        Discharge Planning: In AM once tolerating po and able to have BM. F/u with GI outpatient in 4 weeks. Diet:  ADULT DIET; Dysphagia - Soft and Bite Sized  DVT PPx: heparin SQ  Code status: Full Code    Hospital Problems:  Principal Problem:    Diverticulitis of intestine with perforation  Active Problems:    Diverticulitis of colon with perforation  Resolved Problems:    * No resolved hospital problems.  *      Objective:     Patient Vitals for the past 24 hrs: Temp Pulse Resp BP SpO2   06/07/22 1107 97.9 °F (36.6 °C) (!) 117 18 114/88 96 %   06/07/22 0722 97.3 °F (36.3 °C) 91 18 123/86 96 %   06/07/22 0253 97.9 °F (36.6 °C) 63 12 114/64 95 %   06/06/22 2344 98.6 °F (37 °C) 69 12 102/61 93 %   06/06/22 2018 98.1 °F (36.7 °C) 53 16 131/80 98 %   06/06/22 1936 -- 74 -- -- --   06/06/22 1526 98.4 °F (36.9 °C) 76 20 129/84 97 %       Oxygen Therapy  SpO2: 96 %  O2 Device: None (Room air)    Estimated body mass index is 28.89 kg/m² as calculated from the following:    Height as of this encounter: 5' 8\" (1.727 m). Weight as of this encounter: 190 lb (86.2 kg). Intake/Output Summary (Last 24 hours) at 6/7/2022 1205  Last data filed at 6/6/2022 1510  Gross per 24 hour   Intake 240 ml   Output 0 ml   Net 240 ml         Physical Exam:     Blood pressure 114/88, pulse (!) 117, temperature 97.9 °F (36.6 °C), temperature source Oral, resp. rate 18, height 5' 8\" (1.727 m), weight 190 lb (86.2 kg), SpO2 96 %. General:    Well nourished. Head:  Normocephalic, atraumatic  Eyes:  Sclerae appear normal.  Pupils equally round. ENT:  Nares appear normal, no drainage. Moist oral mucosa  Neck:  No restricted ROM. Trachea midline   CV:   RRR. No m/r/g. No jugular venous distension. Lungs:   CTAB. No wheezing, rhonchi, or rales. Respirations even, unlabored  Abdomen: Bowel sounds present. Soft, nondistended, nontender  Extremities: No cyanosis or clubbing. No edema  Skin:     No rashes and normal coloration. Warm and dry. Neuro:  CN II-XII grossly intact. Sensation intact. A&Ox3  Psych:  Normal mood and affect.       I have reviewed ordered lab tests and independently visualized imaging below:    Recent Labs:  Recent Results (from the past 48 hour(s))   CBC    Collection Time: 06/06/22  3:43 AM   Result Value Ref Range    WBC 4.5 4.3 - 11.1 K/uL    RBC 4.32 4.23 - 5.6 M/uL    Hemoglobin 14.0 13.6 - 17.2 g/dL    Hematocrit 41.6 41.1 - 50.3 %    MCV 96.3 79.6 - 97.8 FL MCH 32.4 26.1 - 32.9 PG    MCHC 33.7 31.4 - 35.0 g/dL    RDW 12.4 11.9 - 14.6 %    Platelets 942 452 - 614 K/uL    MPV 9.2 (L) 9.4 - 12.3 FL    nRBC 0.00 0.0 - 0.2 K/uL   Basic Metabolic Panel    Collection Time: 06/06/22  3:43 AM   Result Value Ref Range    Sodium 142 138 - 145 mmol/L    Potassium 3.3 (L) 3.5 - 5.1 mmol/L    Chloride 109 (H) 98 - 107 mmol/L    CO2 26 21 - 32 mmol/L    Anion Gap 7 7 - 16 mmol/L    Glucose 112 (H) 65 - 100 mg/dL    BUN 7 6 - 23 MG/DL    CREATININE 0.60 (L) 0.8 - 1.5 MG/DL    GFR African American >60 >60 ml/min/1.73m2    GFR Non- >60 >60 ml/min/1.73m2    Calcium 8.5 8.3 - 10.4 MG/DL   CBC    Collection Time: 06/07/22  4:16 AM   Result Value Ref Range    WBC 3.6 (L) 4.3 - 11.1 K/uL    RBC 4.49 4.23 - 5.6 M/uL    Hemoglobin 14.2 13.6 - 17.2 g/dL    Hematocrit 42.4 41.1 - 50.3 %    MCV 94.4 79.6 - 97.8 FL    MCH 31.6 26.1 - 32.9 PG    MCHC 33.5 31.4 - 35.0 g/dL    RDW 12.4 11.9 - 14.6 %    Platelets 172 200 - 265 K/uL    MPV 9.4 9.4 - 12.3 FL    nRBC 0.00 0.0 - 0.2 K/uL   Basic Metabolic Panel    Collection Time: 06/07/22  4:16 AM   Result Value Ref Range    Sodium 143 138 - 145 mmol/L    Potassium 4.1 3.5 - 5.1 mmol/L    Chloride 111 (H) 98 - 107 mmol/L    CO2 24 21 - 32 mmol/L    Anion Gap 8 7 - 16 mmol/L    Glucose 105 (H) 65 - 100 mg/dL    BUN 6 6 - 23 MG/DL    CREATININE 0.60 (L) 0.8 - 1.5 MG/DL    GFR African American >60 >60 ml/min/1.73m2    GFR Non- >60 >60 ml/min/1.73m2    Calcium 8.7 8.3 - 10.4 MG/DL       Other Studies:  CT ABDOMEN PELVIS W IV CONTRAST Additional Contrast? Oral    Result Date: 6/3/2022  HISTORY:  abdominal pain, history of diverticulitis, fever. Rectal pressure, 4-5 days duration. COMPARISON: 9/23/2020 EXAM: CT abdomen and pelvis with iv contrast TECHNIQUE: Thin section axial CT was performed from the lung bases through the symphysis pubis during uneventful rapid bolus intravenous administration of 125 mL of Isovue 370.  Oral contrast was also administered. Radiation dose reduction techniques were used for this study. Our CT scanners use one or all of the following: Automated exposure control, adjustment of the mA and/or kV according to patient size, use of iterative reconstruction. FINDINGS: There is a small right middle lobe subpleural nodule measuring 3 mm (image 1). There is also a hiatal hernia present. CT abdomen: The liver and spleen enhances homogeneously without discrete lesions. There is no biliary ductal dilatation. The gallbladder, pancreas and adrenal glands are normal. The kidneys enhance symmetrically. There is a cyst within the lower pole the right kidney. Bowel loops in the upper abdomen are normal. No definite upper abdominal lymphadenopathy seen. CT pelvis: The appendix is normal. There is marked pericolonic fat stranding at the rectosigmoid junction, at the site of prior anastomosis. There is an air and fluid collection at this level measuring 3.3 x 2.8 cm. The bladder and rectum are normal. No pelvic adenopathy seen. No free air or free fluid seen within the pelvis. Bone window evaluation demonstrates no aggressive osseous lesions. Findings compatible with contained perforation of diverticulitis.  This is present at the anastomotic site at the rectosigmoid junction       Current Meds:  Current Facility-Administered Medications   Medication Dose Route Frequency    potassium chloride (KLOR-CON M) extended release tablet 40 mEq  40 mEq Oral PRN    Or    potassium bicarb-citric acid (EFFER-K) effervescent tablet 40 mEq  40 mEq Oral PRN    Or    potassium chloride 10 mEq/100 mL IVPB (Peripheral Line)  10 mEq IntraVENous PRN    magnesium sulfate 2000 mg in 50 mL IVPB premix  2,000 mg IntraVENous PRN    potassium chloride (KLOR-CON M) extended release tablet 20 mEq  20 mEq Oral BID WC    psyllium (METAMUCIL) 58.12 % packet 1 packet  1 packet Oral Daily    HYDROcodone-acetaminophen (NORCO) 5-325 MG per tablet 1 tablet  1 tablet Oral Q6H PRN    ciprofloxacin (CIPRO) IVPB 400 mg  400 mg IntraVENous Q12H    docusate sodium (COLACE) capsule 100 mg  100 mg Oral Daily    bisacodyl (DULCOLAX) EC tablet 5 mg  5 mg Oral Daily PRN    heparin (porcine) injection 5,000 Units  5,000 Units SubCUTAneous 3 times per day    sodium chloride flush 0.9 % injection 5-40 mL  5-40 mL IntraVENous 2 times per day    sodium chloride flush 0.9 % injection 5-40 mL  5-40 mL IntraVENous PRN    0.9 % sodium chloride infusion   IntraVENous PRN    ondansetron (ZOFRAN-ODT) disintegrating tablet 4 mg  4 mg Oral Q8H PRN    Or    ondansetron (ZOFRAN) injection 4 mg  4 mg IntraVENous Q6H PRN    aluminum & magnesium hydroxide-simethicone (MAALOX) 200-200-20 MG/5ML suspension 30 mL  30 mL Oral Q6H PRN    acetaminophen (TYLENOL) tablet 650 mg  650 mg Oral Q6H PRN    Or    acetaminophen (TYLENOL) suppository 650 mg  650 mg Rectal Q6H PRN    metronidazole (FLAGYL) 500 mg in 0.9% NaCl 100 mL IVPB premix  500 mg IntraVENous Q12H    morphine injection 2 mg  2 mg IntraVENous Q4H PRN       Signed: Ivy Shetty DO    Part of this note may have been written by using a voice dictation software. The note has been proof read but may still contain some grammatical/other typographical errors.

## 2022-06-07 NOTE — PROGRESS NOTES
100 mg Oral Daily    bisacodyl (DULCOLAX) EC tablet 5 mg  5 mg Oral Daily PRN    heparin (porcine) injection 5,000 Units  5,000 Units SubCUTAneous 3 times per day    sodium chloride flush 0.9 % injection 5-40 mL  5-40 mL IntraVENous 2 times per day    sodium chloride flush 0.9 % injection 5-40 mL  5-40 mL IntraVENous PRN    0.9 % sodium chloride infusion   IntraVENous PRN    ondansetron (ZOFRAN-ODT) disintegrating tablet 4 mg  4 mg Oral Q8H PRN    Or    ondansetron (ZOFRAN) injection 4 mg  4 mg IntraVENous Q6H PRN    aluminum & magnesium hydroxide-simethicone (MAALOX) 200-200-20 MG/5ML suspension 30 mL  30 mL Oral Q6H PRN    acetaminophen (TYLENOL) tablet 650 mg  650 mg Oral Q6H PRN    Or    acetaminophen (TYLENOL) suppository 650 mg  650 mg Rectal Q6H PRN    dextrose 5 % and 0.45 % sodium chloride infusion   IntraVENous Continuous    metronidazole (FLAGYL) 500 mg in 0.9% NaCl 100 mL IVPB premix  500 mg IntraVENous Q12H    morphine injection 2 mg  2 mg IntraVENous Q4H PRN     Penicillins  Social History     Socioeconomic History    Marital status:      Spouse name: Not on file    Number of children: Not on file    Years of education: Not on file    Highest education level: Not on file   Occupational History    Not on file   Tobacco Use    Smoking status: Former Smoker     Quit date: 2015     Years since quittin.5    Smokeless tobacco: Never Used   Substance and Sexual Activity    Alcohol use: No     Alcohol/week: 0.0 standard drinks    Drug use: No    Sexual activity: Not on file   Other Topics Concern    Not on file   Social History Narrative    Not on file     Social Determinants of Health     Financial Resource Strain:     Difficulty of Paying Living Expenses: Not on file   Food Insecurity:     Worried About Running Out of Food in the Last Year: Not on file    [place] of Food in the Last Year: Not on file   Transportation Needs:     Lack of Transportation (Medical): Not on file    Lack of Transportation (Non-Medical): Not on file   Physical Activity:     Days of Exercise per Week: Not on file    Minutes of Exercise per Session: Not on file   Stress:     Feeling of Stress : Not on file   Social Connections:     Frequency of Communication with Friends and Family: Not on file    Frequency of Social Gatherings with Friends and Family: Not on file    Attends Evangelical Services: Not on file    Active Member of [address] [place] [address] or Organizations: Not on file    Attends Club or Organization Meetings: Not on file    Marital Status: Not on file   Intimate Partner Violence:     Fear of Current or Ex-Partner: Not on file    Emotionally Abused: Not on file    Physically Abused: Not on file    Sexually Abused: Not on file   Housing Stability:     Unable to Pay for Housing in the Last Year: Not on file    Number of [place] in the Last Year: Not on file    Unstable Housing in the Last Year: Not on file     Social History     Tobacco Use   Smoking Status Former Smoker    Quit date: 2015    Years since quittin.5   Smokeless Tobacco Never Used     Family History   Problem Relation Age of Onset    No Known Problems Brother     Cancer Father         brain    Breast Cancer Mother      ROS: The patient has no difficulty with chest pain or shortness of breath. No fever or chills. Comprehensive review of systems was otherwise unremarkable except as noted above. Physical Exam:   /86   Pulse 91   Temp 97.3 °F (36.3 °C) (Oral)   Resp 18   Ht 5' 8\" (1.727 m)   Wt 190 lb (86.2 kg)   SpO2 96%   BMI 28.89 kg/m²   Vitals:    22 2018 22 2344 22 0253 22 0722   BP: 131/80 102/61 114/64 123/86   Pulse: 53 69 63 91   Resp: 16 12 12 18   Temp: 98.1 °F (36.7 °C) 98.6 °F (37 °C) 97.9 °F (36.6 °C) 97.3 °F (36.3 °C)   TempSrc:    Oral   SpO2: 98% 93% 95% 96%   Weight:       Height:         No intake/output data recorded.    1901 -  0700  In: 300 [P.O.:300]  Out: 0     Constitutional: Alert, oriented, cooperative patient in no acute distress; appears stated age    Eyes:Sclera are clear. EOMs intact  ENMT: no external lesions gross hearing normal; no obvious neck masses, no ear or lip lesions, nares normal  CV: RRR. Normal perfusion  Resp: No JVD. Breathing is  non-labored; no audible wheezing. GI: soft and non-distended , tender in L suprapubic area, no peritonitis or guarding  Musculoskeletal: unremarkable with normal function. No embolic signs or cyanosis. Neuro:  Oriented; moves all 4; no focal deficits  Psychiatric: normal affect and mood, no memory impairment    Recent vitals (if inpt):  Patient Vitals for the past 24 hrs:   BP Temp Temp src Pulse Resp SpO2   06/07/22 0722 123/86 97.3 °F (36.3 °C) Oral 91 18 96 %   06/07/22 0253 114/64 97.9 °F (36.6 °C) -- 63 12 95 %   06/06/22 2344 102/61 98.6 °F (37 °C) -- 69 12 93 %   06/06/22 2018 131/80 98.1 °F (36.7 °C) -- 53 16 98 %   06/06/22 1936 -- -- -- 74 -- --   06/06/22 1930 -- -- Oral -- -- --   06/06/22 1526 129/84 98.4 °F (36.9 °C) Oral 76 20 97 %   06/06/22 1100 124/82 98.4 °F (36.9 °C) Oral 74 20 95 %       Amount and/or Complexity of Data Reviewed and Analyzed:  I reviewed and analyzed all of the unique labs and radiologic studies that are shown below as well as any that are in the HPI, and any that are in the expanded problem list below  *Each unique test, order, or document contributes to the combination of 2 or combination of 3 in Category 1 below. For this visit I also reviewed old records and prior notes.       Recent Labs     06/07/22  0416   WBC 3.6*   HGB 14.2         K 4.1   *   CO2 24   BUN 6     Review of most recent CBC  Lab Results   Component Value Date    WBC 3.6 (L) 06/07/2022    HGB 14.2 06/07/2022    HCT 42.4 06/07/2022    MCV 94.4 06/07/2022     06/07/2022       Review of most recent BMP  Lab Results   Component Value Date     06/07/2022    K 4.1 06/07/2022     06/07/2022    CO2 24 06/07/2022    BUN 6 06/07/2022    CREATININE 0.60 06/07/2022    GLUCOSE 105 06/07/2022    CALCIUM 8.7 06/07/2022        Review of most recent LFTs (and lipase if done)  Lab Results   Component Value Date    ALT 19 06/03/2022    AST 17 06/03/2022    ALKPHOS 47 06/03/2022    BILITOT 0.7 06/03/2022     Lab Results   Component Value Date    LIPASE 18 06/03/2022       Lab Results   Component Value Date    INR 0.9 02/20/2020    APTT 28.4 04/06/2021       Review of most recent HgbA1c  No results found for: LABA1C  No results found for: EAG    Nutritional assessment screen for wound healing issues:  Lab Results   Component Value Date    LABALBU 3.9 06/03/2022       @lastcovr@    Xray Result (most recent):  XR CHEST STANDARD TWO VW 03/15/2021    Narrative  EXAM: CHEST X-RAY, 2 VIEWS    INDICATION: Dyspnea on exertion    COMPARISON: Chest x-ray 9/9/2009    TECHNIQUE: Frontal and lateral views of the chest were obtained. FINDINGS: The lungs are clear and the pulmonary vasculature is normal. No  pneumothorax or pleural effusion. The cardiomediastinal silhouette is within  normal limits. The bones are diffusely demineralized. Multilevel degenerative  changes of the spine. Surgical clips project over the upper abdomen. Impression  No radiographic evidence of acute cardiopulmonary disease. CT Result (most recent):  CT ABDOMEN PELVIS W IV CONTRAST 06/03/2022    Narrative  HISTORY:  abdominal pain, history of diverticulitis, fever. Rectal pressure, 4-5  days duration. COMPARISON: 9/23/2020    EXAM: CT abdomen and pelvis with iv contrast    TECHNIQUE:  Thin section axial CT was performed from the lung bases through the symphysis  pubis during uneventful rapid bolus intravenous administration of 125 mL of  Isovue 370. Oral contrast was also administered. Radiation dose reduction  techniques were used for this study.   Our CT scanners use one or all of the  following: Automated exposure control, adjustment of the mA and/or kV according  to patient size, use of iterative reconstruction. FINDINGS:    There is a small right middle lobe subpleural nodule measuring 3 mm (image 1). There is also a hiatal hernia present. CT abdomen: The liver and spleen enhances homogeneously without discrete  lesions. There is no biliary ductal dilatation. The gallbladder, pancreas and  adrenal glands are normal. The kidneys enhance symmetrically. There is a cyst  within the lower pole the right kidney. Bowel loops in the upper abdomen are  normal. No definite upper abdominal lymphadenopathy seen. CT pelvis: The appendix is normal. There is marked pericolonic fat stranding at  the rectosigmoid junction, at the site of prior anastomosis. There is an air and  fluid collection at this level measuring 3.3 x 2.8 cm. The bladder and rectum  are normal. No pelvic adenopathy seen. No free air or free fluid seen within the  pelvis. Bone window evaluation demonstrates no aggressive osseous lesions. Impression  Findings compatible with contained perforation of diverticulitis. This is  present at the anastomotic site at the rectosigmoid junction    US Result (most recent):  [place] BILATERAL 04/06/2021    Narrative  Bilateral lower extremity venous ultrasound    INDICATION:  Pain and swelling, pulmonary embolus    Doppler ultrasound of both lower extremities was performed. FINDINGS:  There is normal flow in the greater saphenous, common femoral,  superficial femoral, and popliteal veins. Normal compression and augmentation is  demonstrated. The proximal right calf and are patent. There is thrombus within the peroneal  vein in the mid left calf. No fluid collections are seen in either leg. Impression  1. Thrombosis of the mid left peroneal vein below the level of the knee. 2.  No evidence of DVT in the right leg.       Admission date (for inpatients): 6/3/2022 ASSESSMENT/PLAN:    Principal Problem:    Diverticulitis of intestine with perforation  Active Problems:    Diverticulitis of colon with perforation  Resolved Problems:    * No resolved hospital problems. *     Patient Active Problem List    Diagnosis Date Noted    Diverticulitis of intestine with perforation 06/03/2022     Priority: Medium    Diverticulitis of colon with perforation 06/03/2022     Priority: Medium    AISHA (obstructive sleep apnea) 08/20/2021    Chronic deep vein thrombosis (DVT) of tibial vein of left lower extremity ([address] 04/08/2021    Nocturnal hypoxia 04/08/2021    Anxiety     Suspected sleep apnea 04/07/2021    History of GI bleed 04/06/2021    Pulmonary embolism, bilateral ([address] 04/06/2021    Exertional dyspnea 03/17/2021    Diverticulosis 02/15/2020    PVCs (premature ventricular contractions) 12/19/2018    GERD (gastroesophageal reflux disease) 12/19/2018    Recurrent depression ([address] 12/20/2017    Essential hypertension with goal blood pressure less than 130/85 01/19/2017    Myocardiopathy ([address] 06/30/2016          A/P    Previously suspected recurrent descending colon diverticulitis with contained perforation- unlikely as IR consult yesterday noted that the area on CT of concern has been present since at least 2014- and is a diverticula or \"knuckle\" adjacent to the staple line. It is now larger and surrounded by inflammation without a discrete abscess. No Leukocytosis. No surgical intervention required or planned for at this time. Will sign off.       Continue current plan of care with per Hospitalist

## 2022-06-07 NOTE — PROGRESS NOTES
MEWS score 3 at this time. Temp 97.9  RR 18 /88 O2 96. Dr. Jacinta Barrios notified of the above via [employer].

## 2022-06-07 NOTE — PROGRESS NOTES
Patient AxO x4. On room air. All lines removed. No signs of distress. Discharge instructions provided to patient. Opportunity for questions provided. No questions at this time. Discharged with all patient belongings.

## 2022-06-07 NOTE — PROGRESS NOTES
Patient has been observed during hourly rounds and has slept well and quietly overnight. His wife has remained at bedside. He denies needs, his VSS and he has denied pain. He continues with D51/2 NS at 50 ml/hr. Safety measures continue in place, bed low and locked and call light in reach of patient. Will prepare for report to oncoming nurse.

## 2022-06-07 NOTE — CONSULTS
[facility] Consult Note       Primary GI Physician: Dr. Hoa Pressley    Referring Provider:  Dr. Irineo Page    Consult Date:  6/7/2022    Admit Date:  6/3/2022    Chief Complaint:  Diverticulitis    Subjective:     History of Present Illness:  Patient is a 62 y.o. male with PMH including but not limited to ETOH abuse, Anxiety, Cardiomyopathy, HF, GERD, S/P Nissen Fundoplication, Partial Colectomy due to diverticulitis, who is seen in consultation at the request of Dr. Irineo Page for diverticulitis. Patient presented to the ED on 6/3/2022 for abdominal pain. CT A/P on 6/3/2022 with findings compatible with contained perforation of diverticulitis, present at the anastomotic site at the rectosigmoid junction. Surgery was consulted on 6/4 and recommended IR consult for possible drainage. Dr. Alayna Callahan saw patient on 6/6: noted that the area on CT of concern has been present since at least 2014- and is a diverticula or \"knuckle\" adjacent to the staple line. It is now larger and surrounded by inflammation without a discrete abscess. They did not recommend percutaneous drainage as this would result in drain being intraluminal.    Patient is sitting in a chair and reports that \"overall I feel better\". He reports dull ache in the LLQ. He has had a BM today. Denies any melena/hematochezia. He reports typically having daily BM with taking Metamucil. Denies any issues with constipation. He has questions about diet and how to prevent diverticulitis going forward. He denies any nausea/vomiting. He is tolerating a soft diet today. Colonoscopy 2/24/2020: No active bleeding, Diverticulosis noted throughout but mostly on the left side. EGD 9/24/2020 for hematochezia: tortuous distal esophgus, HH, no source of GI bleeding. Repeat colonoscopy at that time for hematochezia: pan diverticulosis, no active bleeding or culprit lesion identified.      PMH:  Past Medical History:   Diagnosis Date    Alcoholism /alcohol abuse 20 mEq Oral BID WC    psyllium (METAMUCIL) 58.12 % packet 1 packet  1 packet Oral Daily    HYDROcodone-acetaminophen (NORCO) 5-325 MG per tablet 1 tablet  1 tablet Oral Q6H PRN    ciprofloxacin (CIPRO) IVPB 400 mg  400 mg IntraVENous Q12H    docusate sodium (COLACE) capsule 100 mg  100 mg Oral Daily    bisacodyl (DULCOLAX) EC tablet 5 mg  5 mg Oral Daily PRN    heparin (porcine) injection 5,000 Units  5,000 Units SubCUTAneous 3 times per day    sodium chloride flush 0.9 % injection 5-40 mL  5-40 mL IntraVENous 2 times per day    sodium chloride flush 0.9 % injection 5-40 mL  5-40 mL IntraVENous PRN    0.9 % sodium chloride infusion   IntraVENous PRN    ondansetron (ZOFRAN-ODT) disintegrating tablet 4 mg  4 mg Oral Q8H PRN    Or    ondansetron (ZOFRAN) injection 4 mg  4 mg IntraVENous Q6H PRN    aluminum & magnesium hydroxide-simethicone (MAALOX) 200-200-20 MG/5ML suspension 30 mL  30 mL Oral Q6H PRN    acetaminophen (TYLENOL) tablet 650 mg  650 mg Oral Q6H PRN    Or    acetaminophen (TYLENOL) suppository 650 mg  650 mg Rectal Q6H PRN    metronidazole (FLAGYL) 500 mg in 0.9% NaCl 100 mL IVPB premix  500 mg IntraVENous Q12H    morphine injection 2 mg  2 mg IntraVENous Q4H PRN       Social History:  Social History     Tobacco Use    Smoking status: Former Smoker     Quit date: 2015     Years since quittin.5    Smokeless tobacco: Never Used   Substance Use Topics    Alcohol use: No     Alcohol/week: 0.0 standard drinks       Pt denies any history of drug use, blood transfusions, or tattoos. Family History:  Family History   Problem Relation Age of Onset    No Known Problems Brother     Cancer Father         brain    Breast Cancer Mother        Review of Systems:  A detailed 10 system ROS is obtained, with pertinent positives as listed above. All others are negative.     Diet:      Objective:     Physical Exam:  Vitals:  /86   Pulse 91   Temp 97.3 °F (36.3 °C) (Oral)   Resp 18 adenopathy seen. No free air or free fluid seen within the   pelvis.        Bone window evaluation demonstrates no aggressive osseous lesions.           Impression   Findings compatible with contained perforation of diverticulitis. This is   present at the anastomotic site at the rectosigmoid junction         ---------------------------------------------  EGD 9/24/2020: Impression:     Tortuous distal esophagus. Hiatal hernia. No source of GI bleeding is seen. Plan:   Proceed with colonoscopy as planned for today. Signed:   Nata Ludwig MD   9/24/2020   9:41 AM  -----------------------------------------------------  Colonoscopy 9/24/2020  Impression:     Pandiverticulosis. No active bleeding or culprit lesion was identified for endoscopic therapy. Plan:   1. Resume diet. 2. If rebleeding occurs, patient will likely require IR embolization or surgery. 3. We will sign off, but do not hesitate to contact us for any additional assistance. We will arrange for a GI follow-up office visit in 3-4 weeks. Patient will be contacted by our office. Signed:   Nata Ludwig MD   9/24/2020   9:42 AM       Assessment:     Principal Problem:    Diverticulitis of intestine with perforation  Active Problems:    Diverticulitis of colon with perforation  Resolved Problems:    * No resolved hospital problems. *    Patient is a 62 y.o. male with PMH including but not limited to ETOH abuse, Anxiety, Cardiomyopathy, HF, GERD, S/P Nissen Fundoplication, Partial Colectomy due to diverticulitis, who is seen in consultation at the request of Dr. Vanessa Almanzar for diverticulitis. CT A/P on 6/3/2022 with findings compatible with contained perforation of diverticulitis, present at the anastomotic site at the rectosigmoid junction. Dr. Daniel Marcelino saw patient on 6/6: noted that the area on CT of concern has been present since at least 2014- and is a diverticula or \"knuckle\" adjacent to the staple line.    It is now larger and surrounded by inflammation without a discrete abscess. They did not recommend percutaneous drainage as this would result in drain being intraluminal.    Patient requested consult as he had questions about diverticulitis. Plan:   Continue with IV antibiotics: On Flagyl and Cipro  Surgery and IR input reviewed. No plans for surgery, at this time. No role for endoscopy in the setting of diverticulitis with contained perforation  Advance diet as tolerated    Discussed recommendations for high fiber diet once acute diverticulitis has resolved. For now, would follow a low fiber/low residue diet. Also, discussed the importance of avoiding constipation, which has not been an issue for him. Follow-up in our office in 4-6 weeks after discharge. LUIS M Roy    Patient is seen and examined in collaboration with Laine Hassan. Assessment and plan as per .

## 2022-06-07 NOTE — DISCHARGE SUMMARY
likely obstructed at the connection to the flow lumen of the recto-sigmoid colon. Windell Mayela is no discrete abscess. No role for percutaneous drainage as this would result in the drain being intra-luminal.   Surgery reviewed imaging as well and recommended no surgical intervention required or planned at this time surgery signed off 6/7 and was okay for patient to be discharged. GI was consulted and recommended to continue with antibiotics. They will follow up with patient in 2 weeks after discharged. No role for endoscopy in the setting of diverticulitis with contained perforation. GI recommended high fiber diet once acute diverticulitis has resolved but for now, follow a low fiber/low residue diet. Avoid constipation which pt does not have an issue with. Pt's diet was advanced and he was able to tolerated well. Patient was able to have a bowel movement prior to discharge. No signs of GI bleeds. Patient felt comfortable with going home. He will need to finish 10-day course of Cipro/Flagyl to complete a total 14-day course of antibiotics. He will follow-up with his PCP in 1 week and follow-up with GI in 2 weeks. Return precautions given extensively. He was discharged in hemodynamically stable condition. Disposition: home   Diet: ADULT DIET; Dysphagia - Soft and Bite Sized  Code Status: Full Code    Follow Ups:   Jose Daniel King MD In 1 week. Specialty: Family Medicine  Why: hospital follow up  Contact information:  [address]  [phone]             [facility] In 2 weeks. Why: For Diverticulitis follow up  Contact information:  [address] [facility] [address] [facility]  [phone]                     Follow up labs/diagnostics (ultimately defer to outpatient provider):  PCP in 1 week  GI in 2 weeks    Time spent in patient discharge and coordination 45 minutes.     Plan was discussed with patient. All questions answered. Patient was stable at time of discharge. Instructions given to call a physician or return if any concerns. Current Discharge Medication List      START taking these medications    Details   saccharomyces boulardii (FLORASTOR) 250 MG capsule Take 1 capsule by mouth 2 times daily for 10 days  Qty: 20 capsule, Refills: 0      ciprofloxacin (CIPRO) 500 mg tablet Take 1 tablet by mouth 2 times daily for 10 days  Qty: 20 tablet, Refills: 0      metroNIDAZOLE (FLAGYL) 500 MG tablet Take 1 tablet by mouth 3 times daily for 10 days  Qty: 30 tablet, Refills: 0         CONTINUE these medications which have NOT CHANGED    Details   ascorbic acid (VITAMIN C) 1000 MG tablet Take 1,000 mg by mouth       cyanocobalamin 1000 MCG tablet Take 1,000 mcg by mouth daily         STOP taking these medications       rivaroxaban (XARELTO) 20 MG TABS tablet Comments:   Reason for Stopping:               Procedures done this admission:  * No surgery found *    Consults this admission:  IP CONSULT TO GENERAL SURGERY  IP CONSULT TO INTERVENTIONAL RADIOLOGY  IP CONSULT TO GI  FOLLOW UP VISIT  FOLLOW UP VISIT    Echocardiogram results:  No results found for this or any previous visit. Diagnostic Imaging/Tests:   CT ABDOMEN PELVIS W IV CONTRAST Additional Contrast? Oral    Result Date: 6/3/2022  HISTORY:  abdominal pain, history of diverticulitis, fever. Rectal pressure, 4-5 days duration. COMPARISON: 9/23/2020 EXAM: CT abdomen and pelvis with iv contrast TECHNIQUE: Thin section axial CT was performed from the lung bases through the symphysis pubis during uneventful rapid bolus intravenous administration of 125 mL of Isovue 370. Oral contrast was also administered. Radiation dose reduction techniques were used for this study.   Our CT scanners use one or all of the following: Automated exposure control, adjustment of the mA and/or kV according to patient size, use of iterative reconstruction. FINDINGS: There is a small right middle lobe subpleural nodule measuring 3 mm (image 1). There is also a hiatal hernia present. CT abdomen: The liver and spleen enhances homogeneously without discrete lesions. There is no biliary ductal dilatation. The gallbladder, pancreas and adrenal glands are normal. The kidneys enhance symmetrically. There is a cyst within the lower pole the right kidney. Bowel loops in the upper abdomen are normal. No definite upper abdominal lymphadenopathy seen. CT pelvis: The appendix is normal. There is marked pericolonic fat stranding at the rectosigmoid junction, at the site of prior anastomosis. There is an air and fluid collection at this level measuring 3.3 x 2.8 cm. The bladder and rectum are normal. No pelvic adenopathy seen. No free air or free fluid seen within the pelvis. Bone window evaluation demonstrates no aggressive osseous lesions. Findings compatible with contained perforation of diverticulitis. This is present at the anastomotic site at the rectosigmoid junction         Labs: Results:       BMP, Mg, Phos Recent Labs     06/06/22  0343 06/07/22  0416    143   K 3.3* 4.1   * 111*   CO2 26 24   BUN 7 6      CBC Recent Labs     06/06/22  0343 06/07/22  0416   WBC 4.5 3.6*   RBC 4.32 4.49   HGB 14.0 14.2   HCT 41.6 42.4    248      LFT No results for input(s): ALT, TP, ALB, GLOB in the last 72 hours.     Invalid input(s): SGOT, TBIL, AP, AGRAT, GPT   Cardiac Testing No results found for: BNP, CPK, RCK1, CKMB   Coagulation Tests Lab Results   Component Value Date    INR 0.9 02/20/2020    INR 1.0 02/15/2020    APTT 28.4 04/06/2021    APTT 25.9 02/20/2020    APTT 25.0 02/15/2020      A1c No results found for: HBA1C   Lipid Panel Lab Results   Component Value Date    CHOL 207 11/10/2020    HDL 54 11/10/2020    VLDL 12 11/10/2020      Thyroid Panel Lab Results   Component Value Date    TSH 2.350 11/10/2020    TSH 1.420 10/04/2019        Most Recent UA Lab Results   Component Value Date    MUCUS 1+ 06/03/2022    UCOM RESULTS VERIFIED MANUALLY 06/03/2022          All Labs from Last 24 Hrs:  Recent Results (from the past 24 hour(s))   CBC    Collection Time: 06/07/22  4:16 AM   Result Value Ref Range    WBC 3.6 (L) 4.3 - 11.1 K/uL    RBC 4.49 4.23 - 5.6 M/uL    Hemoglobin 14.2 13.6 - 17.2 g/dL    Hematocrit 42.4 41.1 - 50.3 %    MCV 94.4 79.6 - 97.8 FL    MCH 31.6 26.1 - 32.9 PG    MCHC 33.5 31.4 - 35.0 g/dL    RDW 12.4 11.9 - 14.6 %    Platelets 589 139 - 323 K/uL    MPV 9.4 9.4 - 12.3 FL    nRBC 0.00 0.0 - 0.2 K/uL   Basic Metabolic Panel    Collection Time: 06/07/22  4:16 AM   Result Value Ref Range    Sodium 143 138 - 145 mmol/L    Potassium 4.1 3.5 - 5.1 mmol/L    Chloride 111 (H) 98 - 107 mmol/L    CO2 24 21 - 32 mmol/L    Anion Gap 8 7 - 16 mmol/L    Glucose 105 (H) 65 - 100 mg/dL    BUN 6 6 - 23 MG/DL    CREATININE 0.60 (L) 0.8 - 1.5 MG/DL    GFR African American >60 >60 ml/min/1.73m2    GFR Non- >60 >60 ml/min/1.73m2    Calcium 8.7 8.3 - 10.4 MG/DL       Allergies   Allergen Reactions    Penicillins Hives     Immunization History   Administered Date(s) Administered    COVID-19, Pfizer Purple top, DILUTE for use, 12+ yrs, 30mcg/0.3mL dose 09/10/2021, 10/01/2021    Tdap (Boostrix, Adacel) 11/10/2016       Recent Vital Data:  Patient Vitals for the past 24 hrs:   Temp Pulse Resp BP SpO2   06/07/22 1506 98.6 °F (37 °C) 88 18 113/74 95 %   06/07/22 1107 97.9 °F (36.6 °C) (!) 117 18 114/88 96 %   06/07/22 0722 97.3 °F (36.3 °C) 91 18 123/86 96 %   06/07/22 0253 97.9 °F (36.6 °C) 63 12 114/64 95 %   06/06/22 2344 98.6 °F (37 °C) 69 12 102/61 93 %   06/06/22 2018 98.1 °F (36.7 °C) 53 16 131/80 98 %   06/06/22 1936 -- 74 -- -- --   06/06/22 1526 98.4 °F (36.9 °C) 76 20 129/84 97 %       Oxygen Therapy  SpO2: 95 %  O2 Device: None (Room air)    Estimated body mass index is 28.89 kg/m² as calculated from the following:    Height as of this encounter: 5' 8\" (1.727 m). Weight as of this encounter: 190 lb (86.2 kg). No intake or output data in the 24 hours ending 06/07/22 1520      Physical Exam:    General:    Well nourished. No overt distress  Head:  Normocephalic, atraumatic  Eyes:  Sclerae appear normal.  Pupils equally round. HENT:  Nares appear normal, no drainage. Moist mucous membranes  Neck:  No restricted ROM. Trachea midline  CV:   RRR. No m/r/g. No JVD  Lungs:   CTAB. No wheezing, rhonchi, or rales. Even, unlabored  Abdomen:   Soft, nontender, nondistended. Extremities: Warm and dry. No cyanosis or clubbing. No edema. Skin:     No rashes. Normal coloration  Neuro:  CN II-XII grossly intact. Psych:  Normal mood and affect. Signed: Yadira Flowers DO    Part of this note may have been written by using a voice dictation software. The note has been proof read but may still contain some grammatical/other typographical errors.

## 2023-03-01 ENCOUNTER — TELEPHONE (OUTPATIENT)
Dept: UROLOGY | Age: 58
End: 2023-03-01

## 2023-03-10 ENCOUNTER — TELEMEDICINE (OUTPATIENT)
Dept: FAMILY MEDICINE CLINIC | Facility: CLINIC | Age: 58
End: 2023-03-10
Payer: COMMERCIAL

## 2023-03-10 DIAGNOSIS — R10.9 RIGHT FLANK PAIN: Primary | ICD-10-CM

## 2023-03-10 DIAGNOSIS — Z87.448 HISTORY OF SIMPLE RENAL CYST: ICD-10-CM

## 2023-03-10 DIAGNOSIS — N28.1 RENAL CYST: ICD-10-CM

## 2023-03-10 PROCEDURE — 99212 OFFICE O/P EST SF 10 MIN: CPT | Performed by: FAMILY MEDICINE

## 2023-03-10 SDOH — ECONOMIC STABILITY: FOOD INSECURITY: WITHIN THE PAST 12 MONTHS, YOU WORRIED THAT YOUR FOOD WOULD RUN OUT BEFORE YOU GOT MONEY TO BUY MORE.: NEVER TRUE

## 2023-03-10 SDOH — ECONOMIC STABILITY: HOUSING INSECURITY
IN THE LAST 12 MONTHS, WAS THERE A TIME WHEN YOU DID NOT HAVE A STEADY PLACE TO SLEEP OR SLEPT IN A SHELTER (INCLUDING NOW)?: NO

## 2023-03-10 SDOH — ECONOMIC STABILITY: INCOME INSECURITY: HOW HARD IS IT FOR YOU TO PAY FOR THE VERY BASICS LIKE FOOD, HOUSING, MEDICAL CARE, AND HEATING?: NOT HARD AT ALL

## 2023-03-10 SDOH — ECONOMIC STABILITY: TRANSPORTATION INSECURITY
IN THE PAST 12 MONTHS, HAS LACK OF TRANSPORTATION KEPT YOU FROM MEETINGS, WORK, OR FROM GETTING THINGS NEEDED FOR DAILY LIVING?: NO

## 2023-03-10 SDOH — ECONOMIC STABILITY: FOOD INSECURITY: WITHIN THE PAST 12 MONTHS, THE FOOD YOU BOUGHT JUST DIDN'T LAST AND YOU DIDN'T HAVE MONEY TO GET MORE.: NEVER TRUE

## 2023-03-10 ASSESSMENT — ENCOUNTER SYMPTOMS
BACK PAIN: 1
RESPIRATORY NEGATIVE: 1
GASTROINTESTINAL NEGATIVE: 1

## 2023-03-10 NOTE — PROGRESS NOTES
3/10/2023    TELEHEALTH EVALUATION -- Audio/Visual (During JIKAX-95 public health emergency)    HPI:    Yolanda Quiroz (:  1965) has requested an audio/video evaluation for the following concern(s):    Has a history of renal cyst on left that required drainage. For several weeks has had constant soreness in the right flank. States it is the same pain he had with previous cyst. Back pain is not affected by activity. No fever. No urinary symptoms. Past Medical History:   Diagnosis Date    Alcoholism /alcohol abuse     Anxiety     Cardiomyopathy ([facility] [address] 2016    Diverticular disease     colon resection with no symptoms since surgery    Essential hypertension     managed with med. GERD (gastroesophageal reflux disease)     takes medication but not altogether relieved    GI bleed 2/15/2020    Hiatal hernia with gastroesophageal reflux 2013    Hypertension     managed with med. Review of Systems   Constitutional: Negative. Respiratory: Negative. Cardiovascular: Negative. Gastrointestinal: Negative. Genitourinary: Negative. Musculoskeletal:  Positive for back pain (right flank). Neurological: Negative. Prior to Visit Medications    Medication Sig Taking? Authorizing Provider   ascorbic acid (VITAMIN C) 1000 MG tablet Take 1,000 mg by mouth   Ar Automatic Reconciliation   cyanocobalamin 1000 MCG tablet Take 1,000 mcg by mouth daily  Patient not taking: Reported on 6/3/2022  Ar Automatic Reconciliation       Social History     Tobacco Use    Smoking status: Former     Types: Cigarettes     Quit date: 2015     Years since quittin.2    Smokeless tobacco: Never   Substance Use Topics    Alcohol use: No     Alcohol/week: 0.0 standard drinks    Drug use:  No            PHYSICAL EXAMINATION:  [ INSTRUCTIONS:  \"[x]\" Indicates a positive item  \"[]\" Indicates a negative item  -- DELETE ALL ITEMS NOT EXAMINED]  Vital Signs: (As obtained by patient/caregiver or practitioner observation)    Blood pressure-  Heart rate-    Respiratory rate-    Temperature-  Pulse oximetry-     Constitutional: [x] Appears well-developed and well-nourished [] No apparent distress      [] Abnormal-   Mental status  [x] Alert and awake  [] Oriented to person/place/time []Able to follow commands      Eyes:  EOM    []  Normal  [] Abnormal-  Sclera  []  Normal  [] Abnormal -         Discharge []  None visible  [] Abnormal -    HENT:   [] Normocephalic, atraumatic. [] Abnormal   [] Mouth/Throat: Mucous membranes are moist.     External Ears [] Normal  [] Abnormal-     Neck: [] No visualized mass     Pulmonary/Chest: [x] Respiratory effort normal.  [] No visualized signs of difficulty breathing or respiratory distress        [] Abnormal-      Musculoskeletal:   [] Normal gait with no signs of ataxia         [] Normal range of motion of neck        [] Abnormal-       Neurological:        [x] No Facial Asymmetry (Cranial nerve 7 motor function) (limited exam to video visit)          [] No gaze palsy        [] Abnormal-         Skin:        [] No significant exanthematous lesions or discoloration noted on facial skin         [] Abnormal-            Psychiatric:       [x] Normal Affect [] No Hallucinations        [] Abnormal-     Other pertinent observable physical exam findings-     ASSESSMENT/PLAN:  1. Right flank pain      2. History of simple renal cyst    US is ordered and referral to urologist if indicated. Return in about 4 weeks (around 4/7/2023), or if symptoms worsen or fail to improve. Brenna Graft, was evaluated through a synchronous (real-time) audio-video encounter. The patient (or guardian if applicable) is aware that this is a billable service, which includes applicable co-pays. This Virtual Visit was conducted with patient's (and/or legal guardian's) consent.  The visit was conducted pursuant to the emergency declaration under the [facility] Act, [facility] waiver authority and the [employer] Act. Patient identification was verified, and a caregiver was present when appropriate. The patient was located at Home: [address] [place] [address]  Provider was located at [facility] [address] [phone]): [phone]  [facility],  [address] [place] [address] Drive        Total time spent on this encounter: Not billed by time    --Moon Gomez MD on 3/10/2023 at 12:58 PM    An electronic signature was used to authenticate this note.

## 2023-03-17 ENCOUNTER — HOSPITAL ENCOUNTER (OUTPATIENT)
Dept: ULTRASOUND IMAGING | Age: 58
Discharge: HOME | End: 2023-03-20

## 2023-03-17 DIAGNOSIS — R10.9 RIGHT FLANK PAIN: ICD-10-CM

## 2023-03-17 DIAGNOSIS — N28.1 RENAL CYST: ICD-10-CM

## 2023-03-21 ENCOUNTER — TELEPHONE (OUTPATIENT)
Dept: FAMILY MEDICINE CLINIC | Facility: CLINIC | Age: 58
End: 2023-03-21

## 2023-03-21 NOTE — TELEPHONE ENCOUNTER
----- Message from Dianne Simental MD sent at 3/18/2023 12:58 PM EDT -----  Please notify normal except for small kidney cyst; common and should no cause any problem  ----- Message -----  From: Vipul Spencer Incoming Orders Results To Radiant  Sent: 3/17/2023   7:19 PM EDT  To: Dianne Simental MD

## 2023-05-08 ENCOUNTER — OFFICE VISIT (OUTPATIENT)
Dept: UROLOGY | Age: 58
End: 2023-05-08
Payer: COMMERCIAL

## 2023-05-08 DIAGNOSIS — N28.1 RENAL CYST: Primary | ICD-10-CM

## 2023-05-08 LAB
BILIRUBIN, URINE, POC: NEGATIVE
BLOOD URINE, POC: NEGATIVE
GLUCOSE URINE, POC: NEGATIVE
KETONES, URINE, POC: NEGATIVE
LEUKOCYTE ESTERASE, URINE, POC: NEGATIVE
NITRITE, URINE, POC: NEGATIVE
PH, URINE, POC: 8.5 (ref 4.6–8)
PROTEIN,URINE, POC: ABNORMAL
SPECIFIC GRAVITY, URINE, POC: 1.01 (ref 1–1.03)
URINALYSIS CLARITY, POC: ABNORMAL
URINALYSIS COLOR, POC: ABNORMAL
UROBILINOGEN, POC: ABNORMAL

## 2023-05-08 PROCEDURE — 81003 URINALYSIS AUTO W/O SCOPE: CPT | Performed by: UROLOGY

## 2023-05-08 PROCEDURE — 99204 OFFICE O/P NEW MOD 45 MIN: CPT | Performed by: UROLOGY

## 2023-05-08 RX ORDER — ASCORBIC ACID 500 MG
500 TABLET ORAL DAILY
COMMUNITY

## 2023-05-08 ASSESSMENT — ENCOUNTER SYMPTOMS
EYES NEGATIVE: 1
RESPIRATORY NEGATIVE: 1

## 2023-05-08 NOTE — PROGRESS NOTES
[address], [address]  [phone]          Hemant Mg  : 1965    Chief Complaint   Patient presents with    Other     Renal cyst          HPI     Hemant Mg is a 62 y.o. male    The patient had ablation of a 6 cm right renal cyst back in . This was done by Dr. Demario Obrien in interventional radiology. Prior to that he was having some flank pain. Recently his flank pain came back and an ultrasound was obtained. This showed a 2.1 cm simple cyst right kidney. His flank pain is now resolved. He denies any voiding symptoms. Past Medical History:   Diagnosis Date    Alcoholism /alcohol abuse     Anxiety     Cardiomyopathy ([address] 2016    Diverticular disease     colon resection with no symptoms since surgery    Essential hypertension     managed with med. GERD (gastroesophageal reflux disease)     takes medication but not altogether relieved    GI bleed 2/15/2020    Hiatal hernia with gastroesophageal reflux 2013    Hypertension     managed with med. Past Surgical History:   Procedure Laterality Date    COLONOSCOPY  2020     2 in 2020    COLONOSCOPY  3/2018,     10 years    COLONOSCOPY N/A 2020    COLONOSCOPY /29/ Room 630 performed by Luz Roper MD at [facility] ENDOSCOPY    COLONOSCOPY N/A 2020    COLONOSCOPY ROOM 517 performed by Christiana Dominguez MD at [address] [place] [address]      renal cyst    HERNIA REPAIR      Surendra fundiplication    TOTAL COLECTOMY  2008     Current Outpatient Medications   Medication Sig Dispense Refill    vitamin C (ASCORBIC ACID) 500 MG tablet Take 1 tablet by mouth daily      ascorbic acid (VITAMIN C) 1000 MG tablet Take 1,000 mg by mouth       cyanocobalamin 1000 MCG tablet Take 1,000 mcg by mouth daily (Patient not taking: Reported on 6/3/2022)       No current facility-administered medications for this visit.      Allergies   Allergen Reactions    Penicillins Hives

## 2023-12-14 ENCOUNTER — OFFICE VISIT (OUTPATIENT)
Dept: FAMILY MEDICINE CLINIC | Facility: CLINIC | Age: 58
End: 2023-12-14
Payer: COMMERCIAL

## 2023-12-14 VITALS
OXYGEN SATURATION: 98 % | SYSTOLIC BLOOD PRESSURE: 122 MMHG | BODY MASS INDEX: 28.64 KG/M2 | HEIGHT: 68 IN | DIASTOLIC BLOOD PRESSURE: 86 MMHG | HEART RATE: 97 BPM | WEIGHT: 189 LBS

## 2023-12-14 DIAGNOSIS — J01.90 ACUTE BACTERIAL SINUSITIS: Primary | ICD-10-CM

## 2023-12-14 DIAGNOSIS — B96.89 ACUTE BACTERIAL SINUSITIS: Primary | ICD-10-CM

## 2023-12-14 DIAGNOSIS — F32.A ANXIETY AND DEPRESSION: ICD-10-CM

## 2023-12-14 DIAGNOSIS — F41.9 ANXIETY AND DEPRESSION: ICD-10-CM

## 2023-12-14 PROCEDURE — 99213 OFFICE O/P EST LOW 20 MIN: CPT | Performed by: FAMILY MEDICINE

## 2023-12-14 PROCEDURE — 3074F SYST BP LT 130 MM HG: CPT | Performed by: FAMILY MEDICINE

## 2023-12-14 PROCEDURE — 3079F DIAST BP 80-89 MM HG: CPT | Performed by: FAMILY MEDICINE

## 2023-12-14 RX ORDER — BENZONATATE 200 MG/1
200 CAPSULE ORAL 3 TIMES DAILY
COMMUNITY
Start: 2023-12-05

## 2023-12-14 RX ORDER — AMOXICILLIN 500 MG/1
500 TABLET, FILM COATED ORAL 2 TIMES DAILY
COMMUNITY
Start: 2023-12-09

## 2023-12-14 RX ORDER — AZITHROMYCIN 250 MG/1
250 TABLET, FILM COATED ORAL SEE ADMIN INSTRUCTIONS
Qty: 6 TABLET | Refills: 0 | Status: SHIPPED | OUTPATIENT
Start: 2023-12-14 | End: 2023-12-19

## 2023-12-14 RX ORDER — ALBUTEROL SULFATE 90 UG/1
AEROSOL, METERED RESPIRATORY (INHALATION)
COMMUNITY
Start: 2023-12-09

## 2023-12-14 RX ORDER — PREDNISONE 10 MG/1
1 TABLET ORAL SEE ADMIN INSTRUCTIONS
Qty: 1 EACH | Refills: 0 | Status: SHIPPED | OUTPATIENT
Start: 2023-12-14

## 2023-12-14 RX ORDER — ACETAMINOPHEN, CHLORPHENIRAMINE MALEATE, AND PHENYLEPHRINE HCL 325; 4; 10 MG/1; MG/1; MG/1
TABLET, MULTILAYER ORAL
Qty: 30 TABLET | Refills: 0 | Status: SHIPPED | OUTPATIENT
Start: 2023-12-14

## 2023-12-14 ASSESSMENT — PATIENT HEALTH QUESTIONNAIRE - PHQ9
SUM OF ALL RESPONSES TO PHQ QUESTIONS 1-9: 0
5. POOR APPETITE OR OVEREATING: 0
4. FEELING TIRED OR HAVING LITTLE ENERGY: 0
10. IF YOU CHECKED OFF ANY PROBLEMS, HOW DIFFICULT HAVE THESE PROBLEMS MADE IT FOR YOU TO DO YOUR WORK, TAKE CARE OF THINGS AT HOME, OR GET ALONG WITH OTHER PEOPLE: 0
SUM OF ALL RESPONSES TO PHQ QUESTIONS 1-9: 0
7. TROUBLE CONCENTRATING ON THINGS, SUCH AS READING THE NEWSPAPER OR WATCHING TELEVISION: 0
8. MOVING OR SPEAKING SO SLOWLY THAT OTHER PEOPLE COULD HAVE NOTICED. OR THE OPPOSITE, BEING SO FIGETY OR RESTLESS THAT YOU HAVE BEEN MOVING AROUND A LOT MORE THAN USUAL: 0
1. LITTLE INTEREST OR PLEASURE IN DOING THINGS: 0
SUM OF ALL RESPONSES TO PHQ QUESTIONS 1-9: 0
SUM OF ALL RESPONSES TO PHQ QUESTIONS 1-9: 0
3. TROUBLE FALLING OR STAYING ASLEEP: 0
SUM OF ALL RESPONSES TO PHQ9 QUESTIONS 1 & 2: 0
2. FEELING DOWN, DEPRESSED OR HOPELESS: 0
6. FEELING BAD ABOUT YOURSELF - OR THAT YOU ARE A FAILURE OR HAVE LET YOURSELF OR YOUR FAMILY DOWN: 0
9. THOUGHTS THAT YOU WOULD BE BETTER OFF DEAD, OR OF HURTING YOURSELF: 0

## 2023-12-14 ASSESSMENT — COLUMBIA-SUICIDE SEVERITY RATING SCALE - C-SSRS
4. HAVE YOU HAD THESE THOUGHTS AND HAD SOME INTENTION OF ACTING ON THEM?: NO
7. DID THIS OCCUR IN THE LAST THREE MONTHS: NO
3. HAVE YOU BEEN THINKING ABOUT HOW YOU MIGHT KILL YOURSELF?: NO
5. HAVE YOU STARTED TO WORK OUT OR WORKED OUT THE DETAILS OF HOW TO KILL YOURSELF? DO YOU INTEND TO CARRY OUT THIS PLAN?: NO

## 2023-12-14 NOTE — PROGRESS NOTES
HISTORY OF PRESENT ILLNESS    Reny Doherty is a 62 y.o. male. HPI  Chief Complaint   Patient presents with    Congestion     Sinus and chest congestion, cough x 2 weeks      Anxiety     Requesting a prescription for zoloft 50 mg      See above. Persistent sinus congestion for 2 weeks. Nasal discharge is discolored; mainly drains in the morning. No sore throat or fever. Periodic cough occasionally productive of clear mucus. No SOB or wheezing. History of anxiety and depression. Has had increased stress at work and feels like symptoms are returning. Has responded well and promptly to Zoloft in the past.      Current Outpatient Medications on File Prior to Visit   Medication Sig Dispense Refill    albuterol sulfate HFA (PROVENTIL;VENTOLIN;PROAIR) 108 (90 Base) MCG/ACT inhaler INHALE 2 PUFFS BY MOUTH EVERY 4 TO 6 HOURS AS NEEDED FOR WHEEZING OR COUGH      amoxicillin (AMOXIL) 500 MG tablet Take 1 tablet by mouth 2 times daily      ascorbic acid (VITAMIN C) 1000 MG tablet Take 1 tablet by mouth      benzonatate (TESSALON) 200 MG capsule Take 1 capsule by mouth 3 times daily (Patient not taking: Reported on 12/14/2023)      cyanocobalamin 1000 MCG tablet Take 1,000 mcg by mouth daily (Patient not taking: Reported on 6/3/2022)       No current facility-administered medications on file prior to visit. Past Medical History:   Diagnosis Date    Alcoholism /alcohol abuse     Anxiety     Cardiomyopathy ([address]) 6/30/2016    Diverticular disease     colon resection with no symptoms since surgery    Essential hypertension     managed with med. GERD (gastroesophageal reflux disease)     takes medication but not altogether relieved    GI bleed 2/15/2020    Hiatal hernia with gastroesophageal reflux 2/5/2013    Hypertension     managed with med. Review of Systems   Constitutional: Negative. HENT:  Positive for congestion, postnasal drip, rhinorrhea and sinus pressure.  Negative for ear pain, facial swelling,

## 2024-03-04 DIAGNOSIS — Z00.00 ROUTINE GENERAL MEDICAL EXAMINATION AT A HEALTH CARE FACILITY: Primary | ICD-10-CM

## 2024-03-05 ENCOUNTER — NURSE ONLY (OUTPATIENT)
Dept: FAMILY MEDICINE CLINIC | Facility: CLINIC | Age: 59
End: 2024-03-05

## 2024-03-05 DIAGNOSIS — Z00.00 ROUTINE GENERAL MEDICAL EXAMINATION AT A HEALTH CARE FACILITY: ICD-10-CM

## 2024-03-05 LAB
ALBUMIN SERPL-MCNC: 3.9 G/DL (ref 3.5–5)
ALBUMIN/GLOB SERPL: 1.4 (ref 0.4–1.6)
ALP SERPL-CCNC: 41 U/L (ref 50–136)
ALT SERPL-CCNC: 22 U/L (ref 12–65)
ANION GAP SERPL CALC-SCNC: 6 MMOL/L (ref 2–11)
APPEARANCE UR: CLEAR
AST SERPL-CCNC: 13 U/L (ref 15–37)
BACTERIA URNS QL MICRO: NEGATIVE /HPF
BASOPHILS # BLD: 0 K/UL (ref 0–0.2)
BASOPHILS NFR BLD: 1 % (ref 0–2)
BILIRUB SERPL-MCNC: 0.5 MG/DL (ref 0.2–1.1)
BILIRUB UR QL: NEGATIVE
BUN SERPL-MCNC: 13 MG/DL (ref 6–23)
CALCIUM SERPL-MCNC: 9.2 MG/DL (ref 8.3–10.4)
CASTS URNS QL MICRO: ABNORMAL /LPF (ref 0–2)
CHLORIDE SERPL-SCNC: 109 MMOL/L (ref 103–113)
CHOLEST SERPL-MCNC: 211 MG/DL
CO2 SERPL-SCNC: 26 MMOL/L (ref 21–32)
COLOR UR: ABNORMAL
CREAT SERPL-MCNC: 0.8 MG/DL (ref 0.8–1.5)
DIFFERENTIAL METHOD BLD: ABNORMAL
EOSINOPHIL # BLD: 0.1 K/UL (ref 0–0.8)
EOSINOPHIL NFR BLD: 2 % (ref 0.5–7.8)
EPI CELLS #/AREA URNS HPF: ABNORMAL /HPF (ref 0–5)
ERYTHROCYTE [DISTWIDTH] IN BLOOD BY AUTOMATED COUNT: 12.9 % (ref 11.9–14.6)
GLOBULIN SER CALC-MCNC: 2.7 G/DL (ref 2.8–4.5)
GLUCOSE SERPL-MCNC: 95 MG/DL (ref 65–100)
GLUCOSE UR STRIP.AUTO-MCNC: NEGATIVE MG/DL
HCT VFR BLD AUTO: 44.5 % (ref 41.1–50.3)
HDLC SERPL-MCNC: 53 MG/DL (ref 40–60)
HDLC SERPL: 4
HGB BLD-MCNC: 14.7 G/DL (ref 13.6–17.2)
HGB UR QL STRIP: NEGATIVE
IMM GRANULOCYTES # BLD AUTO: 0 K/UL (ref 0–0.5)
IMM GRANULOCYTES NFR BLD AUTO: 0 % (ref 0–5)
KETONES UR QL STRIP.AUTO: ABNORMAL MG/DL
LDLC SERPL CALC-MCNC: 145 MG/DL
LEUKOCYTE ESTERASE UR QL STRIP.AUTO: ABNORMAL
LYMPHOCYTES # BLD: 1.6 K/UL (ref 0.5–4.6)
LYMPHOCYTES NFR BLD: 43 % (ref 13–44)
MCH RBC QN AUTO: 31.9 PG (ref 26.1–32.9)
MCHC RBC AUTO-ENTMCNC: 33 G/DL (ref 31.4–35)
MCV RBC AUTO: 96.5 FL (ref 82–102)
MONOCYTES # BLD: 0.3 K/UL (ref 0.1–1.3)
MONOCYTES NFR BLD: 9 % (ref 4–12)
NEUTS SEG # BLD: 1.7 K/UL (ref 1.7–8.2)
NEUTS SEG NFR BLD: 45 % (ref 43–78)
NITRITE UR QL STRIP.AUTO: NEGATIVE
NRBC # BLD: 0 K/UL (ref 0–0.2)
PH UR STRIP: 5.5 (ref 5–9)
PLATELET # BLD AUTO: 214 K/UL (ref 150–450)
PMV BLD AUTO: 9.5 FL (ref 9.4–12.3)
POTASSIUM SERPL-SCNC: 4.2 MMOL/L (ref 3.5–5.1)
PROT SERPL-MCNC: 6.6 G/DL (ref 6.3–8.2)
PROT UR STRIP-MCNC: NEGATIVE MG/DL
PSA SERPL-MCNC: 0.7 NG/ML
RBC # BLD AUTO: 4.61 M/UL (ref 4.23–5.6)
RBC #/AREA URNS HPF: ABNORMAL /HPF (ref 0–5)
SODIUM SERPL-SCNC: 141 MMOL/L (ref 136–146)
SP GR UR REFRACTOMETRY: 1.02 (ref 1–1.02)
TRIGL SERPL-MCNC: 65 MG/DL (ref 35–150)
TSH, 3RD GENERATION: 1.52 UIU/ML (ref 0.36–3.74)
UROBILINOGEN UR QL STRIP.AUTO: 0.2 EU/DL (ref 0.2–1)
VLDLC SERPL CALC-MCNC: 13 MG/DL (ref 6–23)
WBC # BLD AUTO: 3.8 K/UL (ref 4.3–11.1)
WBC URNS QL MICRO: ABNORMAL /HPF (ref 0–4)

## 2024-03-11 ENCOUNTER — OFFICE VISIT (OUTPATIENT)
Dept: FAMILY MEDICINE CLINIC | Facility: CLINIC | Age: 59
End: 2024-03-11
Payer: COMMERCIAL

## 2024-03-11 VITALS
DIASTOLIC BLOOD PRESSURE: 82 MMHG | WEIGHT: 193 LBS | BODY MASS INDEX: 29.35 KG/M2 | HEART RATE: 91 BPM | SYSTOLIC BLOOD PRESSURE: 124 MMHG | OXYGEN SATURATION: 97 % | TEMPERATURE: 98.2 F

## 2024-03-11 DIAGNOSIS — F32.A MILD DEPRESSION: ICD-10-CM

## 2024-03-11 DIAGNOSIS — Z00.00 ROUTINE HEALTH MAINTENANCE: Primary | ICD-10-CM

## 2024-03-11 PROCEDURE — 99396 PREV VISIT EST AGE 40-64: CPT | Performed by: FAMILY MEDICINE

## 2024-03-11 PROCEDURE — 3074F SYST BP LT 130 MM HG: CPT | Performed by: FAMILY MEDICINE

## 2024-03-11 PROCEDURE — 3079F DIAST BP 80-89 MM HG: CPT | Performed by: FAMILY MEDICINE

## 2024-03-11 SDOH — ECONOMIC STABILITY: INCOME INSECURITY: HOW HARD IS IT FOR YOU TO PAY FOR THE VERY BASICS LIKE FOOD, HOUSING, MEDICAL CARE, AND HEATING?: NOT HARD AT ALL

## 2024-03-11 SDOH — ECONOMIC STABILITY: FOOD INSECURITY: WITHIN THE PAST 12 MONTHS, THE FOOD YOU BOUGHT JUST DIDN'T LAST AND YOU DIDN'T HAVE MONEY TO GET MORE.: NEVER TRUE

## 2024-03-11 SDOH — ECONOMIC STABILITY: FOOD INSECURITY: WITHIN THE PAST 12 MONTHS, YOU WORRIED THAT YOUR FOOD WOULD RUN OUT BEFORE YOU GOT MONEY TO BUY MORE.: NEVER TRUE

## 2024-03-11 ASSESSMENT — PATIENT HEALTH QUESTIONNAIRE - PHQ9
SUM OF ALL RESPONSES TO PHQ QUESTIONS 1-9: 0
6. FEELING BAD ABOUT YOURSELF - OR THAT YOU ARE A FAILURE OR HAVE LET YOURSELF OR YOUR FAMILY DOWN: 0
3. TROUBLE FALLING OR STAYING ASLEEP: 0
SUM OF ALL RESPONSES TO PHQ QUESTIONS 1-9: 0
2. FEELING DOWN, DEPRESSED OR HOPELESS: 0
10. IF YOU CHECKED OFF ANY PROBLEMS, HOW DIFFICULT HAVE THESE PROBLEMS MADE IT FOR YOU TO DO YOUR WORK, TAKE CARE OF THINGS AT HOME, OR GET ALONG WITH OTHER PEOPLE: 0
4. FEELING TIRED OR HAVING LITTLE ENERGY: 0
9. THOUGHTS THAT YOU WOULD BE BETTER OFF DEAD, OR OF HURTING YOURSELF: 0
SUM OF ALL RESPONSES TO PHQ QUESTIONS 1-9: 0
5. POOR APPETITE OR OVEREATING: 0
7. TROUBLE CONCENTRATING ON THINGS, SUCH AS READING THE NEWSPAPER OR WATCHING TELEVISION: 0
1. LITTLE INTEREST OR PLEASURE IN DOING THINGS: 0
SUM OF ALL RESPONSES TO PHQ9 QUESTIONS 1 & 2: 0
SUM OF ALL RESPONSES TO PHQ QUESTIONS 1-9: 0
8. MOVING OR SPEAKING SO SLOWLY THAT OTHER PEOPLE COULD HAVE NOTICED. OR THE OPPOSITE, BEING SO FIGETY OR RESTLESS THAT YOU HAVE BEEN MOVING AROUND A LOT MORE THAN USUAL: 0

## 2024-03-11 ASSESSMENT — ENCOUNTER SYMPTOMS
GASTROINTESTINAL NEGATIVE: 1
RESPIRATORY NEGATIVE: 1
EYES NEGATIVE: 1
ALLERGIC/IMMUNOLOGIC NEGATIVE: 1

## 2024-03-11 NOTE — PROGRESS NOTES
normal.      Left Ear: Tympanic membrane normal.      Nose: Nose normal.      Mouth/Throat:      Mouth: Mucous membranes are moist.      Pharynx: Oropharynx is clear.   Eyes:      Extraocular Movements: Extraocular movements intact.      Conjunctiva/sclera: Conjunctivae normal.      Pupils: Pupils are equal, round, and reactive to light.   Neck:      Vascular: No carotid bruit.   Cardiovascular:      Rate and Rhythm: Normal rate and regular rhythm.      Heart sounds: Normal heart sounds.   Pulmonary:      Breath sounds: Normal breath sounds.   Abdominal:      General: Bowel sounds are normal. There is no distension.      Palpations: Abdomen is soft. There is no mass.      Tenderness: There is no abdominal tenderness. There is no guarding.      Hernia: No hernia is present. There is no hernia in the left inguinal area or right inguinal area.   Genitourinary:     Pubic Area: No rash.       Penis: Normal.       Testes: Normal.      Epididymis:      Right: Normal.      Left: Normal.      Prostate: Normal.      Rectum: Normal.   Musculoskeletal:         General: No swelling or tenderness. Normal range of motion.      Cervical back: Neck supple.   Lymphadenopathy:      Cervical: No cervical adenopathy.   Skin:     General: Skin is warm.      Findings: No rash.   Neurological:      Cranial Nerves: No cranial nerve deficit.      Deep Tendon Reflexes: Reflexes normal.   Psychiatric:         Mood and Affect: Mood normal.          ASSESSMENT and PLAN    Sherman was seen today for annual exam.    Diagnoses and all orders for this visit:    Routine health maintenance    Mild depression     Reviewed labs and health care maintenance with emphasis on weight loss.  Discussed history and medications with patient. Continue current measures. Follow up if side effects occur or if new symptoms develop.      Return in about 1 year (around 3/11/2025), or if symptoms worsen or fail to improve.    DAMION RUIZ JR, MD

## 2024-04-30 ENCOUNTER — OFFICE VISIT (OUTPATIENT)
Dept: FAMILY MEDICINE CLINIC | Facility: CLINIC | Age: 59
End: 2024-04-30
Payer: COMMERCIAL

## 2024-04-30 VITALS
WEIGHT: 193 LBS | TEMPERATURE: 97.7 F | BODY MASS INDEX: 29.35 KG/M2 | OXYGEN SATURATION: 98 % | DIASTOLIC BLOOD PRESSURE: 80 MMHG | SYSTOLIC BLOOD PRESSURE: 130 MMHG | HEART RATE: 70 BPM

## 2024-04-30 DIAGNOSIS — B96.89 ACUTE BACTERIAL SINUSITIS: Primary | ICD-10-CM

## 2024-04-30 DIAGNOSIS — J01.90 ACUTE BACTERIAL SINUSITIS: Primary | ICD-10-CM

## 2024-04-30 PROCEDURE — 3075F SYST BP GE 130 - 139MM HG: CPT | Performed by: FAMILY MEDICINE

## 2024-04-30 PROCEDURE — 99213 OFFICE O/P EST LOW 20 MIN: CPT | Performed by: FAMILY MEDICINE

## 2024-04-30 PROCEDURE — 3079F DIAST BP 80-89 MM HG: CPT | Performed by: FAMILY MEDICINE

## 2024-04-30 RX ORDER — ACETAMINOPHEN, CHLORPHENIRAMINE MALEATE, AND PHENYLEPHRINE HCL 325; 4; 10 MG/1; MG/1; MG/1
TABLET, MULTILAYER ORAL
Qty: 30 TABLET | Refills: 0 | Status: SHIPPED | OUTPATIENT
Start: 2024-04-30

## 2024-04-30 RX ORDER — DOXYCYCLINE HYCLATE 100 MG
100 TABLET ORAL 2 TIMES DAILY
Qty: 20 TABLET | Refills: 0 | Status: SHIPPED | OUTPATIENT
Start: 2024-04-30 | End: 2024-05-10

## 2024-04-30 RX ORDER — PREDNISONE 10 MG/1
1 TABLET ORAL SEE ADMIN INSTRUCTIONS
Qty: 1 EACH | Refills: 0 | Status: SHIPPED | OUTPATIENT
Start: 2024-04-30

## 2024-04-30 ASSESSMENT — ENCOUNTER SYMPTOMS
GASTROINTESTINAL NEGATIVE: 1
RESPIRATORY NEGATIVE: 1
SINUS PAIN: 0
FACIAL SWELLING: 0
RHINORRHEA: 1
TROUBLE SWALLOWING: 0
EYES NEGATIVE: 1
SORE THROAT: 0
SINUS PRESSURE: 1

## 2024-04-30 NOTE — PROGRESS NOTES
HISTORY OF PRESENT ILLNESS    Sherman Jones is a 59 y.o. male.  HPI  Chief Complaint   Patient presents with    Sinusitis     See above. URI symptoms resolved a few months ago. About a week ago developed worsening sinus congestion and pressure. Frequent green nasal discharge. No fever or cough. No sore throat or ear pain.      Current Outpatient Medications on File Prior to Visit   Medication Sig Dispense Refill    albuterol sulfate HFA (PROVENTIL;VENTOLIN;PROAIR) 108 (90 Base) MCG/ACT inhaler INHALE 2 PUFFS BY MOUTH EVERY 4 TO 6 HOURS AS NEEDED FOR WHEEZING OR COUGH      sertraline (ZOLOFT) 50 MG tablet Take 1 tablet by mouth daily 90 tablet 1    ascorbic acid (VITAMIN C) 1000 MG tablet Take 1 tablet by mouth      cyanocobalamin 1000 MCG tablet Take 1 tablet by mouth daily       No current facility-administered medications on file prior to visit.     Past Medical History:   Diagnosis Date    Alcoholism /alcohol abuse     Anxiety     Cardiomyopathy ([facility]) 6/30/2016    Diverticular disease     colon resection with no symptoms since surgery    Essential hypertension     managed with med.      GERD (gastroesophageal reflux disease)     takes medication but not altogether relieved    GI bleed 2/15/2020    Hiatal hernia with gastroesophageal reflux 2/5/2013    Hypertension     managed with med.        Review of Systems   Constitutional: Negative.    HENT:  Positive for congestion, postnasal drip, rhinorrhea and sinus pressure. Negative for ear pain, facial swelling, mouth sores, nosebleeds, sinus pain, sore throat and trouble swallowing.    Eyes: Negative.    Respiratory: Negative.     Cardiovascular: Negative.    Gastrointestinal: Negative.    Genitourinary: Negative.    Musculoskeletal: Negative.    Allergic/Immunologic: Positive for environmental allergies. Negative for food allergies and immunocompromised state.   Neurological: Negative.    Psychiatric/Behavioral:  Negative for dysphoric mood (controlled).

## 2024-05-23 ENCOUNTER — OFFICE VISIT (OUTPATIENT)
Dept: FAMILY MEDICINE CLINIC | Facility: CLINIC | Age: 59
End: 2024-05-23
Payer: COMMERCIAL

## 2024-05-23 VITALS
BODY MASS INDEX: 29.19 KG/M2 | WEIGHT: 192 LBS | SYSTOLIC BLOOD PRESSURE: 120 MMHG | DIASTOLIC BLOOD PRESSURE: 80 MMHG | HEART RATE: 82 BPM | OXYGEN SATURATION: 98 % | TEMPERATURE: 97.3 F

## 2024-05-23 DIAGNOSIS — M25.562 ACUTE PAIN OF BOTH KNEES: Primary | ICD-10-CM

## 2024-05-23 DIAGNOSIS — M25.561 ACUTE PAIN OF BOTH KNEES: Primary | ICD-10-CM

## 2024-05-23 PROCEDURE — 99213 OFFICE O/P EST LOW 20 MIN: CPT | Performed by: FAMILY MEDICINE

## 2024-05-23 PROCEDURE — 3079F DIAST BP 80-89 MM HG: CPT | Performed by: FAMILY MEDICINE

## 2024-05-23 PROCEDURE — 3074F SYST BP LT 130 MM HG: CPT | Performed by: FAMILY MEDICINE

## 2024-05-23 PROCEDURE — 96372 THER/PROPH/DIAG INJ SC/IM: CPT | Performed by: FAMILY MEDICINE

## 2024-05-23 RX ORDER — KETOROLAC TROMETHAMINE 30 MG/ML
30 INJECTION, SOLUTION INTRAMUSCULAR; INTRAVENOUS ONCE
Status: COMPLETED | OUTPATIENT
Start: 2024-05-23 | End: 2024-05-23

## 2024-05-23 RX ORDER — PREDNISONE 10 MG/1
1 TABLET ORAL SEE ADMIN INSTRUCTIONS
Qty: 1 EACH | Refills: 0 | Status: SHIPPED | OUTPATIENT
Start: 2024-05-23

## 2024-05-23 RX ORDER — METHYLPREDNISOLONE SODIUM SUCCINATE 40 MG/ML
40 INJECTION, POWDER, LYOPHILIZED, FOR SOLUTION INTRAMUSCULAR; INTRAVENOUS ONCE
Status: COMPLETED | OUTPATIENT
Start: 2024-05-23 | End: 2024-05-23

## 2024-05-23 RX ADMIN — KETOROLAC TROMETHAMINE 30 MG: 30 INJECTION, SOLUTION INTRAMUSCULAR; INTRAVENOUS at 14:38

## 2024-05-23 RX ADMIN — METHYLPREDNISOLONE SODIUM SUCCINATE 40 MG: 40 INJECTION, POWDER, LYOPHILIZED, FOR SOLUTION INTRAMUSCULAR; INTRAVENOUS at 14:39

## 2024-05-23 ASSESSMENT — ENCOUNTER SYMPTOMS
RESPIRATORY NEGATIVE: 1
GASTROINTESTINAL NEGATIVE: 1

## 2024-05-23 NOTE — PROGRESS NOTES
HISTORY OF PRESENT ILLNESS    Sherman Jones is a 59 y.o. male.  HPI  Chief Complaint   Patient presents with    Joint Swelling     Bilateral knee x2 weeks after working in the yard      See above. Onset of knee pain as noted. Had been squatting a lot. Pain is worst on left. Localized to the inner knee. No radiation. No pain at rest. Worst when standing and walking. No numbness or weakness.      Current Outpatient Medications on File Prior to Visit   Medication Sig Dispense Refill    albuterol sulfate HFA (PROVENTIL;VENTOLIN;PROAIR) 108 (90 Base) MCG/ACT inhaler INHALE 2 PUFFS BY MOUTH EVERY 4 TO 6 HOURS AS NEEDED FOR WHEEZING OR COUGH      sertraline (ZOLOFT) 50 MG tablet Take 1 tablet by mouth daily 90 tablet 1    ascorbic acid (VITAMIN C) 1000 MG tablet Take 1 tablet by mouth      cyanocobalamin 1000 MCG tablet Take 1 tablet by mouth daily       No current facility-administered medications on file prior to visit.     Past Medical History:   Diagnosis Date    Alcoholism /alcohol abuse     Anxiety     Cardiomyopathy ([facility]) 6/30/2016    Diverticular disease     colon resection with no symptoms since surgery    Essential hypertension     managed with med.      GERD (gastroesophageal reflux disease)     takes medication but not altogether relieved    GI bleed 2/15/2020    Hiatal hernia with gastroesophageal reflux 2/5/2013    Hypertension     managed with med.        Review of Systems   Constitutional: Negative.    Respiratory: Negative.     Cardiovascular: Negative.    Gastrointestinal: Negative.    Genitourinary: Negative.    Musculoskeletal:  Positive for arthralgias (knee).   Neurological: Negative.       Blood pressure 120/80, pulse 82, temperature 97.3 °F (36.3 °C), weight 87.1 kg (192 lb), SpO2 98 %.    Physical Exam  Vitals and nursing note reviewed.   Constitutional:       Appearance: Normal appearance.   HENT:      Mouth/Throat:      Mouth: Mucous membranes are moist.      Pharynx: Oropharynx is clear.

## 2024-06-03 ENCOUNTER — PATIENT MESSAGE (OUTPATIENT)
Dept: FAMILY MEDICINE CLINIC | Facility: CLINIC | Age: 59
End: 2024-06-03

## 2024-06-03 RX ORDER — MELOXICAM 7.5 MG/1
7.5 TABLET ORAL DAILY
Qty: 30 TABLET | Refills: 3 | Status: SHIPPED | OUTPATIENT
Start: 2024-06-03

## 2024-06-06 ENCOUNTER — HOSPITAL ENCOUNTER (OUTPATIENT)
Dept: PHYSICAL THERAPY | Age: 59
Setting detail: RECURRING SERIES
Discharge: HOME | End: 2024-06-09
Attending: FAMILY MEDICINE
Payer: COMMERCIAL

## 2024-06-06 DIAGNOSIS — M25.561 ACUTE PAIN OF BOTH KNEES: Primary | ICD-10-CM

## 2024-06-06 DIAGNOSIS — M25.562 ACUTE PAIN OF BOTH KNEES: Primary | ICD-10-CM

## 2024-06-06 PROCEDURE — 97161 PT EVAL LOW COMPLEX 20 MIN: CPT

## 2024-06-06 PROCEDURE — 97110 THERAPEUTIC EXERCISES: CPT

## 2024-06-06 ASSESSMENT — PAIN SCALES - GENERAL: PAINLEVEL_OUTOF10: 4

## 2024-06-06 NOTE — PROGRESS NOTES
[facility]   7/2/2024  3:30 PM Mushtaq Graves, PT [facility]   7/9/2024  3:30 PM Mushtaq Graves, PT [facility]   7/11/2024  3:30 PM Mushtaq Graves, PT [facility]

## 2024-06-06 NOTE — THERAPY EVALUATION
tested    Test Results   Sit to stand  Uses arms   Squat  1/2 ROM   Stair Negotiation Step to              Test Results   Timed up and Go Initial: NT     30 second Sit to Stand Initial: NT     Single Leg Balance Right 20 seconds  Left 25 seconds            ASSESSMENT   Initial Assessment:    Sherman Jones presents to physical therapy with decreased strength, ROM, joint mobility, functional mobility. These S/S are consistent with referring diagnosis. Patient will benefit from skilled physical therapy for manual therapeutic techniques (as appropriate), therapeutic exercises and activities, balance and comprehensive home exercises program to address current impairments and functional limitations.   Therapy Problem List: (Impacting functional limitations):    Increased Pain, Decreased Strength, Decreased ROM, Decreased Transfer Abilities, Decreased Functional Mobility, Decreased [facility] with Home Exercise Program, Decreased Posture, Decreased Balance, Decreased Body Mechanics, and Decreased Activity Tolerance/Endurance*   Therapy Prognosis:   Good     Initial Assessment Complexity:   Low Complexity     PLAN   Effective Dates: 6/6/2024 TO Plan of Care/Certification Expiration Date: 08/05/24     Interventions Planned (Treatment may consist of any combination of the following):    Balance Training, Home Exercise Program (HEP), Manual Therapy, Neuromuscular Re-education/Strengthening, Therapeutic Activites, Therapeutic Exercise/Strengthening, and Safety Education and Training   Goals: (Goals have been discussed and agreed upon with patient.)    Goals: (Goals have been discussed and agreed upon with patient.) In Progress Goal Met  Goal Not met   Short-Term Functional Goals: Time Frame: 3 weeks      1.Sherman Jones will be independent with HEP [] [] []   2.Sherman Jones will improve valgus control of bilateral knees during squat [] [] []   3.Sherman Jones will improve bilateral VMO activation during

## 2024-06-11 ENCOUNTER — HOSPITAL ENCOUNTER (OUTPATIENT)
Dept: PHYSICAL THERAPY | Age: 59
Setting detail: RECURRING SERIES
Discharge: HOME | End: 2024-06-14
Attending: FAMILY MEDICINE
Payer: COMMERCIAL

## 2024-06-11 PROCEDURE — 97110 THERAPEUTIC EXERCISES: CPT

## 2024-06-11 NOTE — PROGRESS NOTES
Sherman Jones  : 1965  Primary: [employer] ([employer])  Secondary:  [facility] [place] [address] [place] [address]  Phone: [phone]  Fax: [phone] Plan Frequency: 2 x a week for 6-8 weeks  Plan of Care/Certification Expiration Date: 24        Plan of Care/Certification Expiration Date:  Plan of Care/Certification Expiration Date: 24    Frequency/Duration: Plan Frequency: 2 x a week for 6-8 weeks      Time In/Out:   Time In: 1615  Time Out: 1700    PT Visit Info:    Plan Frequency: 2 x a week for 6-8 weeks      Visit Count:  2    OUTPATIENT PHYSICAL THERAPY:   Treatment Note 2024       Charge Capture   Episode  (Bilateral knee pain)               Treatment Diagnosis:    Acute pain of both knees  Medical/Referring Diagnosis:    Acute pain of both knees [M25.561, M25.562]    Referring Physician:  Turner Eden Jr., MD MD Orders:  PT Eval and Treat   Return MD Appt:  TBD   Date of Onset:  Onset Date: 05/10/24     Allergies:   Penicillins  Restrictions/Precautions:   None      Interventions Planned (Treatment may consist of any combination of the following):     See Assessment Note      Subjective Comments:     Patient reports some improvement with HEP  Initial Pain Level::  Sore    /10  Post Session Pain Level:    sore    /10  Medications Last Reviewed:  2024  Updated Objective Findings:  None Today  Treatment   THERAPEUTIC EXERCISE: (40 minutes):    Exercises per grid below to improve mobility, strength, balance, coordination, and dynamic movement of generalized knee to improve functional bending, lifting, and carrying.  Required minimal visual, verbal, and manual cues to promote proper body alignment, promote proper body posture, and promote proper body mechanics.  Progressed resistance, range, repetitions, and complexity of movement as indicated.   Date:  2024 Date:  2024 Date:     Activity/Exercise Parameters

## 2024-06-13 ENCOUNTER — HOSPITAL ENCOUNTER (OUTPATIENT)
Dept: PHYSICAL THERAPY | Age: 59
Setting detail: RECURRING SERIES
Discharge: HOME | End: 2024-06-16
Attending: FAMILY MEDICINE
Payer: COMMERCIAL

## 2024-06-13 PROCEDURE — 97110 THERAPEUTIC EXERCISES: CPT

## 2024-06-13 NOTE — PROGRESS NOTES
Sherman Jones  : 1965  Primary: [employer] ([employer])  Secondary:  [facility] @ [place] [address] [address] [place] [address]  Phone: [phone]  Fax: [phone] Plan Frequency: 2 x a week for 6-8 weeks  Plan of Care/Certification Expiration Date: 24        Plan of Care/Certification Expiration Date:  Plan of Care/Certification Expiration Date: 24    Frequency/Duration: Plan Frequency: 2 x a week for 6-8 weeks      Time In/Out:   Time In: 1545  Time Out: 1630    PT Visit Info:    Plan Frequency: 2 x a week for 6-8 weeks      Visit Count:  3    OUTPATIENT PHYSICAL THERAPY:   Treatment Note 2024       Charge Capture   Episode  (Bilateral knee pain)               Treatment Diagnosis:    Acute pain of both knees  Medical/Referring Diagnosis:    Acute pain of both knees [M25.561, M25.562]    Referring Physician:  Turner Eden Jr., MD MD Orders:  PT Eval and Treat   Return MD Appt:  TBD   Date of Onset:  Onset Date: 05/10/24     Allergies:   Penicillins  Restrictions/Precautions:   None      Interventions Planned (Treatment may consist of any combination of the following):     See Assessment Note      Subjective Comments:     Patient reports still some continued improvement  Initial Pain Level::  Sore    /10  Post Session Pain Level:    sore    /10  Medications Last Reviewed:  2024  Updated Objective Findings:  None Today  Treatment   THERAPEUTIC EXERCISE: (40 minutes):    Exercises per grid below to improve mobility, strength, balance, coordination, and dynamic movement of generalized knee to improve functional bending, lifting, and carrying.  Required minimal visual, verbal, and manual cues to promote proper body alignment, promote proper body posture, and promote proper body mechanics.  Progressed resistance, range, repetitions, and complexity of movement as indicated.   Date:  2024 Date:  2024 Date:  2024   Activity/Exercise

## 2024-06-18 ENCOUNTER — HOSPITAL ENCOUNTER (OUTPATIENT)
Dept: PHYSICAL THERAPY | Age: 59
Setting detail: RECURRING SERIES
End: 2024-06-18
Attending: FAMILY MEDICINE
Payer: COMMERCIAL

## 2024-06-18 NOTE — PROGRESS NOTES
Sherman Jones  : 1965  Primary: [employer]  Secondary:  [facility] [place] [address] [address] [facility] [address]  [phone]   [phone]       PHYSICAL THERAPY    Patient left without being seen today, he wants to wait until he sees the orthopaedic before continuing PT.    Mushtaq Graves, PT

## 2024-06-24 ENCOUNTER — OFFICE VISIT (OUTPATIENT)
Dept: ORTHOPEDIC SURGERY | Age: 59
End: 2024-06-24
Payer: COMMERCIAL

## 2024-06-24 DIAGNOSIS — M17.0 PRIMARY OSTEOARTHRITIS OF BOTH KNEES: ICD-10-CM

## 2024-06-24 DIAGNOSIS — M25.562 PAIN IN BOTH KNEES, UNSPECIFIED CHRONICITY: Primary | ICD-10-CM

## 2024-06-24 DIAGNOSIS — M25.561 PAIN IN BOTH KNEES, UNSPECIFIED CHRONICITY: Primary | ICD-10-CM

## 2024-06-24 PROCEDURE — 20610 DRAIN/INJ JOINT/BURSA W/O US: CPT | Performed by: NURSE PRACTITIONER

## 2024-06-24 PROCEDURE — 99204 OFFICE O/P NEW MOD 45 MIN: CPT | Performed by: NURSE PRACTITIONER

## 2024-06-24 RX ORDER — METHYLPREDNISOLONE ACETATE 40 MG/ML
40 INJECTION, SUSPENSION INTRA-ARTICULAR; INTRALESIONAL; INTRAMUSCULAR; SOFT TISSUE ONCE
Status: COMPLETED | OUTPATIENT
Start: 2024-06-24 | End: 2024-06-24

## 2024-06-24 RX ADMIN — METHYLPREDNISOLONE ACETATE 40 MG: 40 INJECTION, SUSPENSION INTRA-ARTICULAR; INTRALESIONAL; INTRAMUSCULAR; SOFT TISSUE at 15:46

## 2024-06-24 NOTE — PROGRESS NOTES
Patient ID:  Sherman Jones  [id number]  59 y.o.  1965    Today: June 24, 2024          Chief Complaint:  Bilateral Knee pain    HPI:       Sherman Jones is a 59 y.o. male  that presents today for evaluation and treatment of bilateral knee pain. Patient reports onset of pain was about 5-6 weeks ago.  Pain and swelling started after he installed an irrigation system in his yard.  The patient complains of knee pain with activities, reports stiffness of the knee with prolonged inactivity, and swelling/pain at the end of the day and after increased physical activity.  Pain is described as a general ache with occasional sharp pain, primarily along the joint lines. The patient reports pain ranging from 3-8 on the pain scale which seems to fluctuate depending on the day/activity.  Generally, symptoms improve with sitting/rest.  The pain affects the patient’s activities of daily living and quality of life.       Patient has attempted prior conservative treatment including Over-the-Counter medications including NSAID and/or Tylenol and Physical Therapy    Past Medical History:  Past Medical History:   Diagnosis Date    Alcoholism /alcohol abuse     Anxiety     Cardiomyopathy (HCC) 6/30/2016    Diverticular disease     colon resection with no symptoms since surgery    Essential hypertension     managed with med.      GERD (gastroesophageal reflux disease)     takes medication but not altogether relieved    GI bleed 2/15/2020    Hiatal hernia with gastroesophageal reflux 2/5/2013    Hypertension     managed with med.        Past Surgical History:  Past Surgical History:   Procedure Laterality Date    COLONOSCOPY  2020     2 in 2020    COLONOSCOPY  3/2018, 2008    10 years    COLONOSCOPY N/A 9/24/2020    COLONOSCOPY /29/ Room 630 performed by Jimmy Mahmood MD at [facility] ENDOSCOPY    COLONOSCOPY N/A 2/24/2020    COLONOSCOPY ROOM 517 performed by Robinson Lynn MD at [facility] ENDOSCOPY    CYST REMOVAL  2017    renal cyst

## 2024-06-25 ENCOUNTER — HOSPITAL ENCOUNTER (OUTPATIENT)
Dept: PHYSICAL THERAPY | Age: 59
Setting detail: RECURRING SERIES
End: 2024-06-25
Attending: FAMILY MEDICINE
Payer: COMMERCIAL

## 2024-06-27 ENCOUNTER — APPOINTMENT (OUTPATIENT)
Dept: PHYSICAL THERAPY | Age: 59
End: 2024-06-27
Attending: FAMILY MEDICINE
Payer: COMMERCIAL

## 2024-07-02 ENCOUNTER — HOSPITAL ENCOUNTER (OUTPATIENT)
Dept: PHYSICAL THERAPY | Age: 59
Setting detail: RECURRING SERIES
Discharge: HOME | End: 2024-07-05
Attending: FAMILY MEDICINE
Payer: COMMERCIAL

## 2024-07-02 PROCEDURE — 97110 THERAPEUTIC EXERCISES: CPT

## 2024-07-02 NOTE — PROGRESS NOTES
Sherman Jones  : 1965  Primary: [employer] ([employer])  Secondary:  [facility]note 3[facility] @ [place] [address] [address] [place] [address]  Phone: [phone]  Fax: [phone] Plan Frequency: 2 x a week for 6-8 weeks  Plan of Care/Certification Expiration Date: 24        Plan of Care/Certification Expiration Date:  Plan of Care/Certification Expiration Date: 24    Frequency/Duration: Plan Frequency: 2 x a week for 6-8 weeks      Time In/Out:   Time In: 1545  Time Out: 1625    PT Visit Info:    Plan Frequency: 2 x a week for 6-8 weeks      Visit Count:  4    OUTPATIENT PHYSICAL THERAPY:   Treatment Note 2024       Charge Capture   Episode  (Bilateral knee pain)               Treatment Diagnosis:    Acute pain of both knees  Medical/Referring Diagnosis:    Acute pain of both knees [M25.561, M25.562]    Referring Physician:  Turner Eden Jr., MD MD Orders:  PT Eval and Treat   Return MD Appt:  TBD   Date of Onset:  Onset Date: 05/10/24     Allergies:   Penicillins  Restrictions/Precautions:   None      Interventions Planned (Treatment may consist of any combination of the following):     See Assessment Note      Subjective Comments:     Patient reports some improvement after injections for knees  Initial Pain Level::  Sore    /10  Post Session Pain Level:    sore    /10  Medications Last Reviewed:  2024  Updated Objective Findings:  None Today  Treatment   THERAPEUTIC EXERCISE: (38 minutes):    Exercises per grid below to improve mobility, strength, balance, coordination, and dynamic movement of generalized knee to improve functional bending, lifting, and carrying.  Required minimal visual, verbal, and manual cues to promote proper body alignment, promote proper body posture, and promote proper body mechanics.  Progressed resistance, range, repetitions, and complexity of movement as indicated.   Date:  2024 Date:  2024 Date:  2024

## 2024-07-09 ENCOUNTER — APPOINTMENT (OUTPATIENT)
Dept: PHYSICAL THERAPY | Age: 59
End: 2024-07-09
Attending: FAMILY MEDICINE
Payer: COMMERCIAL

## 2024-07-11 ENCOUNTER — APPOINTMENT (OUTPATIENT)
Dept: PHYSICAL THERAPY | Age: 59
End: 2024-07-11
Attending: FAMILY MEDICINE
Payer: COMMERCIAL

## 2024-08-06 ENCOUNTER — TELEPHONE (OUTPATIENT)
Dept: ORTHOPEDIC SURGERY | Age: 59
End: 2024-08-06

## 2024-08-06 DIAGNOSIS — M25.562 LEFT KNEE PAIN, UNSPECIFIED CHRONICITY: ICD-10-CM

## 2024-08-06 DIAGNOSIS — M25.561 RIGHT KNEE PAIN, UNSPECIFIED CHRONICITY: ICD-10-CM

## 2024-08-06 DIAGNOSIS — M25.562 PAIN IN BOTH KNEES, UNSPECIFIED CHRONICITY: Primary | ICD-10-CM

## 2024-08-06 DIAGNOSIS — M25.561 PAIN IN BOTH KNEES, UNSPECIFIED CHRONICITY: Primary | ICD-10-CM

## 2024-08-20 DIAGNOSIS — M25.561 PAIN IN BOTH KNEES, UNSPECIFIED CHRONICITY: Primary | ICD-10-CM

## 2024-08-20 DIAGNOSIS — M25.562 PAIN IN BOTH KNEES, UNSPECIFIED CHRONICITY: Primary | ICD-10-CM

## 2024-08-22 DIAGNOSIS — M25.561 PAIN IN BOTH KNEES, UNSPECIFIED CHRONICITY: Primary | ICD-10-CM

## 2024-08-22 DIAGNOSIS — M25.562 PAIN IN BOTH KNEES, UNSPECIFIED CHRONICITY: Primary | ICD-10-CM

## 2024-09-10 ENCOUNTER — OFFICE VISIT (OUTPATIENT)
Dept: ORTHOPEDIC SURGERY | Age: 59
End: 2024-09-10
Payer: COMMERCIAL

## 2024-09-10 DIAGNOSIS — M17.0 PRIMARY OSTEOARTHRITIS OF BOTH KNEES: Primary | ICD-10-CM

## 2024-09-10 PROCEDURE — 99214 OFFICE O/P EST MOD 30 MIN: CPT | Performed by: ORTHOPAEDIC SURGERY

## 2024-09-10 PROCEDURE — 20610 DRAIN/INJ JOINT/BURSA W/O US: CPT | Performed by: ORTHOPAEDIC SURGERY

## 2024-09-10 RX ORDER — HYALURONATE SODIUM 10 MG/ML
20 SYRINGE (ML) INTRAARTICULAR ONCE
Status: COMPLETED | OUTPATIENT
Start: 2024-09-10 | End: 2024-09-10

## 2024-09-10 RX ADMIN — Medication 20 MG: at 14:18

## 2024-09-17 ENCOUNTER — OFFICE VISIT (OUTPATIENT)
Dept: ORTHOPEDIC SURGERY | Age: 59
End: 2024-09-17
Payer: COMMERCIAL

## 2024-09-17 DIAGNOSIS — M17.0 PRIMARY OSTEOARTHRITIS OF BOTH KNEES: Primary | ICD-10-CM

## 2024-09-17 PROCEDURE — 20610 DRAIN/INJ JOINT/BURSA W/O US: CPT | Performed by: ORTHOPAEDIC SURGERY

## 2024-09-17 RX ORDER — HYALURONATE SODIUM 10 MG/ML
20 SYRINGE (ML) INTRAARTICULAR ONCE
Status: COMPLETED | OUTPATIENT
Start: 2024-09-17 | End: 2024-09-17

## 2024-09-17 RX ADMIN — Medication 20 MG: at 13:50

## 2024-09-24 ENCOUNTER — OFFICE VISIT (OUTPATIENT)
Dept: ORTHOPEDIC SURGERY | Age: 59
End: 2024-09-24
Payer: COMMERCIAL

## 2024-09-24 DIAGNOSIS — M17.0 PRIMARY OSTEOARTHRITIS OF BOTH KNEES: Primary | ICD-10-CM

## 2024-09-24 PROCEDURE — 20610 DRAIN/INJ JOINT/BURSA W/O US: CPT | Performed by: ORTHOPAEDIC SURGERY

## 2024-09-24 RX ORDER — HYALURONATE SODIUM 10 MG/ML
20 SYRINGE (ML) INTRAARTICULAR ONCE
Status: COMPLETED | OUTPATIENT
Start: 2024-09-24 | End: 2024-09-24

## 2024-09-24 RX ADMIN — Medication 20 MG: at 14:49

## 2025-01-29 ENCOUNTER — PATIENT MESSAGE (OUTPATIENT)
Dept: ORTHOPEDIC SURGERY | Age: 60
End: 2025-01-29

## 2025-02-04 ENCOUNTER — TELEPHONE (OUTPATIENT)
Dept: ORTHOPEDIC SURGERY | Age: 60
End: 2025-02-04

## 2025-02-04 NOTE — TELEPHONE ENCOUNTER
Patient requested an appointment for his neck pain through [facility].  Patient last saw AMY on 12/10/20 and was referred to pain management.  Patient states that he hasn't seen pain management for the last 2 years or so and has just been dealing with the pain.  He has not had a current MRI.  Please advise if patient can be scheduled, thank you.

## 2025-02-07 ENCOUNTER — OFFICE VISIT (OUTPATIENT)
Age: 60
End: 2025-02-07

## 2025-02-07 VITALS — BODY MASS INDEX: 27.39 KG/M2 | HEIGHT: 70 IN | WEIGHT: 191.3 LBS

## 2025-02-07 DIAGNOSIS — M50.30 DEGENERATIVE DISC DISEASE, CERVICAL: ICD-10-CM

## 2025-02-07 DIAGNOSIS — M54.2 NECK PAIN: Primary | ICD-10-CM

## 2025-02-07 DIAGNOSIS — M40.202 KYPHOSIS OF CERVICAL REGION, UNSPECIFIED KYPHOSIS TYPE: ICD-10-CM

## 2025-02-07 DIAGNOSIS — M54.12 CERVICAL RADICULOPATHY: ICD-10-CM

## 2025-02-07 RX ORDER — MULTIVIT-MIN/FA/LYCOPEN/LUTEIN .4-300-25
TABLET ORAL
COMMUNITY
Start: 2020-01-01

## 2025-02-07 RX ORDER — METHYLPREDNISOLONE 4 MG/1
TABLET ORAL
Qty: 1 KIT | Refills: 0 | Status: SHIPPED | OUTPATIENT
Start: 2025-02-07

## 2025-02-07 RX ORDER — GABAPENTIN 100 MG/1
100-300 CAPSULE ORAL NIGHTLY
Qty: 90 CAPSULE | Refills: 0 | Status: SHIPPED | OUTPATIENT
Start: 2025-02-07 | End: 2025-03-09

## 2025-02-07 RX ORDER — METHOCARBAMOL 750 MG/1
750 TABLET, FILM COATED ORAL 3 TIMES DAILY PRN
Qty: 30 TABLET | Refills: 0 | Status: SHIPPED | OUTPATIENT
Start: 2025-02-07 | End: 2025-02-17

## 2025-02-07 NOTE — PROGRESS NOTES
onset of radiculopathy.  Will go and start conservative treatment.  I will give him a Medrol Dosepak and gabapentin along with Robaxin.  He is given a home exercise program.  I will see him back in 5 to 6 weeks if he still continues to have pain we will look at pursuing an MRI of the cervical spine    I discussed the natural history of this condition with the patient in that often these patients will experience diminishing of their symptoms within several weeks of conservative care.  We also discussed that the typical conservative treatments available include rest, NSAIDs, pain medication, and physical therapy as symptoms allow.  Oral and/or epidural steroids are other options to consider.   I discussed potential surgical options including a discectomy and fusion if the symptoms fail to improve or there is a progressive neurologic deficit and conservative management has been exhausted.      -Home PT: Patient was prescribed a course of home exercises.   -MRI if fails conservative treatment.  If the patient fails to respond to these efforts, I would recommend MRI of the cervical spine for further evaluation.  -Steroids: A short oral steroid taper was prescribed to try to bring the more acute symptoms under control. The patient understands that there are risks associated with steroids including elevated blood sugar, hypertension, increased risk of infections, and thinning of the bones if taken repeatedly or for prolonged periods. The taper can be followed by NSAIDs once completed  - Muscle relaxant.  The patient was prescribed a muscle relaxant to reduce neck pain. Side effects were discussed including drowsiness, fatigue, and depression.   - Gabapentin.  The patient was prescribed an initial regimen of gabapentin.  The medication nay need to be titrated up to a therapeutic level.  Side effects were discussed including dizziness, ataxia, drowsiness, and nystagmus, or blurred vision.         Orders Placed This Encounter

## 2025-02-14 ENCOUNTER — OFFICE VISIT (OUTPATIENT)
Dept: ORTHOPEDIC SURGERY | Age: 60
End: 2025-02-14
Payer: COMMERCIAL

## 2025-02-14 DIAGNOSIS — M17.0 PRIMARY OSTEOARTHRITIS OF BOTH KNEES: Primary | ICD-10-CM

## 2025-02-14 PROCEDURE — 99214 OFFICE O/P EST MOD 30 MIN: CPT | Performed by: NURSE PRACTITIONER

## 2025-02-14 PROCEDURE — M5011 MISC GENUTRAIN KNEE BRACE: HCPCS | Performed by: NURSE PRACTITIONER

## 2025-02-14 NOTE — PROGRESS NOTES
The patient was prescribed and fitted with a Genutrain knee brace for the left knee, size 3. He was instructed how to wear the brace, and all questions were answered during the fitting.     Patient read and signed documenting they understand and agree to [facility]'s current DME return policy.

## 2025-02-14 NOTE — PROGRESS NOTES
Patient ID:  Sherman Jones  [id number]  59 y.o.  1965    Today: February 14, 2025          Chief Complaint: Bilateral Knee pain    HPI:       Sherman Jones is a 59 y.o. male  that presents today for followup of bilateral knee pain. He had visco injections in the knees last September which helped with the pain.  Today, he is not having any pain.  His primary concern is the left knee feels like it catches when he goes to stand up.  After he takes a few steps the symptoms go away.  He tried PT but did not feel like that helped.        Past Medical History:  Past Medical History:   Diagnosis Date    Alcoholism /alcohol abuse     Anxiety     Cardiomyopathy (HCC) 6/30/2016    Diverticular disease     colon resection with no symptoms since surgery    Essential hypertension     managed with med.      GERD (gastroesophageal reflux disease)     takes medication but not altogether relieved    GI bleed 2/15/2020    Hiatal hernia with gastroesophageal reflux 2/5/2013    Hypertension     managed with med.        Past Surgical History:  Past Surgical History:   Procedure Laterality Date    COLONOSCOPY  2020     2 in 2020    COLONOSCOPY  3/2018, 2008    10 years    COLONOSCOPY N/A 9/24/2020    COLONOSCOPY /29/ Room 630 performed by Jimmy Mahmood MD at [facility] ENDOSCOPY    COLONOSCOPY N/A 2/24/2020    COLONOSCOPY ROOM 517 performed by Robinson Lynn MD at [facility] ENDOSCOPY    CYST REMOVAL  2017    renal cyst    HERNIA REPAIR      Surendra fundiplication    TOTAL COLECTOMY  4/2008        Medications:     Prior to Admission medications    Medication Sig Start Date End Date Taking? Authorizing Provider   Multiple Vitamins-Minerals (CENTRUM SILVER ADULT 50+) TABS  1/1/20   Provider, MD Princess   methylPREDNISolone (MEDROL DOSEPACK) 4 MG tablet Take by mouth as directed. Start in morning 2/7/25   Harman Tompkins, APRN - CNP   gabapentin (NEURONTIN) 100 MG capsule Take 1-3 capsules by mouth nightly for 30 days. 2/7/25

## 2025-02-17 ENCOUNTER — TELEPHONE (OUTPATIENT)
Dept: ORTHOPEDIC SURGERY | Age: 60
End: 2025-02-17

## 2025-02-17 DIAGNOSIS — M17.0 PRIMARY OSTEOARTHRITIS OF BOTH KNEES: Primary | ICD-10-CM

## 2025-02-19 NOTE — ED PROVIDER NOTES
Dear Dr. Delcid     This is a reminder that the Narrative Report regarding patient Nikki Tian is overdue. The requester has paid for this report and needs this information as soon as possible as the patient has a hearing coming up soon.      The paperwork can be found under the Media tab in the patient's chart.      Encounter date - 09/12/2024     Once the report has been completed, printed on letter head and signed by you, please fax to Ravi Payan [phone] and forward the original via interdepartmental mail to Ravi Payan - Medical Information Department at [facility].      If you do not feel comfortable or will not submit the Narrative report, please let me know and I can send the payment back to the requester. Please be aware that if the report is not received, the requester may request your appearance at a deposition or trial.      Thank you  Ravi   [facility] Provider Note                   PCP:                Jagjit Urban MD               Age: 62 y.o. Sex: male     No diagnosis found. DISPOSITION         New Prescriptions    No medications on file       Orders Placed This Encounter   Procedures    CT ABDOMEN PELVIS W IV CONTRAST Additional Contrast? Oral    CBC with Diff    CMP    Lipase    Urinalysis w rflx microscopic    Diet NPO    Saline lock IV        MDM  Number of Diagnoses or Management Options  Diagnosis management comments: Rectal pain, fever, history of diverticulitis  Labs reviewed  Tylenol 1 gram po  Zofran 4 mg IV  Morphine 4 mg IV  NS 1 liter IVF's  CT abdomen and pelvis with contrast ordered  Signed to Dr. Mariana Chapman for continued care       Amount and/or Complexity of Data Reviewed  Clinical lab tests: ordered  Tests in the radiology section of CPT®: ordered  Decide to obtain previous medical records or to obtain history from someone other than the patient: yes  Obtain history from someone other than the patient: yes (wife)  Review and summarize past medical records: yes  Independent visualization of images, tracings, or specimens: yes         Sarah Vizcaino is a 62 y.o. male who presents to the Emergency Department with chief complaint of    Chief Complaint   Patient presents with    Abdominal Pain      Patient complains of rectal pressure and pain for the past 4 to 5 days. Last BM yesterday. Today nausea, decreased appetite, chills, T max 100.6. Aching from the waist down. No vomiting. History of diverticulitis with anterior colon resection 14 years ago. Has had GI bleed in past from diverticulosis. No bleeding recently. The history is provided by the patient and the spouse. All other systems reviewed and are negative. Review of Systems   Constitutional: Positive for appetite change, chills and fever. HENT: Negative. Respiratory: Negative. Cardiovascular: Negative. Gastrointestinal: Positive for nausea and rectal pain. Negative for abdominal pain, blood in stool, diarrhea and vomiting. Genitourinary: Negative for difficulty urinating, penile swelling, scrotal swelling and testicular pain. Musculoskeletal: Positive for myalgias. Skin: Negative. Neurological: Negative. Hematological: Negative. Psychiatric/Behavioral: Negative. Past Medical History:   Diagnosis Date    Alcoholism /alcohol abuse     Anxiety     Cardiomyopathy ([address] 6/30/2016    Diverticular disease     colon resection with no symptoms since surgery    Essential hypertension     managed with med.  GERD (gastroesophageal reflux disease)     takes medication but not altogether relieved    GI bleed 2/15/2020    Hiatal hernia with gastroesophageal reflux 2/5/2013    Hypertension     managed with med. Past Surgical History:   Procedure Laterality Date    COLONOSCOPY  2020     2 in 2020    COLONOSCOPY  3/2018, 2008    10 years    COLONOSCOPY N/A 9/24/2020    COLONOSCOPY /29/ Room 630 performed by Ghanshyam Bright MD at [place] [address] COLONOSCOPY N/A 2/24/2020    COLONOSCOPY ROOM 517 performed by William [facility] at [facility] ENDOSCOPY    CYST REMOVAL  2017    renal cyst    HERNIA REPAIR      Surendra fundiplication    TOTAL COLECTOMY  4/2008        Family History   Problem Relation Age of Onset    No Known Problems Brother     Cancer Father         brain    Breast Cancer Mother            Social Connections:     Frequency of Communication with Friends and Family: Not on file    Frequency of Social Gatherings with Friends and Family: Not on file    Attends Scientology Services: Not on file    Active Member of Clubs or Organizations: Not on file    Attends Club or Organization Meetings: Not on file    Marital Status: Not on file        Allergies   Allergen Reactions    Penicillins Hives        Vitals signs and nursing note reviewed.    Patient Vitals for the past 4 hrs:   Temp Pulse Resp BP SpO2   06/03/22 1738 100.1 °F (37.8 °C) (!) 102 18 139/88 96 %          Physical Exam  Vitals and nursing note reviewed. Constitutional:       Appearance: He is well-developed. HENT:      Head: Normocephalic. Mouth/Throat:      Mouth: Mucous membranes are moist.   Cardiovascular:      Rate and Rhythm: Regular rhythm. Tachycardia present. Heart sounds: Normal heart sounds. Pulmonary:      Effort: Pulmonary effort is normal.      Breath sounds: Normal breath sounds. Abdominal:      General: Abdomen is flat. Bowel sounds are normal.      Palpations: Abdomen is soft. Tenderness: There is no abdominal tenderness. Genitourinary:     Comments: Rectal tenderness. No mass felt. No stool in rectum. Prostate tenderness. Scrotum, testicles, penis normal. No perineal swelling. Musculoskeletal:      Right lower leg: No edema. Left lower leg: No edema. Skin:     General: Skin is warm and dry. Findings: No rash. Neurological:      General: No focal deficit present. Mental Status: He is alert and oriented to person, place, and time. Psychiatric:         Mood and Affect: Mood normal.          Procedures    Labs Reviewed   CBC WITH AUTO DIFFERENTIAL - Abnormal; Notable for the following components:       Result Value    MPV 8.9 (*)     Seg Neutrophils 80 (*)     Eosinophils % 0 (*)     All other components within normal limits   COMPREHENSIVE METABOLIC PANEL - Abnormal; Notable for the following components:    Glucose 105 (*)     CREATININE 0.69 (*)     All other components within normal limits   LIPASE   URINALYSIS        CT ABDOMEN PELVIS W IV CONTRAST Additional Contrast? Oral    (Results Pending)            Beason Coma Scale  Eye Opening: Spontaneous  Best Verbal Response: Oriented  Best Motor Response: Obeys commands  Beason Coma Scale Score: 15                     Voice dictation software was used during the making of this note.   This software is not perfect and grammatical and other typographical errors may be present. This note has not been completely proofread for errors.      Jose L Lou MD  06/03/22 David Daniels

## 2025-02-26 ENCOUNTER — TELEPHONE (OUTPATIENT)
Dept: ORTHOPEDIC SURGERY | Age: 60
End: 2025-02-26

## 2025-02-26 NOTE — TELEPHONE ENCOUNTER
The patient was contacted and informed that it hasn't been 6 months since his last gel injection. He can get one on or after 3/25 as long as we receive authorization prior.

## 2025-02-26 NOTE — TELEPHONE ENCOUNTER
Followup  to  previous  gel injection  request  for the  left  knee    I  dont  see that they  have  been  approved.  I  do  believe  you have  put the  order in

## 2025-03-10 DIAGNOSIS — Z00.00 ROUTINE GENERAL MEDICAL EXAMINATION AT A HEALTH CARE FACILITY: Primary | ICD-10-CM

## 2025-03-11 ENCOUNTER — LAB (OUTPATIENT)
Dept: FAMILY MEDICINE CLINIC | Facility: CLINIC | Age: 60
End: 2025-03-11

## 2025-03-11 DIAGNOSIS — Z00.00 ROUTINE GENERAL MEDICAL EXAMINATION AT A HEALTH CARE FACILITY: ICD-10-CM

## 2025-03-11 LAB
ALBUMIN SERPL-MCNC: 4.1 G/DL (ref 3.5–5)
ALBUMIN/GLOB SERPL: 1.4 (ref 1–1.9)
ALP SERPL-CCNC: 46 U/L (ref 40–129)
ALT SERPL-CCNC: 22 U/L (ref 8–55)
ANION GAP SERPL CALC-SCNC: 10 MMOL/L (ref 7–16)
APPEARANCE UR: CLEAR
AST SERPL-CCNC: 19 U/L (ref 15–37)
BACTERIA URNS QL MICRO: 0 /HPF
BASOPHILS # BLD: 0.03 K/UL (ref 0–0.2)
BASOPHILS NFR BLD: 0.7 % (ref 0–2)
BILIRUB SERPL-MCNC: 0.3 MG/DL (ref 0–1.2)
BILIRUB UR QL: NEGATIVE
BUN SERPL-MCNC: 11 MG/DL (ref 6–23)
CALCIUM SERPL-MCNC: 9.6 MG/DL (ref 8.8–10.2)
CHLORIDE SERPL-SCNC: 106 MMOL/L (ref 98–107)
CHOLEST SERPL-MCNC: 217 MG/DL (ref 0–200)
CO2 SERPL-SCNC: 27 MMOL/L (ref 20–29)
COLOR UR: NORMAL
CREAT SERPL-MCNC: 0.81 MG/DL (ref 0.8–1.3)
DIFFERENTIAL METHOD BLD: NORMAL
EOSINOPHIL # BLD: 0.06 K/UL (ref 0–0.8)
EOSINOPHIL NFR BLD: 1.3 % (ref 0.5–7.8)
ERYTHROCYTE [DISTWIDTH] IN BLOOD BY AUTOMATED COUNT: 13.3 % (ref 11.9–14.6)
GLOBULIN SER CALC-MCNC: 3 G/DL (ref 2.3–3.5)
GLUCOSE SERPL-MCNC: 97 MG/DL (ref 70–99)
GLUCOSE UR STRIP.AUTO-MCNC: NEGATIVE MG/DL
HCT VFR BLD AUTO: 46.9 % (ref 41.1–50.3)
HDLC SERPL-MCNC: 57 MG/DL (ref 40–60)
HDLC SERPL: 3.8 (ref 0–5)
HGB BLD-MCNC: 15.6 G/DL (ref 13.6–17.2)
HGB UR QL STRIP: NEGATIVE
IMM GRANULOCYTES # BLD AUTO: 0.01 K/UL (ref 0–0.5)
IMM GRANULOCYTES NFR BLD AUTO: 0.2 % (ref 0–5)
KETONES UR QL STRIP.AUTO: NEGATIVE MG/DL
LDLC SERPL CALC-MCNC: 145 MG/DL (ref 0–100)
LEUKOCYTE ESTERASE UR QL STRIP.AUTO: NEGATIVE
LYMPHOCYTES # BLD: 1.75 K/UL (ref 0.5–4.6)
LYMPHOCYTES NFR BLD: 38.4 % (ref 13–44)
MCH RBC QN AUTO: 31.8 PG (ref 26.1–32.9)
MCHC RBC AUTO-ENTMCNC: 33.3 G/DL (ref 31.4–35)
MCV RBC AUTO: 95.7 FL (ref 82–102)
MONOCYTES # BLD: 0.35 K/UL (ref 0.1–1.3)
MONOCYTES NFR BLD: 7.7 % (ref 4–12)
NEUTS SEG # BLD: 2.36 K/UL (ref 1.7–8.2)
NEUTS SEG NFR BLD: 51.7 % (ref 43–78)
NITRITE UR QL STRIP.AUTO: NEGATIVE
NRBC # BLD: 0 K/UL (ref 0–0.2)
PH UR STRIP: 5.5 (ref 5–9)
PLATELET # BLD AUTO: 243 K/UL (ref 150–450)
PMV BLD AUTO: 9.6 FL (ref 9.4–12.3)
POTASSIUM SERPL-SCNC: 4.5 MMOL/L (ref 3.5–5.1)
PROT SERPL-MCNC: 7 G/DL (ref 6.3–8.2)
PROT UR STRIP-MCNC: NEGATIVE MG/DL
PSA SERPL-MCNC: 0.8 NG/ML (ref 0–4)
RBC # BLD AUTO: 4.9 M/UL (ref 4.23–5.6)
SODIUM SERPL-SCNC: 143 MMOL/L (ref 136–145)
SP GR UR REFRACTOMETRY: 1.02 (ref 1–1.02)
TRIGL SERPL-MCNC: 76 MG/DL (ref 0–150)
TSH, 3RD GENERATION: 1.71 UIU/ML (ref 0.27–4.2)
UROBILINOGEN UR QL STRIP.AUTO: 0.2 EU/DL (ref 0.2–1)
VLDLC SERPL CALC-MCNC: 15 MG/DL (ref 6–23)
WBC # BLD AUTO: 4.6 K/UL (ref 4.3–11.1)

## 2025-03-15 ASSESSMENT — PATIENT HEALTH QUESTIONNAIRE - PHQ9
5. POOR APPETITE OR OVEREATING: NOT AT ALL
3. TROUBLE FALLING OR STAYING ASLEEP: NOT AT ALL
2. FEELING DOWN, DEPRESSED OR HOPELESS: NOT AT ALL
8. MOVING OR SPEAKING SO SLOWLY THAT OTHER PEOPLE COULD HAVE NOTICED. OR THE OPPOSITE, BEING SO FIGETY OR RESTLESS THAT YOU HAVE BEEN MOVING AROUND A LOT MORE THAN USUAL: NOT AT ALL
SUM OF ALL RESPONSES TO PHQ QUESTIONS 1-9: 1
4. FEELING TIRED OR HAVING LITTLE ENERGY: SEVERAL DAYS
10. IF YOU CHECKED OFF ANY PROBLEMS, HOW DIFFICULT HAVE THESE PROBLEMS MADE IT FOR YOU TO DO YOUR WORK, TAKE CARE OF THINGS AT HOME, OR GET ALONG WITH OTHER PEOPLE: NOT DIFFICULT AT ALL
7. TROUBLE CONCENTRATING ON THINGS, SUCH AS READING THE NEWSPAPER OR WATCHING TELEVISION: NOT AT ALL
6. FEELING BAD ABOUT YOURSELF - OR THAT YOU ARE A FAILURE OR HAVE LET YOURSELF OR YOUR FAMILY DOWN: NOT AT ALL
SUM OF ALL RESPONSES TO PHQ QUESTIONS 1-9: 1
5. POOR APPETITE OR OVEREATING: NOT AT ALL
8. MOVING OR SPEAKING SO SLOWLY THAT OTHER PEOPLE COULD HAVE NOTICED. OR THE OPPOSITE - BEING SO FIDGETY OR RESTLESS THAT YOU HAVE BEEN MOVING AROUND A LOT MORE THAN USUAL: NOT AT ALL
9. THOUGHTS THAT YOU WOULD BE BETTER OFF DEAD, OR OF HURTING YOURSELF: NOT AT ALL
1. LITTLE INTEREST OR PLEASURE IN DOING THINGS: NOT AT ALL
9. THOUGHTS THAT YOU WOULD BE BETTER OFF DEAD, OR OF HURTING YOURSELF: NOT AT ALL
1. LITTLE INTEREST OR PLEASURE IN DOING THINGS: NOT AT ALL
7. TROUBLE CONCENTRATING ON THINGS, SUCH AS READING THE NEWSPAPER OR WATCHING TELEVISION: NOT AT ALL
6. FEELING BAD ABOUT YOURSELF - OR THAT YOU ARE A FAILURE OR HAVE LET YOURSELF OR YOUR FAMILY DOWN: NOT AT ALL
2. FEELING DOWN, DEPRESSED OR HOPELESS: NOT AT ALL
10. IF YOU CHECKED OFF ANY PROBLEMS, HOW DIFFICULT HAVE THESE PROBLEMS MADE IT FOR YOU TO DO YOUR WORK, TAKE CARE OF THINGS AT HOME, OR GET ALONG WITH OTHER PEOPLE: NOT DIFFICULT AT ALL
4. FEELING TIRED OR HAVING LITTLE ENERGY: SEVERAL DAYS
3. TROUBLE FALLING OR STAYING ASLEEP: NOT AT ALL
SUM OF ALL RESPONSES TO PHQ QUESTIONS 1-9: 1

## 2025-03-17 ENCOUNTER — OFFICE VISIT (OUTPATIENT)
Age: 60
End: 2025-03-17
Payer: COMMERCIAL

## 2025-03-17 DIAGNOSIS — M54.12 CERVICAL RADICULOPATHY: ICD-10-CM

## 2025-03-17 DIAGNOSIS — M50.30 DEGENERATIVE DISC DISEASE, CERVICAL: ICD-10-CM

## 2025-03-17 DIAGNOSIS — M40.202 KYPHOSIS OF CERVICAL REGION, UNSPECIFIED KYPHOSIS TYPE: Primary | ICD-10-CM

## 2025-03-17 PROCEDURE — 99214 OFFICE O/P EST MOD 30 MIN: CPT | Performed by: NURSE PRACTITIONER

## 2025-03-17 RX ORDER — GABAPENTIN 100 MG/1
100-300 CAPSULE ORAL NIGHTLY
Qty: 90 CAPSULE | Refills: 5 | Status: SHIPPED | OUTPATIENT
Start: 2025-03-17 | End: 2025-09-13

## 2025-03-17 NOTE — PROGRESS NOTES
undiagnosed new problem with uncertain prognosis    Return if symptoms worsen or fail to improve.       LUIS M Mercado - CNP  03/17/25      Elements of this note were created using speech recognition software.  As such, errors of speech recognition may be present.

## 2025-03-18 ENCOUNTER — OFFICE VISIT (OUTPATIENT)
Dept: FAMILY MEDICINE CLINIC | Facility: CLINIC | Age: 60
End: 2025-03-18
Payer: COMMERCIAL

## 2025-03-18 VITALS
WEIGHT: 191 LBS | DIASTOLIC BLOOD PRESSURE: 84 MMHG | TEMPERATURE: 97.2 F | HEART RATE: 72 BPM | SYSTOLIC BLOOD PRESSURE: 130 MMHG | OXYGEN SATURATION: 97 % | BODY MASS INDEX: 27.8 KG/M2

## 2025-03-18 DIAGNOSIS — E78.5 HYPERLIPIDEMIA, UNSPECIFIED HYPERLIPIDEMIA TYPE: Primary | ICD-10-CM

## 2025-03-18 DIAGNOSIS — Z00.00 ROUTINE HEALTH MAINTENANCE: ICD-10-CM

## 2025-03-18 DIAGNOSIS — B35.1 ONYCHOMYCOSIS: ICD-10-CM

## 2025-03-18 PROCEDURE — 3075F SYST BP GE 130 - 139MM HG: CPT | Performed by: FAMILY MEDICINE

## 2025-03-18 PROCEDURE — 99396 PREV VISIT EST AGE 40-64: CPT | Performed by: FAMILY MEDICINE

## 2025-03-18 PROCEDURE — 3079F DIAST BP 80-89 MM HG: CPT | Performed by: FAMILY MEDICINE

## 2025-03-18 RX ORDER — TERBINAFINE HYDROCHLORIDE 250 MG/1
250 TABLET ORAL DAILY
Qty: 84 TABLET | Refills: 0 | Status: SHIPPED | OUTPATIENT
Start: 2025-03-18 | End: 2025-06-10

## 2025-03-18 SDOH — ECONOMIC STABILITY: FOOD INSECURITY: WITHIN THE PAST 12 MONTHS, YOU WORRIED THAT YOUR FOOD WOULD RUN OUT BEFORE YOU GOT MONEY TO BUY MORE.: NEVER TRUE

## 2025-03-18 SDOH — ECONOMIC STABILITY: FOOD INSECURITY: WITHIN THE PAST 12 MONTHS, THE FOOD YOU BOUGHT JUST DIDN'T LAST AND YOU DIDN'T HAVE MONEY TO GET MORE.: NEVER TRUE

## 2025-03-18 ASSESSMENT — ENCOUNTER SYMPTOMS
RESPIRATORY NEGATIVE: 1
EYES NEGATIVE: 1
ALLERGIC/IMMUNOLOGIC NEGATIVE: 1
GASTROINTESTINAL NEGATIVE: 1

## 2025-03-18 NOTE — PROGRESS NOTES
HISTORY OF PRESENT ILLNESS    Sherman Jones is a 59 y.o. male.  HPI  Chief Complaint   Patient presents with    Annual Exam     See above. Overall feeling well.  Followed by ortho for chronic pain due to DJD. Stable on current regimen.  Remote history of PE, DVT and HTN. Off medications since he quit alcohol years ago.  Takes Zoloft occasionally. Denies anxiety or depression.  For over a year has had thickening and discoloration of toenails.      Current Outpatient Medications on File Prior to Visit   Medication Sig Dispense Refill    gabapentin (NEURONTIN) 100 MG capsule Take 1-3 capsules by mouth nightly for 180 days. 90 capsule 5    Multiple Vitamins-Minerals (CENTRUM SILVER ADULT 50+) TABS       ascorbic acid (VITAMIN C) 1000 MG tablet Take 1 tablet by mouth      methylPREDNISolone (MEDROL DOSEPACK) 4 MG tablet Take by mouth as directed. Start in morning (Patient not taking: Reported on 3/18/2025) 1 kit 0    gabapentin (NEURONTIN) 100 MG capsule Take 1-3 capsules by mouth nightly for 30 days. 90 capsule 0    sertraline (ZOLOFT) 50 MG tablet Take 1 tablet by mouth daily 90 tablet 1     No current facility-administered medications on file prior to visit.     Past Medical History:   Diagnosis Date    Alcoholism /alcohol abuse     Anxiety     Cardiomyopathy (HCC) 6/30/2016    Diverticular disease     colon resection with no symptoms since surgery    Essential hypertension     managed with med.      GERD (gastroesophageal reflux disease)     takes medication but not altogether relieved    GI bleed 2/15/2020    Hiatal hernia with gastroesophageal reflux 2/5/2013    Hypertension     managed with med.        Review of Systems   Constitutional: Negative.    HENT: Negative.     Eyes: Negative.    Respiratory: Negative.     Cardiovascular: Negative.    Gastrointestinal: Negative.    Genitourinary: Negative.    Musculoskeletal:  Positive for arthralgias.   Allergic/Immunologic: Negative.    Neurological: Negative.

## 2025-03-19 ENCOUNTER — TELEPHONE (OUTPATIENT)
Dept: ORTHOPEDIC SURGERY | Age: 60
End: 2025-03-19

## 2025-03-19 NOTE — TELEPHONE ENCOUNTER
Gels are approved  are  they for  both  knees?    Also they  are  DJW  only    pt  can  come  either  day  and  late  afternoon  Can  you  give me some  work in  times    for  these  injections

## 2025-03-24 ENCOUNTER — OFFICE VISIT (OUTPATIENT)
Dept: FAMILY MEDICINE CLINIC | Facility: CLINIC | Age: 60
End: 2025-03-24
Payer: COMMERCIAL

## 2025-03-24 VITALS
DIASTOLIC BLOOD PRESSURE: 84 MMHG | SYSTOLIC BLOOD PRESSURE: 128 MMHG | WEIGHT: 187 LBS | OXYGEN SATURATION: 96 % | HEART RATE: 99 BPM | BODY MASS INDEX: 26.77 KG/M2 | HEIGHT: 70 IN | TEMPERATURE: 99.5 F

## 2025-03-24 DIAGNOSIS — J18.9 ATYPICAL PNEUMONIA: Primary | ICD-10-CM

## 2025-03-24 PROCEDURE — 3074F SYST BP LT 130 MM HG: CPT | Performed by: FAMILY MEDICINE

## 2025-03-24 PROCEDURE — 99214 OFFICE O/P EST MOD 30 MIN: CPT | Performed by: FAMILY MEDICINE

## 2025-03-24 PROCEDURE — 3079F DIAST BP 80-89 MM HG: CPT | Performed by: FAMILY MEDICINE

## 2025-03-24 RX ORDER — BENZONATATE 200 MG/1
200 CAPSULE ORAL 3 TIMES DAILY PRN
Qty: 30 CAPSULE | Refills: 0 | Status: SHIPPED | OUTPATIENT
Start: 2025-03-24 | End: 2025-04-03

## 2025-03-24 RX ORDER — AZITHROMYCIN 250 MG/1
TABLET, FILM COATED ORAL
Qty: 6 TABLET | Refills: 0 | Status: SHIPPED | OUTPATIENT
Start: 2025-03-24 | End: 2025-04-03

## 2025-03-24 NOTE — PROGRESS NOTES
Sherman Jones (:  1965) is a 59 y.o. male,Established patient, here for evaluation of the following chief complaint(s):  Congestion (Since Thursday ) and Cough         Assessment & Plan  Atypical pneumonia   Acute condition, new, patient was given a prescription for azithromycin for atypical pneumonia and benzonatate for coughing symptoms    Orders:    azithromycin (ZITHROMAX) 250 MG tablet; 500mg on day 1 followed by 250mg on days 2 - 5    benzonatate (TESSALON) 200 MG capsule; Take 1 capsule by mouth 3 times daily as needed for Cough      No follow-ups on file.       Subjective   HPI    Atypical pneumonia-patient presents complaining of cough, postnasal drainage, plugged sinuses, and cough intermittently productive with green phlegm since .  Patient states that his wife had similar symptoms about a week before his started.  Patient is also complaining of some chills, but denies any known fever.  Patient has pressure and pain in the back of his head and states that his symptoms are worsening.  Patient denies any nausea, vomiting, or diarrhea ,but does have a somewhat elevated heart rate and low-grade temperature at today's encounter.  On exam the patient had crackles in his lung bases and will be treated for atypical pneumonia with azithromycin and was given benzonatate for coughing symptoms, especially at night when they are most bothersome.      Review of Systems   Constitutional:  Positive for chills. Negative for fever.   HENT:  Positive for congestion, postnasal drip and sinus pressure.    Respiratory:  Positive for cough. Negative for shortness of breath and wheezing.    Cardiovascular:  Negative for chest pain.   Gastrointestinal:  Negative for diarrhea, nausea and vomiting.   Skin:  Negative for rash.   Allergic/Immunologic: Negative for environmental allergies.   Neurological:  Positive for headaches.   Psychiatric/Behavioral:  Positive for sleep disturbance (Due to

## 2025-03-25 ASSESSMENT — ENCOUNTER SYMPTOMS
SHORTNESS OF BREATH: 0
SINUS PRESSURE: 1
COUGH: 1
DIARRHEA: 0
VOMITING: 0
NAUSEA: 0
WHEEZING: 0

## 2025-04-08 ENCOUNTER — OFFICE VISIT (OUTPATIENT)
Dept: ORTHOPEDIC SURGERY | Age: 60
End: 2025-04-08

## 2025-04-08 DIAGNOSIS — M17.12 PRIMARY OSTEOARTHRITIS OF LEFT KNEE: Primary | ICD-10-CM

## 2025-04-08 RX ORDER — METHYLPREDNISOLONE ACETATE 40 MG/ML
40 INJECTION, SUSPENSION INTRA-ARTICULAR; INTRALESIONAL; INTRAMUSCULAR; SOFT TISSUE ONCE
Status: COMPLETED | OUTPATIENT
Start: 2025-04-08 | End: 2025-04-08

## 2025-04-08 RX ADMIN — METHYLPREDNISOLONE ACETATE 40 MG: 40 INJECTION, SUSPENSION INTRA-ARTICULAR; INTRALESIONAL; INTRAMUSCULAR; SOFT TISSUE at 13:43

## 2025-04-08 NOTE — PROGRESS NOTES
Patient ID:  Sherman Jones  [id number]  60 y.o.  1965    Today: April 8, 2025          Chief Complaint:  Left Knee pain    HPI:       Sherman Jones is a 60 y.o. male seen for evaluation and treatment of left knee pain. Patient reports a longstanding history of pain involving the knee. The patient complains of knee pain with activities, reports pain as mostly occurring along the joint lines, reports stiffness of the knee with prolonged inactivity, and swelling/pain at the end of the day and after increased physical activity. Generally, symptoms improve with sitting/rest. The pain affects the patient’s activities of daily living and quality of life. Patient reports progressive pain and instability in the knee. The pain has been present for an extended period of time. Pain ranges from approximately 4-8 in a cyclical fashion with periods of acute exacerbation.     Patient has attempted prior conservative treatment including medications and activity modification +/- physical therapy. ALso, the patient has had a prior steroid injection which provided short term pain relief    Past Medical History:  Past Medical History:   Diagnosis Date    Alcoholism /alcohol abuse     Anxiety     Cardiomyopathy (HCC) 6/30/2016    Diverticular disease     colon resection with no symptoms since surgery    Essential hypertension     managed with med.      GERD (gastroesophageal reflux disease)     takes medication but not altogether relieved    GI bleed 2/15/2020    Hiatal hernia with gastroesophageal reflux 2/5/2013    Hypertension     managed with med.        Past Surgical History:  Past Surgical History:   Procedure Laterality Date    COLONOSCOPY  2020 2 in 2020    COLONOSCOPY  3/2018, 2008    10 years    COLONOSCOPY N/A 9/24/2020    COLONOSCOPY /29/ Room 630 performed by Jimmy Mahmood MD at [facility] ENDOSCOPY    COLONOSCOPY N/A 2/24/2020    COLONOSCOPY ROOM 517 performed by Robinson Lynn MD at [facility] ENDOSCOPY    CYST

## 2025-04-15 ENCOUNTER — OFFICE VISIT (OUTPATIENT)
Dept: ORTHOPEDIC SURGERY | Age: 60
End: 2025-04-15
Payer: COMMERCIAL

## 2025-04-15 DIAGNOSIS — M17.12 PRIMARY OSTEOARTHRITIS OF LEFT KNEE: Primary | ICD-10-CM

## 2025-04-15 PROCEDURE — 20610 DRAIN/INJ JOINT/BURSA W/O US: CPT | Performed by: ORTHOPAEDIC SURGERY

## 2025-04-15 NOTE — PROGRESS NOTES
Diagnosis: Left knee arthritis    Patient present today for the 2nd viscosupplimentation injection in the left knee. Prior injections have been tolerated well.    Risks, benefits, and alteratives were discussed with patient prior to injection.  A timeout was performed which included identifying the patient by name and date of birth, verifying correct procedure and correct site(s).     Under sterile technique, the left knee was injected with Euflexxa (2ml).  The patient tolerated the procedure well.    Patient is advised to ice the knee over the next week or so.  Continue taking prior oral medications.   The patient will follow-up as directed for final injection in the series.

## 2025-04-29 ENCOUNTER — OFFICE VISIT (OUTPATIENT)
Dept: ORTHOPEDIC SURGERY | Age: 60
End: 2025-04-29
Payer: COMMERCIAL

## 2025-04-29 DIAGNOSIS — M17.12 PRIMARY OSTEOARTHRITIS OF LEFT KNEE: Primary | ICD-10-CM

## 2025-04-29 PROCEDURE — 20610 DRAIN/INJ JOINT/BURSA W/O US: CPT | Performed by: ORTHOPAEDIC SURGERY

## 2025-04-29 NOTE — PROGRESS NOTES
Diagnosis: Left Knee arthritis    Patient present today for the third and final viscosupplimentation injection in the left knee. Prior injections have been tolerated well.    Risks, benefits, and alteratives were discussed with patient prior to injection.  A timeout was performed which included identifying the patient by name and date of birth, verifying correct procedure and correct site(s).     Under sterile technique, the left knee was injected with Euflexxa (2ml).  The patient tolerated the procedure well.    This is the third and final injection of the series.  Again the patient is advised to ice the knee over the next week or so.  Continue taking prior oral medications.   The patient can follow-up with me as needed.

## 2025-07-06 ENCOUNTER — HOSPITAL ENCOUNTER (OUTPATIENT)
Age: 60
Setting detail: OBSERVATION
Discharge: HOME | End: 2025-07-08
Attending: HOSPITALIST | Admitting: STUDENT IN AN ORGANIZED HEALTH CARE EDUCATION/TRAINING PROGRAM
Payer: COMMERCIAL

## 2025-07-06 ENCOUNTER — HOSPITAL ENCOUNTER (EMERGENCY)
Age: 60
Discharge: ANOTHER HOSPITAL | End: 2025-07-06
Attending: EMERGENCY MEDICINE
Payer: COMMERCIAL

## 2025-07-06 ENCOUNTER — APPOINTMENT (OUTPATIENT)
Dept: CT IMAGING | Age: 60
End: 2025-07-06
Payer: COMMERCIAL

## 2025-07-06 VITALS
RESPIRATION RATE: 10 BRPM | OXYGEN SATURATION: 95 % | BODY MASS INDEX: 27.86 KG/M2 | SYSTOLIC BLOOD PRESSURE: 152 MMHG | HEIGHT: 70 IN | DIASTOLIC BLOOD PRESSURE: 87 MMHG | TEMPERATURE: 99.1 F | WEIGHT: 194.6 LBS | HEART RATE: 90 BPM

## 2025-07-06 DIAGNOSIS — I63.9 CEREBROVASCULAR ACCIDENT (CVA), UNSPECIFIED MECHANISM (HCC): Primary | ICD-10-CM

## 2025-07-06 DIAGNOSIS — G45.4 TRANSIENT GLOBAL AMNESIA: Primary | ICD-10-CM

## 2025-07-06 PROBLEM — Z86.711 HISTORY OF PULMONARY EMBOLUS (PE): Status: ACTIVE | Noted: 2025-07-06

## 2025-07-06 LAB
ALBUMIN SERPL-MCNC: 4.2 G/DL (ref 3.2–4.6)
ALBUMIN/GLOB SERPL: 1.6 (ref 1–1.9)
ALP SERPL-CCNC: 44 U/L (ref 40–129)
ALT SERPL-CCNC: 14 U/L (ref 12–65)
ANION GAP SERPL CALC-SCNC: 11 MMOL/L (ref 7–16)
APPEARANCE UR: CLEAR
AST SERPL-CCNC: 19 U/L (ref 15–37)
BILIRUB SERPL-MCNC: 0.4 MG/DL (ref 0–1.2)
BILIRUB UR QL: NEGATIVE
BUN SERPL-MCNC: 11 MG/DL (ref 8–23)
CALCIUM SERPL-MCNC: 8.9 MG/DL (ref 8.8–10.2)
CHLORIDE SERPL-SCNC: 103 MMOL/L (ref 98–107)
CO2 SERPL-SCNC: 24 MMOL/L (ref 20–29)
COLOR UR: YELLOW
CREAT SERPL-MCNC: 0.78 MG/DL (ref 0.8–1.3)
ERYTHROCYTE [DISTWIDTH] IN BLOOD BY AUTOMATED COUNT: 12.6 % (ref 11.9–14.6)
GLOBULIN SER CALC-MCNC: 2.6 G/DL (ref 2.3–3.5)
GLUCOSE BLD STRIP.AUTO-MCNC: 113 MG/DL (ref 65–100)
GLUCOSE SERPL-MCNC: 99 MG/DL (ref 65–100)
GLUCOSE UR STRIP.AUTO-MCNC: NEGATIVE MG/DL
HCT VFR BLD AUTO: 43.8 % (ref 41.1–50.3)
HGB BLD-MCNC: 14.9 G/DL (ref 13.6–17.2)
HGB UR QL STRIP: NEGATIVE
INR PPP: 1
KETONES UR QL STRIP.AUTO: 15 MG/DL
LEUKOCYTE ESTERASE UR QL STRIP.AUTO: NEGATIVE
MAGNESIUM SERPL-MCNC: 1.7 MG/DL (ref 1.8–2.4)
MAGNESIUM SERPL-MCNC: 2.5 MG/DL (ref 1.8–2.4)
MCH RBC QN AUTO: 32.9 PG (ref 26.1–32.9)
MCHC RBC AUTO-ENTMCNC: 34 G/DL (ref 31.4–35)
MCV RBC AUTO: 96.7 FL (ref 82–102)
NITRITE UR QL STRIP.AUTO: NEGATIVE
NRBC # BLD: 0 K/UL (ref 0–0.2)
PH UR STRIP: 7 (ref 5–9)
PLATELET # BLD AUTO: 211 K/UL (ref 150–450)
PMV BLD AUTO: 9.2 FL (ref 9.4–12.3)
POTASSIUM SERPL-SCNC: 3.4 MMOL/L (ref 3.5–5.1)
POTASSIUM SERPL-SCNC: 3.7 MMOL/L (ref 3.5–5.1)
PROT SERPL-MCNC: 6.8 G/DL (ref 6.3–8.2)
PROT UR STRIP-MCNC: NEGATIVE MG/DL
PROTHROMBIN TIME: 13 SEC (ref 11.3–14.9)
RBC # BLD AUTO: 4.53 M/UL (ref 4.23–5.6)
SERVICE CMNT-IMP: ABNORMAL
SODIUM SERPL-SCNC: 138 MMOL/L (ref 133–143)
SP GR UR REFRACTOMETRY: 1.01 (ref 1–1.02)
UROBILINOGEN UR QL STRIP.AUTO: 0.2 EU/DL (ref 0.2–1)
WBC # BLD AUTO: 6 K/UL (ref 4.3–11.1)

## 2025-07-06 PROCEDURE — G0378 HOSPITAL OBSERVATION PER HR: HCPCS

## 2025-07-06 PROCEDURE — 84132 ASSAY OF SERUM POTASSIUM: CPT

## 2025-07-06 PROCEDURE — 83735 ASSAY OF MAGNESIUM: CPT

## 2025-07-06 PROCEDURE — 85027 COMPLETE CBC AUTOMATED: CPT

## 2025-07-06 PROCEDURE — 6360000004 HC RX CONTRAST MEDICATION: Performed by: EMERGENCY MEDICINE

## 2025-07-06 PROCEDURE — 82962 GLUCOSE BLOOD TEST: CPT

## 2025-07-06 PROCEDURE — 70450 CT HEAD/BRAIN W/O DYE: CPT

## 2025-07-06 PROCEDURE — 85610 PROTHROMBIN TIME: CPT

## 2025-07-06 PROCEDURE — G0379 DIRECT REFER HOSPITAL OBSERV: HCPCS

## 2025-07-06 PROCEDURE — 87086 URINE CULTURE/COLONY COUNT: CPT

## 2025-07-06 PROCEDURE — 70498 CT ANGIOGRAPHY NECK: CPT

## 2025-07-06 PROCEDURE — 93005 ELECTROCARDIOGRAM TRACING: CPT | Performed by: NURSE PRACTITIONER

## 2025-07-06 PROCEDURE — 80053 COMPREHEN METABOLIC PANEL: CPT

## 2025-07-06 PROCEDURE — 6360000002 HC RX W HCPCS: Performed by: STUDENT IN AN ORGANIZED HEALTH CARE EDUCATION/TRAINING PROGRAM

## 2025-07-06 PROCEDURE — 36415 COLL VENOUS BLD VENIPUNCTURE: CPT

## 2025-07-06 PROCEDURE — 96365 THER/PROPH/DIAG IV INF INIT: CPT

## 2025-07-06 PROCEDURE — 81003 URINALYSIS AUTO W/O SCOPE: CPT

## 2025-07-06 PROCEDURE — 6370000000 HC RX 637 (ALT 250 FOR IP): Performed by: STUDENT IN AN ORGANIZED HEALTH CARE EDUCATION/TRAINING PROGRAM

## 2025-07-06 PROCEDURE — 96366 THER/PROPH/DIAG IV INF ADDON: CPT

## 2025-07-06 RX ORDER — POTASSIUM CHLORIDE 1500 MG/1
40 TABLET, EXTENDED RELEASE ORAL PRN
Status: DISCONTINUED | OUTPATIENT
Start: 2025-07-06 | End: 2025-07-08 | Stop reason: HOSPADM

## 2025-07-06 RX ORDER — POTASSIUM CHLORIDE 7.45 MG/ML
10 INJECTION INTRAVENOUS PRN
Status: DISCONTINUED | OUTPATIENT
Start: 2025-07-06 | End: 2025-07-08 | Stop reason: HOSPADM

## 2025-07-06 RX ORDER — ASPIRIN 325 MG
325 TABLET ORAL DAILY
Status: DISCONTINUED | OUTPATIENT
Start: 2025-07-06 | End: 2025-07-06

## 2025-07-06 RX ORDER — SODIUM CHLORIDE 0.9 % (FLUSH) 0.9 %
5-40 SYRINGE (ML) INJECTION PRN
Status: DISCONTINUED | OUTPATIENT
Start: 2025-07-06 | End: 2025-07-08 | Stop reason: HOSPADM

## 2025-07-06 RX ORDER — SODIUM CHLORIDE 0.9 % (FLUSH) 0.9 %
5-40 SYRINGE (ML) INJECTION EVERY 12 HOURS SCHEDULED
Status: DISCONTINUED | OUTPATIENT
Start: 2025-07-06 | End: 2025-07-08 | Stop reason: HOSPADM

## 2025-07-06 RX ORDER — IOPAMIDOL 755 MG/ML
100 INJECTION, SOLUTION INTRAVASCULAR
Status: COMPLETED | OUTPATIENT
Start: 2025-07-06 | End: 2025-07-06

## 2025-07-06 RX ORDER — SODIUM CHLORIDE 9 MG/ML
INJECTION, SOLUTION INTRAVENOUS PRN
Status: DISCONTINUED | OUTPATIENT
Start: 2025-07-06 | End: 2025-07-08 | Stop reason: HOSPADM

## 2025-07-06 RX ORDER — ASPIRIN 300 MG/1
300 SUPPOSITORY RECTAL DAILY
Status: DISCONTINUED | OUTPATIENT
Start: 2025-07-06 | End: 2025-07-06

## 2025-07-06 RX ORDER — MAGNESIUM SULFATE IN WATER 40 MG/ML
2000 INJECTION, SOLUTION INTRAVENOUS PRN
Status: DISCONTINUED | OUTPATIENT
Start: 2025-07-06 | End: 2025-07-08 | Stop reason: HOSPADM

## 2025-07-06 RX ORDER — ENOXAPARIN SODIUM 100 MG/ML
40 INJECTION SUBCUTANEOUS DAILY
Status: DISCONTINUED | OUTPATIENT
Start: 2025-07-07 | End: 2025-07-08 | Stop reason: HOSPADM

## 2025-07-06 RX ORDER — GABAPENTIN 100 MG/1
100 CAPSULE ORAL NIGHTLY
Status: DISCONTINUED | OUTPATIENT
Start: 2025-07-07 | End: 2025-07-08 | Stop reason: HOSPADM

## 2025-07-06 RX ORDER — ATORVASTATIN CALCIUM 40 MG/1
80 TABLET, FILM COATED ORAL NIGHTLY
Status: DISCONTINUED | OUTPATIENT
Start: 2025-07-06 | End: 2025-07-08 | Stop reason: HOSPADM

## 2025-07-06 RX ORDER — ACETAMINOPHEN 325 MG/1
650 TABLET ORAL EVERY 6 HOURS PRN
Status: DISCONTINUED | OUTPATIENT
Start: 2025-07-06 | End: 2025-07-08 | Stop reason: HOSPADM

## 2025-07-06 RX ORDER — ACETAMINOPHEN 650 MG/1
650 SUPPOSITORY RECTAL EVERY 6 HOURS PRN
Status: DISCONTINUED | OUTPATIENT
Start: 2025-07-06 | End: 2025-07-08 | Stop reason: HOSPADM

## 2025-07-06 RX ORDER — ONDANSETRON 2 MG/ML
4 INJECTION INTRAMUSCULAR; INTRAVENOUS EVERY 6 HOURS PRN
Status: DISCONTINUED | OUTPATIENT
Start: 2025-07-06 | End: 2025-07-08 | Stop reason: HOSPADM

## 2025-07-06 RX ORDER — POLYETHYLENE GLYCOL 3350 17 G/17G
17 POWDER, FOR SOLUTION ORAL DAILY PRN
Status: DISCONTINUED | OUTPATIENT
Start: 2025-07-06 | End: 2025-07-07 | Stop reason: SDUPTHER

## 2025-07-06 RX ORDER — POLYETHYLENE GLYCOL 3350 17 G/17G
17 POWDER, FOR SOLUTION ORAL DAILY PRN
Status: DISCONTINUED | OUTPATIENT
Start: 2025-07-06 | End: 2025-07-08 | Stop reason: HOSPADM

## 2025-07-06 RX ORDER — ONDANSETRON 4 MG/1
4 TABLET, ORALLY DISINTEGRATING ORAL EVERY 8 HOURS PRN
Status: DISCONTINUED | OUTPATIENT
Start: 2025-07-06 | End: 2025-07-08 | Stop reason: HOSPADM

## 2025-07-06 RX ADMIN — POTASSIUM CHLORIDE 40 MEQ: 1500 TABLET, EXTENDED RELEASE ORAL at 19:42

## 2025-07-06 RX ADMIN — IOPAMIDOL 100 ML: 755 INJECTION, SOLUTION INTRAVENOUS at 13:58

## 2025-07-06 RX ADMIN — MAGNESIUM SULFATE HEPTAHYDRATE 2000 MG: 40 INJECTION, SOLUTION INTRAVENOUS at 19:41

## 2025-07-06 ASSESSMENT — LIFESTYLE VARIABLES
HOW MANY STANDARD DRINKS CONTAINING ALCOHOL DO YOU HAVE ON A TYPICAL DAY: PATIENT DOES NOT DRINK
HOW OFTEN DO YOU HAVE A DRINK CONTAINING ALCOHOL: NEVER

## 2025-07-06 ASSESSMENT — PAIN - FUNCTIONAL ASSESSMENT: PAIN_FUNCTIONAL_ASSESSMENT: 0-10

## 2025-07-06 ASSESSMENT — PAIN SCALES - GENERAL: PAINLEVEL_OUTOF10: 0

## 2025-07-06 NOTE — PROGRESS NOTES
4 Eyes Skin Assessment     NAME:  Sherman Jones  YOB: 1965  MEDICAL RECORD NUMBER:  [id number]    The patient is being assessed for  Admission    I agree that at least one RN has performed a thorough Head to Toe Skin Assessment on the patient. ALL assessment sites listed below have been assessed.      Areas assessed by both nurses:    Head, Face, Ears, Shoulders, Back, Chest, Arms, Elbows, Hands, Sacrum. Buttock, Coccyx, Ischium, Legs. Feet and Heels, and Under Medical Devices         Does the Patient have a Wound? No noted wound(s)       Phu Prevention initiated by RN: No  Wound Care Orders initiated by RN: No    Pressure Injury (Stage 1,2,3,4, Unstageable, DTI, NWPT, and Complex wounds) if present, place Wound referral order by RN under : No    New Ostomies, if present place, Ostomy referral order under : No     Nurse 1 eSignature: Electronically signed by Imani Darby RN on 7/6/25 at 5:52 PM EDT    **SHARE this note so that the co-signing nurse can place an eSignature**    Nurse 2 eSignature: {Esignature:[id number]}

## 2025-07-06 NOTE — ED NOTES
TRANSFER - OUT REPORT:    Verbal report given to BARBIE Diallo on Sherman Jones  being transferred to [place] [address] for routine progression of patient care       Report consisted of patient's Situation, Background, Assessment and   Recommendations(SBAR).     Information from the following report(s) Nurse Handoff Report, Index, ED Encounter Summary, ED SBAR, Med Rec Status, Neuro Assessment, and Event Log was reviewed with the receiving nurse.    Lines:   Peripheral IV 07/06/25 Right Antecubital (Active)   Site Assessment Clean, dry & intact 07/06/25 1251   Line Status Blood return noted;Brisk blood return;Normal saline locked;Specimen collected 07/06/25 1251   Phlebitis Assessment No symptoms 07/06/25 1251   Infiltration Assessment 0 07/06/25 1251   Dressing Status New dressing applied 07/06/25 1251   Dressing Type Transparent 07/06/25 1251   Dressing Intervention New 07/06/25 1251        Opportunity for questions and clarification was provided.      Patient transported with:  Monitor

## 2025-07-06 NOTE — PROGRESS NOTES
Hourly rounding completed during shift. All needs met at this time. Bed L/L with call bell in reach, family at side. MRI questionnaire completed. Report given to oncoming RN.

## 2025-07-06 NOTE — H&P
suppository 300 mg    polyethylene glycol (GLYCOLAX) packet 17 g       Prior to Admit Medications:  Current Outpatient Medications   Medication Instructions    ascorbic acid (VITAMIN C) 1,000 mg    gabapentin (NEURONTIN) 100-300 mg, Oral, NIGHTLY    gabapentin (NEURONTIN) 100-300 mg, Oral, NIGHTLY    Multiple Vitamins-Minerals (CENTRUM SILVER ADULT 50+) TABS     sertraline (ZOLOFT) 50 mg, Oral, DAILY       I have personally reviewed labs and tests:  Recent Results (from the past 24 hours)   POCT Glucose    Collection Time: 07/06/25 12:45 PM   Result Value Ref Range    POC Glucose 113 (H) 65 - 100 mg/dL    Performed by: [facility]    CBC    Collection Time: 07/06/25 12:53 PM   Result Value Ref Range    WBC 6.0 4.3 - 11.1 K/uL    RBC 4.53 4.23 - 5.60 M/uL    Hemoglobin 14.9 13.6 - 17.2 g/dL    Hematocrit 43.8 41.1 - 50.3 %    MCV 96.7 82.0 - 102.0 FL    MCH 32.9 26.1 - 32.9 PG    MCHC 34.0 31.4 - 35.0 g/dL    RDW 12.6 11.9 - 14.6 %    Platelets 211 150 - 450 K/uL    MPV 9.2 (L) 9.4 - 12.3 FL    nRBC 0.00 0.0 - 0.2 K/uL   Comprehensive Metabolic Panel    Collection Time: 07/06/25 12:53 PM   Result Value Ref Range    Sodium 138 133 - 143 mmol/L    Potassium 3.4 (L) 3.5 - 5.1 mmol/L    Chloride 103 98 - 107 mmol/L    CO2 24 20 - 29 mmol/L    Anion Gap 11 7 - 16 mmol/L    Glucose 99 65 - 100 mg/dL    BUN 11 8 - 23 MG/DL    Creatinine 0.78 (L) 0.80 - 1.30 MG/DL    Est, Glom Filt Rate >90 >60 ml/min/1.73m2    Calcium 8.9 8.8 - 10.2 MG/DL    Total Bilirubin 0.4 0.0 - 1.2 MG/DL    ALT 14 12 - 65 U/L    AST 19 15 - 37 U/L    Alk Phosphatase 44 40 - 129 U/L    Total Protein 6.8 6.3 - 8.2 g/dL    Albumin 4.2 3.2 - 4.6 g/dL    Globulin 2.6 2.3 - 3.5 g/dL    Albumin/Globulin Ratio 1.6 1.0 - 1.9     Magnesium    Collection Time: 07/06/25 12:53 PM   Result Value Ref Range    Magnesium 1.7 (L) 1.8 - 2.4 mg/dL   Protime-INR    Collection Time: 07/06/25 12:53 PM   Result Value Ref Range    Protime 13.0 11.3 - 14.9 sec    INR 1.0      Urinalysis    Collection Time: 07/06/25  2:57 PM   Result Value Ref Range    Color, UA YELLOW      Appearance CLEAR      Specific Gravity, UA 1.010 1.001 - 1.023      pH, Urine 7.0 5.0 - 9.0      Protein, UA Negative NEG mg/dL    Glucose, Ur Negative NEG mg/dL    Ketones, Urine 15 (A) NEG mg/dL    Bilirubin, Urine Negative NEG      Blood, Urine Negative NEG      Urobilinogen, Urine 0.2 0.2 - 1.0 EU/dL    Nitrite, Urine Negative NEG      Leukocyte Esterase, Urine Negative NEG         No results for input(s): \"COVID19\" in the last 72 hours.    CTA HEAD NECK W WO CONTRAST  Result Date: 7/6/2025  EXAMINATION: CTA HEAD NECK W WO CONTRAST 7/6/2025 2:07 PM ACCESSION NUMBER: [id number] COMPARISON: CT head without contrast 7/6/2025 INDICATION: Confusion TECHNIQUE: Dedicated contrast enhanced CT of the arteries of the head and neck was performed with axial images following a timed vascular bolus of 100 mL of Isovue-370.  Coronal and sagittal reformats are provided.  3-D reconstructions are provided. Radiation dose reduction techniques were used for this study. Our CT scanners use one or all of the following: Automated exposure control, adjustment of the mA and/or kV according to patient size, iterative reconstruction. FINDINGS: There is no aneurysm or dissection involving the aortic arch or imaged portions of the ascending and descending thoracic aorta. Arch anatomy is bovine, an anatomic variant. There is normal enhancement of the bilateral common carotid, internal and external carotid arteries and their branches. The internal carotid arteries are partially submucosal a anatomic variant. There is minimal calcific atherosclerosis at the left carotid bifurcation. There is no high-grade stenosis or occlusion of the ICAs. The anterior and middle cerebral arteries and their branches enhance normally. 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

## 2025-07-06 NOTE — ED TRIAGE NOTES
Pt ambulatory to ED with spouse. Wife states they were at the pool approx 30 min PTA and pt suddenly became very confused. Unsure of how he got to the pool and could not remember hsi grandkids names. Pt currently A&Ox3. RUST 1 in triage. .

## 2025-07-06 NOTE — PROGRESS NOTES
4 Eyes Skin Assessment     NAME:  Sherman Jones  YOB: 1965  MEDICAL RECORD NUMBER:  [id number]    The patient is being assessed for  Admission    I agree that at least one RN has performed a thorough Head to Toe Skin Assessment on the patient. ALL assessment sites listed below have been assessed.      Areas assessed by both nurses:    Head, Face, Ears, Shoulders, Back, Chest, Arms, Elbows, Hands, Sacrum. Buttock, Coccyx, Ischium, Legs. Feet and Heels, and Under Medical Devices         Does the Patient have a Wound? No noted wound(s)       Phu Prevention initiated by RN: No  Wound Care Orders initiated by RN: No    Pressure Injury (Stage 1,2,3,4, Unstageable, DTI, NWPT, and Complex wounds) if present, place Wound referral order by RN under : No    New Ostomies, if present place, Ostomy referral order under : No     Nurse 1 eSignature: Electronically signed by Imani Darby RN on 7/6/25 at 5:27 PM EDT    **SHARE this note so that the co-signing nurse can place an eSignature**    Nurse 2 eSignature: Electronically signed by Jocelyne Gonzales RN on 7/6/25 at 7:22 PM EDT

## 2025-07-06 NOTE — PROGRESS NOTES
TRANSFER - IN REPORT:    Verbal report received from BARBIE Galloway on hSerman Jones  being received from [facility] for routine progression of patient care      Report consisted of patient's Situation, Background, Assessment and   Recommendations(SBAR).     Information from the following report(s) ED Encounter Summary, Adult Overview, Intake/Output, MAR, and Recent Results was reviewed with the receiving nurse.    Opportunity for questions and clarification was provided.      Assessment to be completed upon patient's arrival to unit and care assumed.

## 2025-07-06 NOTE — ED NOTES
Success  A new connect request was successfully created for:  Sherman Jones  : 1965  MRN: [id number]  ConnectID: [id number]  REASON:  Code Stroke TLKW less than 4.5 hours  ACUITY:  Code Stroke TLKW <4.5  SUBMITTED:

## 2025-07-06 NOTE — ED PROVIDER NOTES
Emergency Department Provider Note       PCP: File, Not On   Age: 60 y.o.   Sex: male     DISPOSITION Decision To Transfer 07/06/2025 03:19:28 PM   DISPOSITION CONDITION Stable            ICD-10-CM    1. Transient global amnesia  G45.4           Medical Decision Making     Code stroke was called at the time of my evaluation.    Patient does have a remote history of PE but he is no longer on any anticoagulants.  ED Course as of 07/06/25 1520   Sun Jul 06, 2025   1401 I discussed the case with on-call for telemetry neurology.  At this time patient's presentation is most consistent with transient global amnesia.  I will get a CTA of his head and neck to rule out any occult arterial occlusion.  Plan will be to end up admitting him for further evaluation. [AC]      ED Course User Index  [AC] Syed Sanchez MD       CTA is negative.      1 or more acute illnesses that pose a threat to life or bodily function.   Shared medical decision making was utilized in creating the patients health plan today.    I independently ordered and reviewed each unique test.       I interpreted the CT Scan no obvious intracranial hemorrhage or mass.    The patient was admitted and I have discussed patient management with the admitting provider.  The management of this patient was discussed with an external consultant.        Critical care procedure note : 30 minutes of critical care time was performed in the emergency department. This was separate from any other procedures listed during the patients emergency department course. The failure to initiate these interventions on an urgent basis would likely have resulted in sudden, clinically significant or life-threatening deterioration in the patients condition.     History     60-year-old gentleman presents with his wife with concerns about sudden onset of confusion.  This started at approximately 12:10 PM today.  Wife says that they took their grandkids to the pool and while there the  study. Our CT scanners  use one or all of the following: Automated exposure control, adjustment of the  mA and/or kV according to patient size, iterative reconstruction.      FINDINGS:    There is no aneurysm or dissection involving the aortic arch or imaged portions  of the ascending and descending thoracic aorta. Arch anatomy is bovine, an  anatomic variant. There is normal enhancement of the bilateral common carotid,  internal and external carotid arteries and their branches. The internal carotid  arteries are partially submucosal a anatomic variant. There is minimal calcific  atherosclerosis at the left carotid bifurcation. There is no high-grade stenosis  or occlusion of the ICAs. The anterior and middle cerebral arteries and their  branches enhance normally. Anatomic variant of hypoplastic right anterior  cerebral artery A1 segment with the right A2 segment arising from the anterior  communicating artery is noted. There is normal enhancement of the vertebral  arteries, basilar, and posterior cerebral arteries. There is no aneurysm or  vascular malformation. The deep venous sinuses enhance normally. The right  jugular vein is dominant.      Impression    1.  No high-grade stenosis or large vessel occlusion of the head and neck  arterial vasculature.  2.  Anatomic variants of bovine aortic arch as well as hypoplastic right  anterior cerebral artery A1 segment.  3.  Partially submucosal internal carotid arteries a anatomic variant. If neck  surgery at some point is contemplated care should be taken.      Electronically signed by Leeroy Callejas   CBC   Result Value Ref Range    WBC 6.0 4.3 - 11.1 K/uL    RBC 4.53 4.23 - 5.60 M/uL    Hemoglobin 14.9 13.6 - 17.2 g/dL    Hematocrit 43.8 41.1 - 50.3 %    MCV 96.7 82.0 - 102.0 FL    MCH 32.9 26.1 - 32.9 PG    MCHC 34.0 31.4 - 35.0 g/dL    RDW 12.6 11.9 - 14.6 %    Platelets 211 150 - 450 K/uL    MPV 9.2 (L) 9.4 - 12.3 FL    nRBC 0.00 0.0 - 0.2 K/uL   Comprehensive Metabolic

## 2025-07-07 ENCOUNTER — APPOINTMENT (OUTPATIENT)
Dept: NON INVASIVE DIAGNOSTICS | Age: 60
End: 2025-07-07
Attending: STUDENT IN AN ORGANIZED HEALTH CARE EDUCATION/TRAINING PROGRAM
Payer: COMMERCIAL

## 2025-07-07 ENCOUNTER — APPOINTMENT (OUTPATIENT)
Dept: MRI IMAGING | Age: 60
End: 2025-07-07
Attending: HOSPITALIST
Payer: COMMERCIAL

## 2025-07-07 LAB
ANION GAP SERPL CALC-SCNC: 9 MMOL/L (ref 7–16)
BUN SERPL-MCNC: 8 MG/DL (ref 8–23)
CALCIUM SERPL-MCNC: 9 MG/DL (ref 8.8–10.2)
CHLORIDE SERPL-SCNC: 106 MMOL/L (ref 98–107)
CHOLEST SERPL-MCNC: 190 MG/DL (ref 0–200)
CO2 SERPL-SCNC: 24 MMOL/L (ref 20–29)
CREAT SERPL-MCNC: 0.76 MG/DL (ref 0.8–1.3)
ECHO AO ASC DIAM: 3.3 CM
ECHO AO ASCENDING AORTA INDEX: 1.61 CM/M2
ECHO AO ROOT DIAM: 3.5 CM
ECHO AO ROOT INDEX: 1.71 CM/M2
ECHO AV AREA PEAK VELOCITY: 2.3 CM2
ECHO AV AREA VTI: 2.4 CM2
ECHO AV AREA/BSA PEAK VELOCITY: 1.1 CM2/M2
ECHO AV AREA/BSA VTI: 1.2 CM2/M2
ECHO AV CUSP MM: 2.1 CM
ECHO AV MEAN GRADIENT: 4 MMHG
ECHO AV MEAN VELOCITY: 0.9 M/S
ECHO AV PEAK GRADIENT: 9 MMHG
ECHO AV PEAK GRADIENT: 9 MMHG
ECHO AV PEAK VELOCITY: 1.5 M/S
ECHO AV VELOCITY RATIO: 0.73
ECHO AV VTI: 25.7 CM
ECHO BSA: 2.08 M2
ECHO EST RA PRESSURE: 3 MMHG
ECHO IVC PROX: 1 CM
ECHO LA AREA 2C: 17.7 CM2
ECHO LA AREA 4C: 25 CM2
ECHO LA DIAMETER INDEX: 2.34 CM/M2
ECHO LA DIAMETER: 4.8 CM
ECHO LA MAJOR AXIS: 6.5 CM
ECHO LA MINOR AXIS: 6.4 CM
ECHO LA TO AORTIC ROOT RATIO: 1.37
ECHO LA VOL BP: 56 ML (ref 18–58)
ECHO LA VOL MOD A2C: 41 ML (ref 18–58)
ECHO LA VOL MOD A4C: 78 ML (ref 18–58)
ECHO LA VOL/BSA BIPLANE: 27 ML/M2 (ref 16–34)
ECHO LA VOLUME INDEX MOD A2C: 20 ML/M2 (ref 16–34)
ECHO LA VOLUME INDEX MOD A4C: 38 ML/M2 (ref 16–34)
ECHO LV E' LATERAL VELOCITY: 14.4 CM/S
ECHO LV E' SEPTAL VELOCITY: 11.1 CM/S
ECHO LV EDV A2C: 142 ML
ECHO LV EDV A4C: 194 ML
ECHO LV EDV INDEX A4C: 95 ML/M2
ECHO LV EDV NDEX A2C: 69 ML/M2
ECHO LV EF PHYSICIAN: 60 %
ECHO LV EJECTION FRACTION A2C: 65 %
ECHO LV EJECTION FRACTION A4C: 62 %
ECHO LV EJECTION FRACTION BIPLANE: 64 % (ref 55–100)
ECHO LV ESV A2C: 49 ML
ECHO LV ESV A4C: 73 ML
ECHO LV ESV INDEX A2C: 24 ML/M2
ECHO LV ESV INDEX A4C: 36 ML/M2
ECHO LV FRACTIONAL SHORTENING: 35 % (ref 28–44)
ECHO LV INTERNAL DIMENSION DIASTOLE INDEX: 2.63 CM/M2
ECHO LV INTERNAL DIMENSION DIASTOLIC: 5.4 CM (ref 4.2–5.9)
ECHO LV INTERNAL DIMENSION SYSTOLIC INDEX: 1.71 CM/M2
ECHO LV INTERNAL DIMENSION SYSTOLIC: 3.5 CM
ECHO LV IVSD: 0.9 CM (ref 0.6–1)
ECHO LV MASS 2D: 167.4 G (ref 88–224)
ECHO LV MASS INDEX 2D: 81.6 G/M2 (ref 49–115)
ECHO LV POSTERIOR WALL DIASTOLIC: 0.8 CM (ref 0.6–1)
ECHO LV RELATIVE WALL THICKNESS RATIO: 0.3
ECHO LVOT AREA: 3.1 CM2
ECHO LVOT AV VTI INDEX: 0.76
ECHO LVOT DIAM: 2 CM
ECHO LVOT MEAN GRADIENT: 2 MMHG
ECHO LVOT PEAK GRADIENT: 5 MMHG
ECHO LVOT PEAK VELOCITY: 1.1 M/S
ECHO LVOT STROKE VOLUME INDEX: 29.9 ML/M2
ECHO LVOT SV: 61.2 ML
ECHO LVOT VTI: 19.5 CM
ECHO MV A VELOCITY: 0.63 M/S
ECHO MV AREA VTI: 3.5 CM2
ECHO MV E DECELERATION TIME (DT): 172 MS
ECHO MV E VELOCITY: 0.65 M/S
ECHO MV E/A RATIO: 1.03
ECHO MV E/E' LATERAL: 4.51
ECHO MV E/E' RATIO (AVERAGED): 5.18
ECHO MV E/E' SEPTAL: 5.86
ECHO MV LVOT VTI INDEX: 0.89
ECHO MV MAX VELOCITY: 0.8 M/S
ECHO MV MEAN GRADIENT: 1 MMHG
ECHO MV MEAN VELOCITY: 0.4 M/S
ECHO MV PEAK GRADIENT: 2 MMHG
ECHO MV VTI: 17.3 CM
ECHO PULMONARY ARTERY END DIASTOLIC PRESSURE: 5 MMHG
ECHO PV ACCELERATION TIME (AT): 108 MS
ECHO PV MAX VELOCITY: 0.9 M/S
ECHO PV PEAK GRADIENT: 4 MMHG
ECHO PV REGURGITANT END DIASTOLIC VELOCITY: 1.1 M/S
ECHO RA AREA 4C: 16.6 CM2
ECHO RA END SYSTOLIC VOLUME APICAL 4 CHAMBER INDEX BSA: 20 ML/M2
ECHO RA VOLUME: 42 ML
ECHO RV BASAL DIMENSION: 4.2 CM
ECHO RV FREE WALL PEAK S': 27.3 CM/S
ECHO RV INTERNAL DIMENSION: 3.6 CM
ECHO RV MID DIMENSION: 3.4 CM
ECHO RV TAPSE: 3.3 CM (ref 1.7–?)
EKG ATRIAL RATE: 70 BPM
EKG DIAGNOSIS: NORMAL
EKG P AXIS: 32 DEGREES
EKG P-R INTERVAL: 154 MS
EKG Q-T INTERVAL: 394 MS
EKG QRS DURATION: 102 MS
EKG QTC CALCULATION (BAZETT): 425 MS
EKG R AXIS: 31 DEGREES
EKG T AXIS: -76 DEGREES
EKG VENTRICULAR RATE: 70 BPM
ERYTHROCYTE [DISTWIDTH] IN BLOOD BY AUTOMATED COUNT: 13 % (ref 11.9–14.6)
EST. AVERAGE GLUCOSE BLD GHB EST-MCNC: 122 MG/DL
GLUCOSE SERPL-MCNC: 109 MG/DL (ref 70–99)
HBA1C MFR BLD: 5.9 % (ref 0–5.6)
HCT VFR BLD AUTO: 43.3 % (ref 41.1–50.3)
HDLC SERPL-MCNC: 43 MG/DL (ref 40–60)
HDLC SERPL: 4.4 (ref 0–5)
HGB BLD-MCNC: 14.4 G/DL (ref 13.6–17.2)
LDLC SERPL CALC-MCNC: 128 MG/DL (ref 0–100)
MCH RBC QN AUTO: 32.7 PG (ref 26.1–32.9)
MCHC RBC AUTO-ENTMCNC: 33.3 G/DL (ref 31.4–35)
MCV RBC AUTO: 98.4 FL (ref 82–102)
NRBC # BLD: 0 K/UL (ref 0–0.2)
PLATELET # BLD AUTO: 190 K/UL (ref 150–450)
PMV BLD AUTO: 9.2 FL (ref 9.4–12.3)
POTASSIUM SERPL-SCNC: 4.1 MMOL/L (ref 3.5–5.1)
RBC # BLD AUTO: 4.4 M/UL (ref 4.23–5.6)
SODIUM SERPL-SCNC: 140 MMOL/L (ref 136–145)
TRIGL SERPL-MCNC: 93 MG/DL (ref 0–150)
VLDLC SERPL CALC-MCNC: 19 MG/DL (ref 6–23)
WBC # BLD AUTO: 4.5 K/UL (ref 4.3–11.1)

## 2025-07-07 PROCEDURE — G0378 HOSPITAL OBSERVATION PER HR: HCPCS

## 2025-07-07 PROCEDURE — 85027 COMPLETE CBC AUTOMATED: CPT

## 2025-07-07 PROCEDURE — 6370000000 HC RX 637 (ALT 250 FOR IP): Performed by: STUDENT IN AN ORGANIZED HEALTH CARE EDUCATION/TRAINING PROGRAM

## 2025-07-07 PROCEDURE — 83036 HEMOGLOBIN GLYCOSYLATED A1C: CPT

## 2025-07-07 PROCEDURE — 93010 ELECTROCARDIOGRAM REPORT: CPT | Performed by: INTERNAL MEDICINE

## 2025-07-07 PROCEDURE — 2500000003 HC RX 250 WO HCPCS: Performed by: STUDENT IN AN ORGANIZED HEALTH CARE EDUCATION/TRAINING PROGRAM

## 2025-07-07 PROCEDURE — 6370000000 HC RX 637 (ALT 250 FOR IP): Performed by: INTERNAL MEDICINE

## 2025-07-07 PROCEDURE — 92523 SPEECH SOUND LANG COMPREHEN: CPT

## 2025-07-07 PROCEDURE — 97161 PT EVAL LOW COMPLEX 20 MIN: CPT

## 2025-07-07 PROCEDURE — 93306 TTE W/DOPPLER COMPLETE: CPT

## 2025-07-07 PROCEDURE — 99222 1ST HOSP IP/OBS MODERATE 55: CPT | Performed by: NURSE PRACTITIONER

## 2025-07-07 PROCEDURE — 6360000002 HC RX W HCPCS: Performed by: STUDENT IN AN ORGANIZED HEALTH CARE EDUCATION/TRAINING PROGRAM

## 2025-07-07 PROCEDURE — 93306 TTE W/DOPPLER COMPLETE: CPT | Performed by: INTERNAL MEDICINE

## 2025-07-07 PROCEDURE — 36415 COLL VENOUS BLD VENIPUNCTURE: CPT

## 2025-07-07 PROCEDURE — 70551 MRI BRAIN STEM W/O DYE: CPT

## 2025-07-07 PROCEDURE — 97165 OT EVAL LOW COMPLEX 30 MIN: CPT

## 2025-07-07 PROCEDURE — 80061 LIPID PANEL: CPT

## 2025-07-07 PROCEDURE — 96372 THER/PROPH/DIAG INJ SC/IM: CPT

## 2025-07-07 PROCEDURE — 80048 BASIC METABOLIC PNL TOTAL CA: CPT

## 2025-07-07 RX ORDER — LORAZEPAM 1 MG/1
1 TABLET ORAL ONCE
Status: COMPLETED | OUTPATIENT
Start: 2025-07-07 | End: 2025-07-07

## 2025-07-07 RX ORDER — CALCIUM CARBONATE 500 MG/1
500 TABLET, CHEWABLE ORAL 3 TIMES DAILY PRN
Status: DISCONTINUED | OUTPATIENT
Start: 2025-07-07 | End: 2025-07-08 | Stop reason: HOSPADM

## 2025-07-07 RX ADMIN — SODIUM CHLORIDE, PRESERVATIVE FREE 10 ML: 5 INJECTION INTRAVENOUS at 07:51

## 2025-07-07 RX ADMIN — CALCIUM CARBONATE (ANTACID) CHEW TAB 500 MG 500 MG: 500 CHEW TAB at 07:52

## 2025-07-07 RX ADMIN — SODIUM CHLORIDE, PRESERVATIVE FREE 10 ML: 5 INJECTION INTRAVENOUS at 20:53

## 2025-07-07 RX ADMIN — ATORVASTATIN CALCIUM 80 MG: 40 TABLET, FILM COATED ORAL at 20:53

## 2025-07-07 RX ADMIN — ACETAMINOPHEN 650 MG: 325 TABLET ORAL at 10:46

## 2025-07-07 RX ADMIN — LORAZEPAM 1 MG: 1 TABLET ORAL at 11:57

## 2025-07-07 RX ADMIN — ENOXAPARIN SODIUM 40 MG: 100 INJECTION SUBCUTANEOUS at 07:52

## 2025-07-07 RX ADMIN — GABAPENTIN 100 MG: 100 CAPSULE ORAL at 20:53

## 2025-07-07 NOTE — PROGRESS NOTES
OCCUPATIONAL THERAPY EVALUATION & DISCHARGE  Acknowledge Orders  Time  OT Charge Capture  Rehab Caseload Tracker      Sherman Jones is a 60 y.o. male   PRIMARY DIAGNOSIS: Transient global amnesia  Transient global amnesia [G45.4]       Reason for Referral: Generalized Muscle Weakness (M62.81)  Observation: Payor: [employer] / Plan: [employer] OPEN ACCESS PLUS (OAP) / Product Type: *No Product type* /     ASSESSMENT:     REHAB RECOMMENDATIONS:   Recommendation to date pending progress:  Setting:  No additional occupational therapy recommended    Equipment:    None     ASSESSMENT:  Mr. Jones presented independent with ADLs and with mobility and deficits have resolved. Patient appears to be functioning at baseline and has no additional occupational therapy needs at this time. Will sign off.     [facility] AM-PAC™ “6 Clicks” Daily Activity Inpatient Short Form:    AM-PAC Daily Activity - Inpatient   How much help is needed for putting on and taking off regular lower body clothing?: None  How much help is needed for bathing (which includes washing, rinsing, drying)?: None  How much help is needed for toileting (which includes using toilet, bedpan, or urinal)?: None  How much help is needed for putting on and taking off regular upper body clothing?: None  How much help is needed for taking care of personal grooming?: None  How much help for eating meals?: None  AM-[facility] Inpatient Daily Activity Raw Score: 24  AM-PAC Inpatient ADL T-Scale Score : 57.54  ADL Inpatient CMS 0-100% Score: 0  ADL Inpatient CMS G-Code Modifier : CH           SUBJECTIVE:     Mr. Jones states, \"That was scary\"     Social/Functional      OBJECTIVE:     LINES / DRAINS / AIRWAY: None    RESTRICTIONS/PRECAUTIONS:       PAIN: VITALS / O2:   Pre Treatment:    0      Post Treatment: 0       Vitals          Oxygen            GROSS EVALUATION: INTACT IMPAIRED   (See Comments)   UE AROM [x] []   UE PROM [] []   Strength [x]       Posture / Balance [x]

## 2025-07-07 NOTE — PROGRESS NOTES
Pt experienced brief short term memory loss this shift that has improved. Pt states he is still unable to recall events that led to him being admitted into hospital. Mg and K replaced per protocol. Monitor room called to notify that patient was running normal sinus with several PVCs. Attending was notified and EKG ordered. Follow up labs K 4.1, Mg 2.5.   Hourly and PRN rounds performed during this shift; all needs met at this time. Bed in low/locked position, family at bedside, and call light, personal items within reach.

## 2025-07-07 NOTE — PROGRESS NOTES
Hospitalist Progress Note   Admit Date:  2025  4:41 PM   Name:  Sherman Jones   Age:  60 y.o.  Sex:  male  :  1965   MRN:  [id number]   Room:  [facility]/    Presenting/Chief Complaint: transient loss of memory   Reason(s) for Admission: Transient global amnesia [G45.4]     Hospital Course:   Sherman Jones is a 60 y.o. male with medical history of   Pulmonary embolism, not on anticoagulation  History of GI bleeding  GERD  AISHA  Anxiety     who presented with sudden onset of confusion on the day of admission.     Patient has been active in terms of physical activities.  He was planning to go to the pool, but when he arrived at the pool patient was noted to look into space and appeared confused as to how they got to the pool.  No abnormal movements.  No localized weakness.     Patient went to emergency room in [place].  Hemodynamically he was stable.  CT scan of the head and head and neck CTA showed no abnormalities.  Patient was evaluated by telemetry neurology.  He was admitted with a plan for MRI and TTE he was treated for possible stroke at this time. .    Subjective & 24hr Events:     Patient is seen and examined at bedside.    Patient is resting in bed.  He could talk and answer my questions well.  He admitted to having blank of memory for about few hours before coming to hospital.   He is reporting feeling back to normal now.   He denies localized weakness.   No respiratory distress.       Assessment & Plan:     Principal Problem:    Transient global amnesia  Plan:   On high intensity atorvastatin.   Patient has significant GI bleeding history, and so aspirin is not given at this time.   Pending more input from in-house neurologist.   Waiting for MRI and TTE.   Physical therapy    Active Problems:    History of GI bleed  Plan:   Relatively this contraindicates for aspirin.   Monitor.       GERD (gastroesophageal reflux disease)  Plan:   Symptomatic treatments       AISHA (obstructive  12:53 PM   Result Value Ref Range    Protime 13.0 11.3 - 14.9 sec    INR 1.0     Urinalysis    Collection Time: 07/06/25  2:57 PM   Result Value Ref Range    Color, UA YELLOW      Appearance CLEAR      Specific Gravity, UA 1.010 1.001 - 1.023      pH, Urine 7.0 5.0 - 9.0      Protein, UA Negative NEG mg/dL    Glucose, Ur Negative NEG mg/dL    Ketones, Urine 15 (A) NEG mg/dL    Bilirubin, Urine Negative NEG      Blood, Urine Negative NEG      Urobilinogen, Urine 0.2 0.2 - 1.0 EU/dL    Nitrite, Urine Negative NEG      Leukocyte Esterase, Urine Negative NEG     EKG 12 Lead    Collection Time: 07/06/25  9:42 PM   Result Value Ref Range    Ventricular Rate 70 BPM    Atrial Rate 70 BPM    P-R Interval 154 ms    QRS Duration 102 ms    Q-T Interval 394 ms    QTc Calculation (Bazett) 425 ms    P Axis 32 degrees    R Axis 31 degrees    T Axis -76 degrees    Diagnosis       Normal sinus rhythm  Cannot rule out Inferior infarct , age undetermined  Cannot rule out Anterior infarct , age undetermined  Abnormal ECG  When compared with ECG of 06-APR-2021 16:24,  Minimal criteria for Inferior infarct are now Present  Nonspecific T wave abnormality now evident in Lateral leads  Confirmed by FREDDY BUTTERFIELD (), DEBBIE HERNANDEZ ([id number]) on 7/7/2025 6:26:51 AM     Potassium    Collection Time: 07/06/25 11:15 PM   Result Value Ref Range    Potassium 3.7 3.5 - 5.1 mmol/L   Magnesium    Collection Time: 07/06/25 11:15 PM   Result Value Ref Range    Magnesium 2.5 (H) 1.8 - 2.4 mg/dL   Basic Metabolic Panel w/ Reflex to MG    Collection Time: 07/07/25  3:36 AM   Result Value Ref Range    Sodium 140 136 - 145 mmol/L    Potassium 4.1 3.5 - 5.1 mmol/L    Chloride 106 98 - 107 mmol/L    CO2 24 20 - 29 mmol/L    Anion Gap 9 7 - 16 mmol/L    Glucose 109 (H) 70 - 99 mg/dL    BUN 8 8 - 23 MG/DL    Creatinine 0.76 (L) 0.80 - 1.30 MG/DL    Est, Glom Filt Rate >90 >60 ml/min/1.73m2    Calcium 9.0 8.8 - 10.2 MG/DL   CBC    Collection Time: 07/07/25  3:36 AM

## 2025-07-07 NOTE — PROGRESS NOTES
GOALS:  LTG: Patient will improve neuro-linguistic abilities to increase safety and awareness in functional living environment.   STG:  Patient will recall relevant verbal information with 100% accuracy with minimal cues.  Patient will utilize memory compensatory strategies to improve recall of functional information with 100% accuracy given minimal assistance.    SPEECH LANGUAGE PATHOLOGY: COGNITIVE COMMUNICATION Initial Assessment    Acknowledge Order  I  Therapy Time  I   Charges     I  Rehab Caseload Tracker    NAME: Sherman Jones  : 1965  MRN: [id number]    ADMISSION DATE: 2025  PRIMARY DIAGNOSIS: Transient global amnesia    ICD-10: Treatment Diagnosis:  R41.841 Cognitive-Communication Deficit    RECOMMENDATIONS:   Recommendations:   Use of compensatory memory strategies    Therapeutic Interventions: Patient/family education  Cognitive-linguistic treatment   Patient continues to require skilled intervention: Yes. Recommend ongoing speech therapy services during this hospitalization.   Anticipated Discharge Needs: Ongoing speech therapy is recommended at next level of care.      ASSESSMENT   Patient's wife with concern for patient's cognitive status over recent months, reports observed difficulty with memory. Presented to emergency room yesterday due to sudden onset of confusion. Patient reports he does not recall details of yesterday, acknowledges difficulty with memory at this time.     Patient is alert and oriented. Difficulty noted with recall in structured tasks. No deficits observed with simple problem solving. Patient agreeable to continued speech therapy and more in depth assessment as indicated.     Both patient and wife deny difficulty with swallowing. Patient passed RN swallow screen and was placed on regular diet. Will defer swallow assessment at this time.     GENERAL   Subjective:  BARBIE López cleared patient to participate with speech therapy. Patient upright in bed, agreeable to  evaluation. Wife at bedside.     Reason for Consult: CVA workup    History of Present Injury/Illness: Mr. Jones  has a past medical history of Alcoholism /alcohol abuse, Anxiety, Cardiomyopathy (HCC), Diverticular disease, Essential hypertension, GERD (gastroesophageal reflux disease), GI bleed, Hiatal hernia with gastroesophageal reflux, and Hypertension.. He also  has a past surgical history that includes Colonoscopy (); Colonoscopy (3/2018, ); Colonoscopy (N/A, 2020); Colonoscopy (N/A, 2020); total colectomy (2008); cyst removal (); and hernia repair.  Precautions/Allergies: Penicillins     Observations:  Alertness: Alert  Voice: WFL  Speech: Intelligible  Expressive Language: Fluent and Able to communicate wants and needs  Receptive Language: Answers yes/no questions, Follows basic commands, and Appropriately responds to questions    Prior Speech Therapy: No prior speech therapy noted in chart    Current Diet:  regular  thin liquids     Respiratory Status: Room air    Pain:  Patient does not c/o pain  Pain intervention: None- No pain observed  Pain response: Patient satisfied    OBJECTIVE   Vocal Quality:  Adequate       Speech Intelligiblity: Within functional limits: 100% intelligible and clear.     Cognitive-Communication:   Orientation: fully oriented to self, location, and temporal information. Has difficulty recalling yesterday's events.   Recall: presented with 5 random words. Immediate recall of 5/5. After a short delay able to recall 3/5.  Problem Solving: able to answer simple questions requiring math and numeric manipulation with 100% accuracy independently  Divergent Namin% accuracy with relative ease.     Discussed speech therapy and role in cognitive-communication disorders. Patient agreeable to continue with speech therapy as neurologic workup is pending.       MODIFIED MIGUEL SCALE (mRS) SCORE:   Score: 2 Description   [] 0  No Symptoms   [] 1 No significant

## 2025-07-07 NOTE — PROGRESS NOTES
Pt is in bed without complaints. Hourly rounds completed and all needs met. Pt went down for MRI today. Tums, tylenol given x1 and ativan for MRI. Still scoring 0 on NIHSS. Bed is low, locked, and call light is in reach. Pt encouraged to call for assistance.

## 2025-07-07 NOTE — CARE COORDINATION
Assessment completed with patient, spouse and children in room. Patient lives with spouse. He is IND at baseline. Works and drives. Demographics and PCP confirmed. Discharge plan is to return home. PT/OT evaluations pending. No anticipated discharge needs.     Christian YUSUF, LISAM  Cooper         07/07/25 1458   Service Assessment   Patient Orientation Alert and Oriented   Cognition Alert   History Provided By Patient   Primary Caregiver Self   Support Systems Spouse/Significant Other;Family Members;Children   Patient's Healthcare Decision Maker is: Legal Next of Kin   PCP Verified by CM Yes   Prior Functional Level Independent in ADLs/IADLs   Current Functional Level Independent in ADLs/IADLs   Can patient return to prior living arrangement Yes   Ability to make needs known: Good   Family able to assist with home care needs: Yes   Would you like for me to discuss the discharge plan with any other family members/significant others, and if so, who? Yes  (family)   Financial Resources Other (Comment)  (commerical)   Community Resources None   Social/Functional History   Lives With Spouse   Type of Home House   Ambulation Assistance Independent   Prior Level of Assist for Transfers Independent   Active  Yes   Condition of Participation: Discharge Planning   The Plan for Transition of Care is related to the following treatment goals: return   The Patient and/or Patient Representative was provided with a Choice of Provider? Patient   The Patient and/Or Patient Representative agree with the Discharge Plan? Yes   Freedom of Choice list was provided with basic dialogue that supports the patient's individualized plan of care/goals, treatment preferences, and shares the quality data associated with the providers?  Yes

## 2025-07-07 NOTE — THERAPY EVALUATION
PHYSICAL THERAPY Initial Assessment and Discharge  (Link to Caseload Tracking: PT Visit Days : 1  Acknowledge Orders  Time In/Out  PT Charge Capture  Rehab Caseload Tracker    Sherman Jones is a 60 y.o. male   PRIMARY DIAGNOSIS: Transient global amnesia  Transient global amnesia [G45.4]       Reason for Referral: Generalized Muscle Weakness (M62.81)  Observation: Payor: [employer] / Plan: [employer] OPEN ACCESS PLUS (OAP) / Product Type: *No Product type* /     ASSESSMENT:     REHAB RECOMMENDATIONS:   Recommendation to date pending progress:  Setting:  No further skilled physical therapy after discharge from hospital    Equipment:    None     ASSESSMENT:  Mr. Jones is a 60 year old male who presents sudden onset confusion while at the pool with his family. He is admitted with transient global amnesia and is currently undergoing further work-up. At baseline he lives with his wife and is independent with mobility.   Today patient reports his symptoms have improved and he feels close to his baseline cognition, but is unable to recall events that happened during his episode of confusion yesterday. He denies any changes in his vision, sensation, or strength. He performs bed mobility, transfers, standing tasks, and ambulation overall at independent level today.  Pt presents as functioning at his baseline in regards to functional mobility, no acute PT intervention is indicated at this time. Will discharge from PT caseload. Please re-consult if any new needs arise. Thank you.      [facility] AM-PAC™ “6 Clicks” Basic Mobility Inpatient Short Form  AM-PAC Basic Mobility - Inpatient   How much help is needed turning from your back to your side while in a flat bed without using bedrails?: None  How much help is needed moving from lying on your back to sitting on the side of a flat bed without using bedrails?: None  How much help is needed moving to and from a bed to a chair?: None  How much help is needed standing up from

## 2025-07-08 VITALS
SYSTOLIC BLOOD PRESSURE: 119 MMHG | TEMPERATURE: 98.2 F | OXYGEN SATURATION: 96 % | BODY MASS INDEX: 28.83 KG/M2 | HEIGHT: 69 IN | DIASTOLIC BLOOD PRESSURE: 85 MMHG | RESPIRATION RATE: 16 BRPM | HEART RATE: 66 BPM | WEIGHT: 194.67 LBS

## 2025-07-08 LAB
BACTERIA SPEC CULT: NORMAL
SERVICE CMNT-IMP: NORMAL

## 2025-07-08 PROCEDURE — 6360000002 HC RX W HCPCS: Performed by: STUDENT IN AN ORGANIZED HEALTH CARE EDUCATION/TRAINING PROGRAM

## 2025-07-08 PROCEDURE — 96372 THER/PROPH/DIAG INJ SC/IM: CPT

## 2025-07-08 PROCEDURE — G0378 HOSPITAL OBSERVATION PER HR: HCPCS

## 2025-07-08 PROCEDURE — 2500000003 HC RX 250 WO HCPCS: Performed by: STUDENT IN AN ORGANIZED HEALTH CARE EDUCATION/TRAINING PROGRAM

## 2025-07-08 RX ORDER — ATORVASTATIN CALCIUM 80 MG/1
80 TABLET, FILM COATED ORAL NIGHTLY
Qty: 30 TABLET | Refills: 0 | Status: SHIPPED
Start: 2025-07-08 | End: 2025-07-14

## 2025-07-08 RX ADMIN — SODIUM CHLORIDE, PRESERVATIVE FREE 10 ML: 5 INJECTION INTRAVENOUS at 08:00

## 2025-07-08 RX ADMIN — ENOXAPARIN SODIUM 40 MG: 100 INJECTION SUBCUTANEOUS at 08:00

## 2025-07-08 NOTE — DISCHARGE SUMMARY
Urine, clean catch Updated: 07/07/25 8997     Special Requests NO SPECIAL REQUESTS        Culture NO GROWTH 1 DAY               All Labs from Last 24 Hrs:  Recent Results (from the past 24 hours)   Echo (TTE) complete (PRN contrast/bubble/strain/3D)    Collection Time: 07/07/25  2:52 PM   Result Value Ref Range    LV EDV A2C 142 mL    LV EDV A4C 194 mL    LV ESV A2C 49 mL    LV ESV A4C 73 mL    IVSd 0.9 0.6 - 1.0 cm    LVIDd 5.4 4.2 - 5.9 cm    LVIDs 3.5 cm    LVOT Diameter 2.0 cm    LVOT Mean Gradient 2 mmHg    LVOT VTI 19.5 cm    LVOT Peak Velocity 1.1 m/s    LVOT Peak Gradient 5 mmHg    LVPWd 0.8 0.6 - 1.0 cm    LV E' Lateral Velocity 14.40 cm/s    LV E' Septal Velocity 11.10 cm/s    LV Ejection Fraction A2C 65 %    LV Ejection Fraction A4C 62 %    EF BP 64 55 - 100 %    LVOT Area 3.1 cm2    LVOT SV 61.2 ml    LA Minor Axis 6.4 cm    LA Major Axis 6.5 cm    LA Area 2C 17.7 cm2    LA Area 4C 25.0 cm2    LA Volume MOD A2C 41 18 - 58 mL    LA Volume MOD A4C 78 (A) 18 - 58 mL    LA Volume BP 56 18 - 58 mL    LA Diameter 4.8 cm    RA Area 4C 16.6 cm2    RA Volume 42 ml    AV Cusp Mmode 2.1 cm    AV Mean Velocity 0.9 m/s    AV Mean Gradient 4 mmHg    AV VTI 25.7 cm    AV Peak Velocity 1.5 m/s    AV Peak Gradient 9 mmHg    AV Peak Gradient 9 mmHg    AV Area by VTI 2.4 cm2    AV Area by Peak Velocity 2.3 cm2    Aortic Root 3.5 cm    Ascending Aorta 3.3 cm    IVC Proxmal 1.0 cm    MV Max Velocity 0.8 m/s    MV Peak Gradient 2 mmHg    MV E Wave Deceleration Time 172.0 ms    MV A Velocity 0.63 m/s    MV E Velocity 0.65 m/s    MV Mean Gradient 1 mmHg    MV VTI 17.3 cm    MV Mean Velocity 0.4 m/s    MV Area by VTI 3.5 cm2    PA End Diastolic Velocity 1.1 m/s    Pulmonary Artery EDP 5 mmHg    PV .0 ms    PV Max Velocity 0.9 m/s    PV Peak Gradient 4 mmHg    RVIDd 3.6 cm    RV Basal Dimension 4.2 cm    RV Mid Dimension 3.4 cm    RV Free Wall Peak S' 27.3 cm/s    TAPSE 3.3 >=1.7 cm    Body Surface Area 2.08 m2     Fractional Shortening 2D 35 28 - 44 %    LV ESV Index A4C 36 mL/m2    LV EDV Index A4C 95 mL/m2    LV ESV Index A2C 24 mL/m2    LV EDV Index A2C 69 mL/m2    LVIDd Index 2.63 cm/m2    LVIDs Index 1.71 cm/m2    LV RWT Ratio 0.30     LV Mass 2D 167.4 88 - 224 g    LV Mass 2D Index 81.6 49 - 115 g/m2    MV E/A 1.03     E/E' Ratio (Averaged) 5.18     E/E' Lateral 4.51     E/E' Septal 5.86     LA Volume Index BP 27 16 - 34 ml/m2    LVOT Stroke Volume Index 29.9 mL/m2    LA Volume Index MOD A2C 20 16 - 34 ml/m2    LA Volume Index MOD A4C 38 (A) 16 - 34 ml/m2    LA Size Index 2.34 cm/m2    LA/AO Root Ratio 1.37     RA Volume Index A4C 20 mL/m2    Ao Root Index 1.71 cm/m2    Ascending Aorta Index 1.61 cm/m2    AV Velocity Ratio 0.73     LVOT:AV VTI Index 0.76     LAURENT/BSA VTI 1.2 cm2/m2    LAURENT/BSA Peak Velocity 1.1 cm2/m2    MV:LVOT VTI Index 0.89     EF Physician 60 %    Est. RA Pressure 3 mmHg       No results for input(s): \"COVID19\" in the last 72 hours.    Recent Vital Data:  Patient Vitals for the past 24 hrs:   Temp Pulse Resp BP SpO2   07/08/25 0728 98.2 °F (36.8 °C) 66 16 119/85 96 %   07/08/25 0402 97.5 °F (36.4 °C) 68 15 115/79 95 %   07/07/25 1952 98.4 °F (36.9 °C) 78 14 121/81 96 %   07/07/25 1459 98.4 °F (36.9 °C) 77 16 121/81 96 %       Oxygen Therapy  SpO2: 96 %  O2 Device: None (Room air)    Estimated body mass index is 28.34 kg/m² as calculated from the following:    Height as of this encounter: 1.765 m (5' 9.49\").    Weight as of this encounter: 88.3 kg (194 lb 10.7 oz).    Intake/Output Summary (Last 24 hours) at 7/8/2025 0859  Last data filed at 7/7/2025 0913  Gross per 24 hour   Intake 240 ml   Output --   Net 240 ml         Physical Exam:  /85   Pulse 66   Temp 98.2 °F (36.8 °C) (Oral)   Resp 16   Ht 1.765 m (5' 9.49\")   Wt 88.3 kg (194 lb 10.7 oz)   SpO2 96%   BMI 28.34 kg/m²      General:          Well nourished.    Patient is lying in bed with head up.   Afebrile.   Patient is talking and

## 2025-07-08 NOTE — DISCHARGE INSTRUCTIONS
If your condition is worse after hospital discharge, contact your healthcare provider or return to hospital.  You are advised to follow-up with your primary doctor and/or specialist.  With a follow-up visit, please make sure that your provider is aware of your admission and have them review your hospital record for any follow-up plans or investigations that need to be done.  You may need prescription renewal when you complete your current prescription, at the time of follow-up.  Please consult your healthcare provider regarding that.     Other

## 2025-07-11 ENCOUNTER — TELEPHONE (OUTPATIENT)
Dept: INTERNAL MEDICINE CLINIC | Facility: CLINIC | Age: 60
End: 2025-07-11

## 2025-07-11 NOTE — TELEPHONE ENCOUNTER
Spoke with patient regarding TCM care and PCP follow-up.  He is doing much better since hospital discharge and has a cardiology follow-up appointment early next week.  He does not have a PCP hospital discharge follow-up appointment as yet and I encouraged him to do so as soon as possible.  He is concerned about taking Lipitor especially at the 80 mg dose and thinks he may be having side effects.  I recommended that he should try 40 mg instead until he sees his cardiologist next week and discuss this further with him.  He agrees and was grateful for the call.

## 2025-07-13 NOTE — PROGRESS NOTES
[facility] CARDIOLOGY  [address] [place] [address], [address]  [place] [address]  PHONE: [phone]        NAME:  Sherman Jones  : 1965  MRN: [id number]     PCP:  Turner Eden Jr., MD    SUBJECTIVE:   Sherman Jones is a 60 y.o. male seen for a consultation visit regarding the following:     Chief Complaint   Patient presents with    Cardiomyopathy        HPI:  Last seen in .  Presents to reestablish care.    He had COVID at the end of 2020, a mild case, but for several weeks afterwards noted recurrent exertional dyspnea without angina or palpitations. Volume status stable.  Echo and nuclear stress test benign with no worrisome features but CT scan  of the chest showed bilateral pulmonary emboli most likely due to prothrombotic state surrounding the inflammatory Covid response.  Started on Xarelto and has done very well.  No bleeding issues and his breathing is returned completely to normal.  In  retrospect he thinks he is breathing better now compared to several months prior to slow onset of dyspnea after his Covid infection.  He was likely throwing small emboli for several months prior to diagnosis.    Recently got overheated at the pool and had altered mental status and diagnosed with TGA.  BP marginal despite hydrating daily.  Denies any angina but has exertional dyspnea and occasional palpitations.  No griselda syncope.  Exam today is benign but BP somewhat low.  He does not appear to be clinically dehydrated.    Echocardiogram 2025:  Left Ventricle Normal left ventricular systolic function with a visually estimated EF of 60 - 65%. Left ventricle size is normal. Normal wall thickness. Normal wall motion. Normal diastolic function.   Left Atrium Left atrium is mildly dilated.   Interatrial Septum Agitated saline study was negative with and without provocation.   Right Ventricle Right ventricle size is normal. Normal systolic function.   Right Atrium Right atrium size is

## 2025-07-14 ENCOUNTER — OFFICE VISIT (OUTPATIENT)
Dept: FAMILY MEDICINE CLINIC | Facility: CLINIC | Age: 60
End: 2025-07-14
Payer: COMMERCIAL

## 2025-07-14 VITALS
BODY MASS INDEX: 26.5 KG/M2 | TEMPERATURE: 97.2 F | SYSTOLIC BLOOD PRESSURE: 112 MMHG | HEART RATE: 106 BPM | WEIGHT: 182 LBS | DIASTOLIC BLOOD PRESSURE: 76 MMHG | OXYGEN SATURATION: 96 %

## 2025-07-14 DIAGNOSIS — R41.0 CONFUSION: Primary | ICD-10-CM

## 2025-07-14 DIAGNOSIS — Z09 HOSPITAL DISCHARGE FOLLOW-UP: ICD-10-CM

## 2025-07-14 DIAGNOSIS — G45.4 TRANSIENT GLOBAL AMNESIA: ICD-10-CM

## 2025-07-14 DIAGNOSIS — G47.33 OSA (OBSTRUCTIVE SLEEP APNEA): ICD-10-CM

## 2025-07-14 LAB
BILIRUBIN, URINE, POC: NEGATIVE
BLOOD URINE, POC: NEGATIVE
GLUCOSE URINE, POC: NEGATIVE MG/DL
KETONES, URINE, POC: NORMAL MG/DL
LEUKOCYTE ESTERASE, URINE, POC: NEGATIVE
NITRITE, URINE, POC: NEGATIVE
PH, URINE, POC: 7 (ref 4.6–8)
PROTEIN,URINE, POC: NORMAL MG/DL
SPECIFIC GRAVITY, URINE, POC: 1.02 (ref 1–1.03)
URINALYSIS CLARITY, POC: CLEAR
URINALYSIS COLOR, POC: NORMAL
UROBILINOGEN, POC: NORMAL MG/DL

## 2025-07-14 PROCEDURE — 1111F DSCHRG MED/CURRENT MED MERGE: CPT | Performed by: FAMILY MEDICINE

## 2025-07-14 PROCEDURE — 3078F DIAST BP <80 MM HG: CPT | Performed by: FAMILY MEDICINE

## 2025-07-14 PROCEDURE — 3074F SYST BP LT 130 MM HG: CPT | Performed by: FAMILY MEDICINE

## 2025-07-14 PROCEDURE — 81003 URINALYSIS AUTO W/O SCOPE: CPT | Performed by: FAMILY MEDICINE

## 2025-07-14 NOTE — PROGRESS NOTES
Sherman Jones (:  1965) is a 60 y.o. male,Established patient, here for evaluation of the following chief complaint(s):  Follow-Up from Hospital (TGA -  - 25 Pt still feeling jittery, nauseous, and fatigued)         Assessment & Plan  Confusion   Diagnosed transient global ischemia.  Will consider TIA.  Unable to take aspirin because of GI bleeds.  Probably consider Plavix. Still with confusion. Patient will try to get FMLA papers and send to PCP.    Orders:    AMB POC URINALYSIS DIP STICK AUTO W/O MICRO    AISHA (obstructive sleep apnea)   Will do home sleep study.  Discussed importance of treating sleep apnea. Discussed using inspire if positive sleep study and does not desire CPAP machine.  Will do home sleep study.       Hospital discharge follow-up       Orders:    [place] DISCHARGE MEDS RECONCILED W/ CURRENT OUTPATIENT MED LIST    Transient global amnesia              Return in 2 weeks (on 2025) for ffup with pcp.       Subjective   60-year-old male comes in for follow-up from the hospital.  Patient came in with transient global amnesia.  MRI, CTA, CT scan of head did not show any infarction. Complains of weakness.  Snores at night.  Sleep 8 hours.  + smelly urine. No dysuria. Has known sleep apnea. Not using cpap machine.  Uncomfortable with using a mask.  Patient does not desire to use his CPAP machine.  Patient has a history of bilateral pulmonary embolism.  At that time had a history of nocturnal hypoxemia which ultimately was resolved.  Patient takes his sertraline as needed for anxiety.  Told him it was designed as a maintenance drug.  Lab work was done in the hospital CBC was normal, BMP slightly elevated sugar.  Hemoglobin A1c 5.9.  .        Review of Systems       Objective   Physical Exam  Constitutional:       Appearance: Normal appearance.      Comments: Has slightly poor memory   Cardiovascular:      Rate and Rhythm: Regular rhythm. Tachycardia present.      Heart

## 2025-07-14 NOTE — ASSESSMENT & PLAN NOTE
Will do home sleep study.  Discussed importance of treating sleep apnea. Discussed using inspire if positive sleep study and does not desire CPAP machine.  Will do home sleep study.

## 2025-07-15 ENCOUNTER — OFFICE VISIT (OUTPATIENT)
Age: 60
End: 2025-07-15
Payer: COMMERCIAL

## 2025-07-15 ENCOUNTER — TELEPHONE (OUTPATIENT)
Age: 60
End: 2025-07-15

## 2025-07-15 VITALS
BODY MASS INDEX: 27.25 KG/M2 | WEIGHT: 184 LBS | HEART RATE: 86 BPM | HEIGHT: 69 IN | SYSTOLIC BLOOD PRESSURE: 98 MMHG | DIASTOLIC BLOOD PRESSURE: 88 MMHG

## 2025-07-15 DIAGNOSIS — I49.3 PVCS (PREMATURE VENTRICULAR CONTRACTIONS): ICD-10-CM

## 2025-07-15 DIAGNOSIS — I82.542 CHRONIC DEEP VEIN THROMBOSIS (DVT) OF TIBIAL VEIN OF LEFT LOWER EXTREMITY (HCC): ICD-10-CM

## 2025-07-15 DIAGNOSIS — R00.2 PALPITATIONS: ICD-10-CM

## 2025-07-15 DIAGNOSIS — G47.33 OSA (OBSTRUCTIVE SLEEP APNEA): ICD-10-CM

## 2025-07-15 DIAGNOSIS — I42.0 DILATED CARDIOMYOPATHY (HCC): ICD-10-CM

## 2025-07-15 DIAGNOSIS — R06.09 DOE (DYSPNEA ON EXERTION): Primary | ICD-10-CM

## 2025-07-15 DIAGNOSIS — K21.9 GASTROESOPHAGEAL REFLUX DISEASE WITHOUT ESOPHAGITIS: ICD-10-CM

## 2025-07-15 DIAGNOSIS — I26.99 PULMONARY EMBOLISM, BILATERAL (HCC): ICD-10-CM

## 2025-07-15 DIAGNOSIS — I10 ESSENTIAL HYPERTENSION WITH GOAL BLOOD PRESSURE LESS THAN 130/85: ICD-10-CM

## 2025-07-15 PROCEDURE — 3074F SYST BP LT 130 MM HG: CPT | Performed by: INTERNAL MEDICINE

## 2025-07-15 PROCEDURE — 3079F DIAST BP 80-89 MM HG: CPT | Performed by: INTERNAL MEDICINE

## 2025-07-15 PROCEDURE — 99205 OFFICE O/P NEW HI 60 MIN: CPT | Performed by: INTERNAL MEDICINE

## 2025-07-15 PROCEDURE — 93000 ELECTROCARDIOGRAM COMPLETE: CPT | Performed by: INTERNAL MEDICINE

## 2025-07-15 ASSESSMENT — ENCOUNTER SYMPTOMS: SHORTNESS OF BREATH: 1

## 2025-07-16 ENCOUNTER — PATIENT MESSAGE (OUTPATIENT)
Dept: FAMILY MEDICINE CLINIC | Facility: CLINIC | Age: 60
End: 2025-07-16

## 2025-07-16 DIAGNOSIS — J32.9 RECURRENT SINUS INFECTIONS: ICD-10-CM

## 2025-07-16 DIAGNOSIS — H61.23 BILATERAL IMPACTED CERUMEN: Primary | ICD-10-CM

## 2025-07-21 ENCOUNTER — OFFICE VISIT (OUTPATIENT)
Dept: FAMILY MEDICINE CLINIC | Facility: CLINIC | Age: 60
End: 2025-07-21
Payer: COMMERCIAL

## 2025-07-21 VITALS
SYSTOLIC BLOOD PRESSURE: 120 MMHG | HEART RATE: 102 BPM | BODY MASS INDEX: 26.81 KG/M2 | DIASTOLIC BLOOD PRESSURE: 99 MMHG | TEMPERATURE: 98.2 F | OXYGEN SATURATION: 99 % | HEIGHT: 69 IN | WEIGHT: 181 LBS

## 2025-07-21 DIAGNOSIS — F41.9 ANXIETY AND DEPRESSION: ICD-10-CM

## 2025-07-21 DIAGNOSIS — F32.A ANXIETY AND DEPRESSION: ICD-10-CM

## 2025-07-21 DIAGNOSIS — R51.9 ACUTE INTRACTABLE HEADACHE, UNSPECIFIED HEADACHE TYPE: Primary | ICD-10-CM

## 2025-07-21 PROCEDURE — 99214 OFFICE O/P EST MOD 30 MIN: CPT | Performed by: FAMILY MEDICINE

## 2025-07-21 PROCEDURE — 3080F DIAST BP >= 90 MM HG: CPT | Performed by: FAMILY MEDICINE

## 2025-07-21 PROCEDURE — 3074F SYST BP LT 130 MM HG: CPT | Performed by: FAMILY MEDICINE

## 2025-07-21 PROCEDURE — 96372 THER/PROPH/DIAG INJ SC/IM: CPT | Performed by: FAMILY MEDICINE

## 2025-07-21 RX ORDER — BUTALBITAL, ACETAMINOPHEN AND CAFFEINE 50; 325; 40 MG/1; MG/1; MG/1
1 TABLET ORAL EVERY 6 HOURS PRN
Qty: 20 TABLET | Refills: 1 | Status: SHIPPED | OUTPATIENT
Start: 2025-07-21

## 2025-07-21 RX ORDER — KETOROLAC TROMETHAMINE 30 MG/ML
60 INJECTION, SOLUTION INTRAMUSCULAR; INTRAVENOUS ONCE
Status: COMPLETED | OUTPATIENT
Start: 2025-07-21 | End: 2025-07-21

## 2025-07-21 RX ORDER — FLUTICASONE PROPIONATE 50 MCG
2 SPRAY, SUSPENSION (ML) NASAL DAILY
Qty: 16 G | Refills: 0 | Status: SHIPPED | OUTPATIENT
Start: 2025-07-21

## 2025-07-21 RX ADMIN — KETOROLAC TROMETHAMINE 60 MG: 30 INJECTION, SOLUTION INTRAMUSCULAR; INTRAVENOUS at 14:24

## 2025-07-21 ASSESSMENT — PATIENT HEALTH QUESTIONNAIRE - PHQ9
2. FEELING DOWN, DEPRESSED OR HOPELESS: NOT AT ALL
1. LITTLE INTEREST OR PLEASURE IN DOING THINGS: NOT AT ALL
SUM OF ALL RESPONSES TO PHQ QUESTIONS 1-9: 0

## 2025-07-21 NOTE — PROGRESS NOTES
Sherman Jones (:  1965) is a 60 y.o. male,Established patient, here for evaluation of the following chief complaint(s):  Migraine (FOR OVER 1 WEEK SINCE COMING OUT THE HOSPITAL , COMING FROM BACK OF HEAD )         Assessment & Plan  Anxiety and depression   Zoloft refilled.    Orders:    sertraline (ZOLOFT) 50 MG tablet; Take 1 tablet by mouth daily    Acute intractable headache, unspecified headache type   This is new . Pain mostly in occipital area. Complains of pain in the back of eyes possible ethmoid sinus.  Just had ct but ha is new so will do repeat since worse headache he has had. Will treat with toradol and place on fioricet. Has appt with neurology and ent next week. FMLA is good till Monday. HA not c/w with migraine. Pain 8/10 in clinic. Will treat with shot of toradol. Go to er if gets worse.     Orders:    CT HEAD WO CONTRAST; Future    ketorolac (TORADOL) injection 60 mg    butalbital-acetaminophen-caffeine (FIORICET, ESGIC) -40 MG per tablet; Take 1 tablet by mouth every 6 hours as needed for Headaches    CT SINUS WO CONTRAST; Future    fluticasone (FLONASE) 50 MCG/ACT nasal spray; 2 sprays by Each Nostril route daily      Return in about 1 week (around 2025) for ffup with pcp.       Subjective   61 y/o male in because of HA. Was in hospital for transient global amnesia. Ha in back of head. Radiates to eyes. Pain at 8/10. Worst HA ever. Not pounding like a pressure headache. Feels lie he has a post nasal drip. Tried tylenol and ibuprofen. Laying down is better. No relief with sleep. No light or sound sensitivity. No driving. No caffeine.  No blurring vision. No nausea or vomiting. Gets disoriented with severe ha. Has tried ice and heat with no relief. Has appointment with neurology and ENT. Patient does complain of some congestion in the back of his nose with pain in front of his eyes. Will do ct of head and sinuses.        Review of Systems       Objective   Physical

## 2025-07-25 ENCOUNTER — HOSPITAL ENCOUNTER (OUTPATIENT)
Dept: CT IMAGING | Age: 60
Discharge: HOME | End: 2025-07-27
Payer: COMMERCIAL

## 2025-07-25 ENCOUNTER — TELEPHONE (OUTPATIENT)
Dept: FAMILY MEDICINE CLINIC | Facility: CLINIC | Age: 60
End: 2025-07-25

## 2025-07-25 DIAGNOSIS — M54.2 NECK PAIN: Primary | ICD-10-CM

## 2025-07-25 DIAGNOSIS — M54.2 NECK PAIN: ICD-10-CM

## 2025-07-25 DIAGNOSIS — R51.9 ACUTE INTRACTABLE HEADACHE, UNSPECIFIED HEADACHE TYPE: ICD-10-CM

## 2025-07-25 PROCEDURE — 70486 CT MAXILLOFACIAL W/O DYE: CPT

## 2025-07-25 PROCEDURE — 72125 CT NECK SPINE W/O DYE: CPT

## 2025-07-25 PROCEDURE — 70450 CT HEAD/BRAIN W/O DYE: CPT

## 2025-07-25 NOTE — TELEPHONE ENCOUNTER
Pt's wife called and said pt is going in for a CT scan today and she wants Dr vera to change the order to also scan pt's neck as well.

## 2025-07-28 ENCOUNTER — OFFICE VISIT (OUTPATIENT)
Dept: FAMILY MEDICINE CLINIC | Facility: CLINIC | Age: 60
End: 2025-07-28
Payer: COMMERCIAL

## 2025-07-28 ENCOUNTER — TELEPHONE (OUTPATIENT)
Dept: FAMILY MEDICINE CLINIC | Facility: CLINIC | Age: 60
End: 2025-07-28

## 2025-07-28 VITALS
TEMPERATURE: 97.3 F | SYSTOLIC BLOOD PRESSURE: 130 MMHG | OXYGEN SATURATION: 96 % | HEART RATE: 110 BPM | DIASTOLIC BLOOD PRESSURE: 82 MMHG | BODY MASS INDEX: 27.62 KG/M2 | WEIGHT: 187 LBS

## 2025-07-28 DIAGNOSIS — F32.A MILD DEPRESSION: ICD-10-CM

## 2025-07-28 DIAGNOSIS — G44.209 TENSION HEADACHE: ICD-10-CM

## 2025-07-28 DIAGNOSIS — F90.0 ATTENTION DEFICIT HYPERACTIVITY DISORDER, INATTENTIVE TYPE: Primary | ICD-10-CM

## 2025-07-28 DIAGNOSIS — G45.4 TRANSIENT GLOBAL AMNESIA: ICD-10-CM

## 2025-07-28 PROCEDURE — 99213 OFFICE O/P EST LOW 20 MIN: CPT | Performed by: FAMILY MEDICINE

## 2025-07-28 PROCEDURE — 3079F DIAST BP 80-89 MM HG: CPT | Performed by: FAMILY MEDICINE

## 2025-07-28 PROCEDURE — 3075F SYST BP GE 130 - 139MM HG: CPT | Performed by: FAMILY MEDICINE

## 2025-07-28 RX ORDER — DEXTROAMPHETAMINE SACCHARATE, AMPHETAMINE ASPARTATE, DEXTROAMPHETAMINE SULFATE AND AMPHETAMINE SULFATE 2.5; 2.5; 2.5; 2.5 MG/1; MG/1; MG/1; MG/1
TABLET ORAL
Qty: 60 TABLET | Refills: 0 | Status: SHIPPED | OUTPATIENT
Start: 2025-07-28 | End: 2025-08-28

## 2025-07-28 ASSESSMENT — ENCOUNTER SYMPTOMS
EYES NEGATIVE: 1
GASTROINTESTINAL NEGATIVE: 1
RESPIRATORY NEGATIVE: 1

## 2025-07-28 NOTE — PROGRESS NOTES
HISTORY OF PRESENT ILLNESS    Sherman Jones is a 60 y.o. male.  HPI  Chief Complaint   Patient presents with    Follow-up     Still having pressure headaches and not able to focus or think straight     See above. On 6 July patient was admitted with abrupt onset of confusion and disorientation. States it last for 6-8 hours. Mental symptoms have resolved and not returned. Patient was discharged the following day in stable condition. Work up included normal labs. CT, CTA and MRI of brain were normal except for low lying cerebellar tonsils.  Since discharge has had a daily headache. Location is at base of neck radiating in to the frontal area with severe pressure behind the eyes. [place] not radiate in to arms. No dizziness or vision change. Symptoms decrease when lying down but recur immediately with standing and walking. No focal neurologic deficit.  Associated with the pain is difficulty concentrating and focusing. Normally patient is very detailed in organizing tasks and completing them. Since onset of current illness patient has been \"scatter-brained\". Has difficulty focusing and is easily distracted which results in inability to function at work. Symptoms cause difficulty maintaining appropriate concentration which precludes appropriate interaction with co-workers and with the public. Frequent forgetfulness further compromises his ability to function appropriately at work.  States that Fioricet and gabapentin give some temporary relief.  Has consult scheduled with neurologist.  States that depression symptoms are in remission but current symptoms have caused some increase in anxiety.        Current Outpatient Medications on File Prior to Visit   Medication Sig Dispense Refill    sertraline (ZOLOFT) 50 MG tablet Take 1 tablet by mouth daily 90 tablet 1    butalbital-acetaminophen-caffeine (FIORICET, ESGIC) -40 MG per tablet Take 1 tablet by mouth every 6 hours as needed for Headaches 20 tablet 1

## 2025-07-28 NOTE — TELEPHONE ENCOUNTER
Who's Calling: Angelita   From Where: [facility]    Message: two questions based on 7/21 office notes: specific activity restrictions placed after visit? What was observed or assessed upon exam beyond reports of pain and symptoms to support the restrictions placed? Please call Angelita from [facility].    Phone #: [phone] ext [phone]  Fax #:

## 2025-07-29 ENCOUNTER — OFFICE VISIT (OUTPATIENT)
Dept: NEUROLOGY | Age: 60
End: 2025-07-29
Payer: COMMERCIAL

## 2025-07-29 ENCOUNTER — PATIENT MESSAGE (OUTPATIENT)
Dept: FAMILY MEDICINE CLINIC | Facility: CLINIC | Age: 60
End: 2025-07-29

## 2025-07-29 VITALS
BODY MASS INDEX: 27.7 KG/M2 | HEART RATE: 84 BPM | OXYGEN SATURATION: 100 % | HEIGHT: 69 IN | SYSTOLIC BLOOD PRESSURE: 127 MMHG | WEIGHT: 187 LBS | DIASTOLIC BLOOD PRESSURE: 88 MMHG

## 2025-07-29 DIAGNOSIS — R46.89 COGNITIVE AND BEHAVIORAL CHANGES: ICD-10-CM

## 2025-07-29 DIAGNOSIS — G47.33 OSA (OBSTRUCTIVE SLEEP APNEA): ICD-10-CM

## 2025-07-29 DIAGNOSIS — Q07.00 ARNOLD-CHIARI MALFORMATION (HCC): ICD-10-CM

## 2025-07-29 DIAGNOSIS — Z09 HOSPITAL DISCHARGE FOLLOW-UP: Primary | ICD-10-CM

## 2025-07-29 DIAGNOSIS — F41.9 ANXIETY AND DEPRESSION: ICD-10-CM

## 2025-07-29 DIAGNOSIS — F32.A ANXIETY AND DEPRESSION: ICD-10-CM

## 2025-07-29 DIAGNOSIS — R51.9 CHRONIC DAILY HEADACHE: ICD-10-CM

## 2025-07-29 DIAGNOSIS — G45.4 TGA (TRANSIENT GLOBAL AMNESIA): ICD-10-CM

## 2025-07-29 DIAGNOSIS — R41.89 COGNITIVE AND BEHAVIORAL CHANGES: ICD-10-CM

## 2025-07-29 PROBLEM — F10.20 ALCOHOLISM (HCC): Status: ACTIVE | Noted: 2025-07-29

## 2025-07-29 PROBLEM — K57.80 DIVERTICULITIS OF INTESTINE WITH PERFORATION: Status: RESOLVED | Noted: 2022-06-03 | Resolved: 2025-07-29

## 2025-07-29 PROBLEM — I26.99 PULMONARY EMBOLISM, BILATERAL (HCC): Status: RESOLVED | Noted: 2021-04-06 | Resolved: 2025-07-29

## 2025-07-29 PROBLEM — R29.818 SUSPECTED SLEEP APNEA: Status: RESOLVED | Noted: 2021-04-07 | Resolved: 2025-07-29

## 2025-07-29 PROBLEM — K57.20 DIVERTICULITIS OF COLON WITH PERFORATION: Status: RESOLVED | Noted: 2022-06-03 | Resolved: 2025-07-29

## 2025-07-29 PROBLEM — F10.20 ALCOHOLISM (HCC): Status: RESOLVED | Noted: 2025-07-29 | Resolved: 2025-07-29

## 2025-07-29 LAB — VIT B12 SERPL-MCNC: 527 PG/ML (ref 193–986)

## 2025-07-29 PROCEDURE — 99204 OFFICE O/P NEW MOD 45 MIN: CPT | Performed by: NURSE PRACTITIONER

## 2025-07-29 PROCEDURE — 3079F DIAST BP 80-89 MM HG: CPT | Performed by: NURSE PRACTITIONER

## 2025-07-29 PROCEDURE — 1111F DSCHRG MED/CURRENT MED MERGE: CPT | Performed by: NURSE PRACTITIONER

## 2025-07-29 PROCEDURE — 3074F SYST BP LT 130 MM HG: CPT | Performed by: NURSE PRACTITIONER

## 2025-07-29 ASSESSMENT — ENCOUNTER SYMPTOMS
ALLERGIC/IMMUNOLOGIC NEGATIVE: 1
APNEA: 1
EYES NEGATIVE: 1
GASTROINTESTINAL NEGATIVE: 1

## 2025-07-29 NOTE — PROGRESS NOTES
[facility] Neurology [address], [address]  [place] [address]  [phone]      Chief Complaint   Patient presents with    Follow-Up from Hospital     History of Present Illness  Sherman Jones is a 60 y.o. male who presents for hospital follow up for TGA.     He experienced a transient global amnesia (TGA) episode, exhibiting unusual behavior such as forgetting recent events and appearing disoriented. Taken to the ER suspecting a stroke. Since the TGA episode, he has struggled with severe pressure headaches relieved only by lying down, accompanied by pressure in the back of his head and eyes, intensifying with multitasking. Tylenol and ibuprofen alleviate pain but not pressure. Fioricet helps with pain, and gabapentin 100 mg at night provides some relief. Occasional nausea reported, no light sensitivity, noise sensation, or positional worsening. Symptoms improve when lying down. No tinnitus or muffled sounds.    His wife observed changes in behavior since hospital discharge, including difficulty completing tasks and changes in text messaging style. Significant stress due to work and family issues. Flonase improved sense of smell and taste. Adderall 10 mg twice daily helped with focus and attention. History of anxiety well-controlled with Zoloft. Using CPAP machine for the past month following a sleep study, improving energy levels. History of degenerative disc disease in neck and Chiari malformation, possibly contributing to headaches.    SOCIAL HISTORY:    Occupations: Works in an industrial plant    Alcohol: No alcohol consumption    Tobacco: No tobacco consumption    Coffee/Tea/Caffeine-containing Drinks: Consumed coffee recently after a 3-4 week hiatus    Sleep: Uses CPAP machine for sleep apnea    FAMILY HISTORY  -Mother: dementia  - Father: Glioblastoma  - Son: Terminal brain cancer  -Maternal grandmother: dementia                 Past Medical History:   Diagnosis Date

## 2025-07-29 NOTE — PATIENT INSTRUCTIONS
Headache Education:   Instructed the patient on over-the-counter headache management medications including: CoQ10, magnesium oxide, and butterbur.  To avoid a pain medication overuse headache trying not to take pain medicines more than 3 doses a week.   Avoid use of Fioricet or opioids to treat headaches as this can increase risk for rebound headaches.   To help relieve headache symptoms without taking pain medicine lie down under darkroom and drink glass of water.  Consider lifestyle modification including good sleep hygiene, routine medial schedules, regular exercise and managing triggers.  Keep a headache diary  to reveal triggers and possible patterns.  Triggers may be: Food, stress, perfumes, alcohol, or even chocolate.  Drink plenty of water and try to get 8 hours of sleep each night to reduce risk factors that may cause headaches.          Samples of Nurtec ODT 75 mg and Ubrelvy 100 mg provided to patient. Advised not to take medication on same day and to update office on efficacy.   Instructions:  Nurtec ODT 75 mg daily as needed for migraine abortive therapy. Not to exceed 75mg/24 hours.   Ubrelvy 100 mg daily as needed for migraine abortive therapy. May repeat for 1 dose in 2 hours if needed. Not to exceed 200mg/24 hours.

## 2025-08-07 ENCOUNTER — OFFICE VISIT (OUTPATIENT)
Age: 60
End: 2025-08-07
Payer: COMMERCIAL

## 2025-08-07 VITALS
HEIGHT: 69 IN | SYSTOLIC BLOOD PRESSURE: 132 MMHG | WEIGHT: 184 LBS | HEART RATE: 75 BPM | BODY MASS INDEX: 27.25 KG/M2 | DIASTOLIC BLOOD PRESSURE: 80 MMHG

## 2025-08-07 DIAGNOSIS — R06.09 DOE (DYSPNEA ON EXERTION): ICD-10-CM

## 2025-08-07 DIAGNOSIS — R00.2 PALPITATIONS: ICD-10-CM

## 2025-08-07 DIAGNOSIS — I49.3 PVCS (PREMATURE VENTRICULAR CONTRACTIONS): ICD-10-CM

## 2025-08-07 DIAGNOSIS — I42.0 DILATED CARDIOMYOPATHY (HCC): Primary | ICD-10-CM

## 2025-08-07 PROCEDURE — 99214 OFFICE O/P EST MOD 30 MIN: CPT | Performed by: INTERNAL MEDICINE

## 2025-08-07 PROCEDURE — 93000 ELECTROCARDIOGRAM COMPLETE: CPT | Performed by: INTERNAL MEDICINE

## 2025-08-07 PROCEDURE — 3079F DIAST BP 80-89 MM HG: CPT | Performed by: INTERNAL MEDICINE

## 2025-08-07 PROCEDURE — 3075F SYST BP GE 130 - 139MM HG: CPT | Performed by: INTERNAL MEDICINE

## 2025-08-07 RX ORDER — METOPROLOL SUCCINATE 25 MG/1
25 TABLET, EXTENDED RELEASE ORAL
Qty: 30 TABLET | Refills: 11 | Status: SHIPPED | OUTPATIENT
Start: 2025-08-07

## 2025-08-07 ASSESSMENT — ENCOUNTER SYMPTOMS
ABDOMINAL PAIN: 0
COUGH: 0
ABDOMINAL DISTENTION: 0
WHEEZING: 0
NAUSEA: 0
CONSTIPATION: 0
DIARRHEA: 0
PHOTOPHOBIA: 0
SHORTNESS OF BREATH: 0
VOMITING: 0

## 2025-08-25 ENCOUNTER — OFFICE VISIT (OUTPATIENT)
Dept: FAMILY MEDICINE CLINIC | Facility: CLINIC | Age: 60
End: 2025-08-25
Payer: COMMERCIAL

## 2025-08-25 VITALS
WEIGHT: 189 LBS | DIASTOLIC BLOOD PRESSURE: 78 MMHG | BODY MASS INDEX: 27.91 KG/M2 | HEART RATE: 84 BPM | OXYGEN SATURATION: 95 % | SYSTOLIC BLOOD PRESSURE: 112 MMHG | TEMPERATURE: 97.1 F

## 2025-08-25 DIAGNOSIS — F90.0 ATTENTION DEFICIT HYPERACTIVITY DISORDER, INATTENTIVE TYPE: ICD-10-CM

## 2025-08-25 DIAGNOSIS — G44.209 MUSCLE CONTRACTION HEADACHE: Primary | ICD-10-CM

## 2025-08-25 PROCEDURE — 99213 OFFICE O/P EST LOW 20 MIN: CPT | Performed by: FAMILY MEDICINE

## 2025-08-25 PROCEDURE — 3078F DIAST BP <80 MM HG: CPT | Performed by: FAMILY MEDICINE

## 2025-08-25 PROCEDURE — 3074F SYST BP LT 130 MM HG: CPT | Performed by: FAMILY MEDICINE

## 2025-08-25 RX ORDER — DEXTROAMPHETAMINE SACCHARATE, AMPHETAMINE ASPARTATE, DEXTROAMPHETAMINE SULFATE AND AMPHETAMINE SULFATE 2.5; 2.5; 2.5; 2.5 MG/1; MG/1; MG/1; MG/1
TABLET ORAL
Qty: 60 TABLET | Refills: 0 | Status: SHIPPED | OUTPATIENT
Start: 2025-09-24 | End: 2025-10-24

## 2025-08-25 RX ORDER — DEXTROAMPHETAMINE SACCHARATE, AMPHETAMINE ASPARTATE, DEXTROAMPHETAMINE SULFATE AND AMPHETAMINE SULFATE 2.5; 2.5; 2.5; 2.5 MG/1; MG/1; MG/1; MG/1
TABLET ORAL
Qty: 60 TABLET | Refills: 0 | Status: SHIPPED | OUTPATIENT
Start: 2025-10-24 | End: 2025-11-23

## 2025-08-25 RX ORDER — DEXTROAMPHETAMINE SACCHARATE, AMPHETAMINE ASPARTATE, DEXTROAMPHETAMINE SULFATE AND AMPHETAMINE SULFATE 2.5; 2.5; 2.5; 2.5 MG/1; MG/1; MG/1; MG/1
TABLET ORAL
Qty: 60 TABLET | Refills: 0 | Status: SHIPPED | OUTPATIENT
Start: 2025-08-25 | End: 2025-09-25

## 2025-08-25 ASSESSMENT — PATIENT HEALTH QUESTIONNAIRE - PHQ9
SUM OF ALL RESPONSES TO PHQ QUESTIONS 1-9: 0
9. THOUGHTS THAT YOU WOULD BE BETTER OFF DEAD, OR OF HURTING YOURSELF: NOT AT ALL
SUM OF ALL RESPONSES TO PHQ QUESTIONS 1-9: 0
4. FEELING TIRED OR HAVING LITTLE ENERGY: NOT AT ALL
8. MOVING OR SPEAKING SO SLOWLY THAT OTHER PEOPLE COULD HAVE NOTICED. OR THE OPPOSITE, BEING SO FIGETY OR RESTLESS THAT YOU HAVE BEEN MOVING AROUND A LOT MORE THAN USUAL: NOT AT ALL
6. FEELING BAD ABOUT YOURSELF - OR THAT YOU ARE A FAILURE OR HAVE LET YOURSELF OR YOUR FAMILY DOWN: NOT AT ALL
2. FEELING DOWN, DEPRESSED OR HOPELESS: NOT AT ALL
7. TROUBLE CONCENTRATING ON THINGS, SUCH AS READING THE NEWSPAPER OR WATCHING TELEVISION: NOT AT ALL
1. LITTLE INTEREST OR PLEASURE IN DOING THINGS: NOT AT ALL
SUM OF ALL RESPONSES TO PHQ QUESTIONS 1-9: 0
5. POOR APPETITE OR OVEREATING: NOT AT ALL
SUM OF ALL RESPONSES TO PHQ QUESTIONS 1-9: 0
3. TROUBLE FALLING OR STAYING ASLEEP: NOT AT ALL
10. IF YOU CHECKED OFF ANY PROBLEMS, HOW DIFFICULT HAVE THESE PROBLEMS MADE IT FOR YOU TO DO YOUR WORK, TAKE CARE OF THINGS AT HOME, OR GET ALONG WITH OTHER PEOPLE: NOT DIFFICULT AT ALL

## 2025-08-25 ASSESSMENT — ENCOUNTER SYMPTOMS
EYES NEGATIVE: 1
ALLERGIC/IMMUNOLOGIC NEGATIVE: 1
RESPIRATORY NEGATIVE: 1
GASTROINTESTINAL NEGATIVE: 1

## 2025-08-28 ENCOUNTER — OFFICE VISIT (OUTPATIENT)
Dept: ENT CLINIC | Age: 60
End: 2025-08-28
Payer: COMMERCIAL

## 2025-08-28 VITALS
RESPIRATION RATE: 18 BRPM | BODY MASS INDEX: 28.58 KG/M2 | HEIGHT: 69 IN | OXYGEN SATURATION: 96 % | HEART RATE: 69 BPM | WEIGHT: 193 LBS

## 2025-08-28 DIAGNOSIS — R51.9 HEADACHE DISORDER: ICD-10-CM

## 2025-08-28 DIAGNOSIS — K13.79 UVULAR EDEMA: ICD-10-CM

## 2025-08-28 DIAGNOSIS — J34.2 DEVIATED NASAL SEPTUM: ICD-10-CM

## 2025-08-28 DIAGNOSIS — H61.21 IMPACTED CERUMEN OF RIGHT EAR: ICD-10-CM

## 2025-08-28 DIAGNOSIS — R09.82 PND (POST-NASAL DRIP): Primary | ICD-10-CM

## 2025-08-28 PROCEDURE — 99204 OFFICE O/P NEW MOD 45 MIN: CPT | Performed by: STUDENT IN AN ORGANIZED HEALTH CARE EDUCATION/TRAINING PROGRAM

## 2025-08-28 PROCEDURE — 69210 REMOVE IMPACTED EAR WAX UNI: CPT | Performed by: STUDENT IN AN ORGANIZED HEALTH CARE EDUCATION/TRAINING PROGRAM

## 2025-08-28 PROCEDURE — 31231 NASAL ENDOSCOPY DX: CPT | Performed by: STUDENT IN AN ORGANIZED HEALTH CARE EDUCATION/TRAINING PROGRAM

## 2025-08-28 ASSESSMENT — ENCOUNTER SYMPTOMS
EYE DISCHARGE: 0
SHORTNESS OF BREATH: 0
CONSTIPATION: 0
NAUSEA: 0
STRIDOR: 0
DIARRHEA: 0
COUGH: 0
CHOKING: 0
SINUS PAIN: 0
FACIAL SWELLING: 0
WHEEZING: 0
APNEA: 0
EYE ITCHING: 0
EYE PAIN: 0
SINUS PRESSURE: 0

## 2025-09-02 ENCOUNTER — PATIENT MESSAGE (OUTPATIENT)
Dept: FAMILY MEDICINE CLINIC | Facility: CLINIC | Age: 60
End: 2025-09-02

## 2025-09-02 RX ORDER — BUTALBITAL, ACETAMINOPHEN AND CAFFEINE 50; 325; 40 MG/1; MG/1; MG/1
1 TABLET ORAL EVERY 6 HOURS PRN
Qty: 90 TABLET | Refills: 1 | Status: SHIPPED | OUTPATIENT
Start: 2025-09-02

## (undated) DEVICE — CONNECTOR TBNG OD5-7MM O2 END DISP

## (undated) DEVICE — NDL PRT INJ NSAF BLNT 18GX1.5 --

## (undated) DEVICE — BLOCK BITE AD 60FR W/ VELC STRP ADDRESSES MOST PT AND

## (undated) DEVICE — KENDALL RADIOLUCENT FOAM MONITORING ELECTRODE RECTANGULAR SHAPE: Brand: KENDALL

## (undated) DEVICE — SYR 5ML 1/5 GRAD LL NSAF LF --

## (undated) DEVICE — CANNULA NSL ORAL AD FOR CAPNOFLEX CO2 O2 AIRLFE

## (undated) DEVICE — SYR 3ML LL TIP 1/10ML GRAD --